# Patient Record
Sex: FEMALE | Race: WHITE | NOT HISPANIC OR LATINO | Employment: OTHER | URBAN - METROPOLITAN AREA
[De-identification: names, ages, dates, MRNs, and addresses within clinical notes are randomized per-mention and may not be internally consistent; named-entity substitution may affect disease eponyms.]

---

## 2017-02-02 ENCOUNTER — DOCUMENTATION ONLY (OUTPATIENT)
Dept: FAMILY MEDICINE | Facility: CLINIC | Age: 82
End: 2017-02-02

## 2017-02-02 ENCOUNTER — OFFICE VISIT (OUTPATIENT)
Dept: FAMILY MEDICINE | Facility: CLINIC | Age: 82
End: 2017-02-02
Payer: MEDICARE

## 2017-02-02 VITALS
HEART RATE: 68 BPM | SYSTOLIC BLOOD PRESSURE: 122 MMHG | TEMPERATURE: 98 F | RESPIRATION RATE: 16 BRPM | WEIGHT: 140.19 LBS | DIASTOLIC BLOOD PRESSURE: 68 MMHG | OXYGEN SATURATION: 93 % | BODY MASS INDEX: 27.52 KG/M2 | HEIGHT: 60 IN

## 2017-02-02 DIAGNOSIS — Z78.0 POST-MENOPAUSAL: ICD-10-CM

## 2017-02-02 DIAGNOSIS — Z23 FLU VACCINE NEED: Primary | ICD-10-CM

## 2017-02-02 DIAGNOSIS — M25.512 CHRONIC PAIN OF BOTH SHOULDERS: ICD-10-CM

## 2017-02-02 DIAGNOSIS — I10 ESSENTIAL HYPERTENSION: ICD-10-CM

## 2017-02-02 DIAGNOSIS — G89.29 CHRONIC PAIN OF BOTH SHOULDERS: ICD-10-CM

## 2017-02-02 DIAGNOSIS — M54.2 CHRONIC NECK PAIN: ICD-10-CM

## 2017-02-02 DIAGNOSIS — R60.0 BILATERAL EDEMA OF LOWER EXTREMITY: ICD-10-CM

## 2017-02-02 DIAGNOSIS — G89.29 CHRONIC NECK PAIN: ICD-10-CM

## 2017-02-02 DIAGNOSIS — M25.511 CHRONIC PAIN OF BOTH SHOULDERS: ICD-10-CM

## 2017-02-02 PROCEDURE — 1126F AMNT PAIN NOTED NONE PRSNT: CPT | Mod: S$GLB,,, | Performed by: NURSE PRACTITIONER

## 2017-02-02 PROCEDURE — 3078F DIAST BP <80 MM HG: CPT | Mod: S$GLB,,, | Performed by: NURSE PRACTITIONER

## 2017-02-02 PROCEDURE — 90662 IIV NO PRSV INCREASED AG IM: CPT | Mod: S$GLB,,, | Performed by: NURSE PRACTITIONER

## 2017-02-02 PROCEDURE — 1160F RVW MEDS BY RX/DR IN RCRD: CPT | Mod: S$GLB,,, | Performed by: NURSE PRACTITIONER

## 2017-02-02 PROCEDURE — 1159F MED LIST DOCD IN RCRD: CPT | Mod: S$GLB,,, | Performed by: NURSE PRACTITIONER

## 2017-02-02 PROCEDURE — 3074F SYST BP LT 130 MM HG: CPT | Mod: S$GLB,,, | Performed by: NURSE PRACTITIONER

## 2017-02-02 PROCEDURE — 1157F ADVNC CARE PLAN IN RCRD: CPT | Mod: S$GLB,,, | Performed by: NURSE PRACTITIONER

## 2017-02-02 PROCEDURE — 99213 OFFICE O/P EST LOW 20 MIN: CPT | Mod: 25,S$GLB,, | Performed by: NURSE PRACTITIONER

## 2017-02-02 PROCEDURE — G0008 ADMIN INFLUENZA VIRUS VAC: HCPCS | Mod: S$GLB,,, | Performed by: NURSE PRACTITIONER

## 2017-02-02 NOTE — PROGRESS NOTES
Subjective:       Patient ID: Jennifer Diaz is a 84 y.o. female.    Chief Complaint: Extremity Weakness  Has joint and back pain, shoulders and neck and now feels weak as well. Denies any fevers. Denies any falls, was ordered a bone density test, but never got it. Still has not gotten her labs done.     Has leg edema and pain in feet with curling up of her toes.   HPI  Review of Systems   Musculoskeletal: Positive for arthralgias, back pain and neck pain.   Neurological: Positive for weakness.       Objective:      Physical Exam   Constitutional: She is oriented to person, place, and time. She appears well-developed and well-nourished. No distress.   HENT:   Head: Normocephalic and atraumatic.   Eyes: Conjunctivae are normal. Right eye exhibits no discharge. Left eye exhibits no discharge. No scleral icterus.   Cardiovascular: Normal rate, regular rhythm and normal heart sounds.  Exam reveals no gallop and no friction rub.    No murmur heard.  Pulmonary/Chest: Effort normal and breath sounds normal. No respiratory distress. She has no wheezes. She has no rales.   Musculoskeletal:   Has limited ROM of extremities.    Neurological: She is alert and oriented to person, place, and time.   Skin: Skin is warm and dry. She is not diaphoretic.   Psychiatric: She has a normal mood and affect. Her behavior is normal.   Nursing note and vitals reviewed.      Assessment:     This office visit cannot be billed incident to as it does not meet the needed criteria.     1. Flu vaccine need    2. Essential hypertension    3. Post-menopausal    4. Bilateral edema of lower extremity    5. Chronic pain of both shoulders    6. Chronic neck pain        Plan:       Flu vaccine need  -     Influenza - High Dose (65+) (PF) (IM)    Essential hypertension  -     CBC auto differential; Future; Expected date: 2/2/17  -     Comprehensive metabolic panel; Future; Expected date: 2/2/17  -     Lipid panel; Future; Expected date: 2/2/17  -      TSH; Future; Expected date: 2/2/17    Post-menopausal  -     DXA Bone Density Appendicular Skeleton; Future; Expected date: 2/2/17    Bilateral edema of lower extremity  -     Brain natriuretic peptide; Future; Expected date: 2/2/17    Chronic pain of both shoulders  -     Ambulatory Referral to Physical/Occupational Therapy    Chronic neck pain  -     Ambulatory Referral to Physical/Occupational Therapy         Get labs and dexa done and follow up in 1 month

## 2017-02-02 NOTE — MR AVS SNAPSHOT
[facility]  [address] [address] [address]  Phone: [phone]  Fax: [phone]                  Jennifer Diaz   2017 3:20 PM   Office Visit    Description:  Female : 1932   Provider:  ALF Dugan   Department:  [facility]           Reason for Visit     Extremity Weakness           Diagnoses this Visit        Comments    Flu vaccine need    -  Primary     Essential hypertension         Post-menopausal         Bilateral edema of lower extremity         Chronic pain of both shoulders         Chronic neck pain                To Do List           Future Appointments        Provider Department Dept Phone    2017 9:00 AM LAB, JENNIFER SAT [facility] - Lab [phone]    2017 9:20 AM SLIC DEXA1 [facility] [phone]      Goals (5 Years of Data)     None      Follow-Up and Disposition     Return in about 1 month (around 3/2/2017).    Follow-up and Disposition History      [facility]     [facility] -  Assistance  Registered nurses in the [facility] provide clinical advisement, health education, appointment booking, and other advisory services.  Call for this free service at [phone].             Medications           Message regarding Medications     Verify the changes and/or additions to your medication regime listed below are the same as discussed with your clinician today.  If any of these changes or additions are incorrect, please notify your healthcare provider.             Verify that the below list of medications is an accurate representation of the medications you are currently taking.  If none reported, the list may be blank. If incorrect, please contact your healthcare provider. Carry this list with you in case of emergency.           Current Medications     albuterol (PROAIR HFA) 90 mcg/actuation inhaler Inhale 2 puffs into the lungs every 6  (six) hours as needed.    albuterol-ipratropium 2.5mg-0.5mg/3mL (DUO-NEB) 0.5 mg-3 mg(2.5 mg base)/3 mL nebulizer solution Take 3 mLs by nebulization every 6 (six) hours as needed for Wheezing.    budesonide-formoterol 80-4.5 mcg (SYMBICORT) 80-4.5 mcg/actuation HFAA Inhale 2 puffs into the lungs once daily.    clonazePAM (KLONOPIN) 0.5 MG tablet 0.25 mg po bid    fluticasone (FLONASE) 50 mcg/actuation nasal spray 2 sprays by Each Nare route once daily.    ibuprofen (ADVIL,MOTRIN) 100 mg/5 mL suspension Take by mouth every 6 (six) hours as needed for Temperature greater than.    L. ACIDOPHILUS/BIFID. ANIMALIS (ONE-A-DAY TRUBIOTICS ORAL) Take 1 tablet by mouth once daily.    OMEPRAZOLE ORAL Take by mouth daily as needed.    polyethylene glycol (GLYCOLAX) 17 gram/dose powder Take 17 g by mouth 2 (two) times daily as needed (Constipation).    propranolol (INDERAL) 20 MG tablet Take 1 tablet (20 mg total) by mouth 2 (two) times daily. For tremors           Clinical Reference Information           Your Vitals Were     BP Pulse Temp Resp Height Weight    122/68 (BP Location: Right arm, Patient Position: Sitting, BP Method: Automatic) 68 98 °F (36.7 °C) (Oral) 16 5' (1.524 m) 63.6 kg (140 lb 3.4 oz)    SpO2 BMI             93% 27.38 kg/m2         Blood Pressure          Most Recent Value    BP  122/68      Allergies as of 2/2/2017     Epinephrine    Pcn [Penicillins]      Immunizations Administered on Date of Encounter - 2/2/2017     Name Date Dose VIS Date Route    Influenza - High Dose 2/2/2017 0.5 mL 8/7/2015 Intramuscular      Orders Placed During Today's Visit      Normal Orders This Visit    Ambulatory Referral to Physical/Occupational Therapy     Influenza - High Dose (65+) (PF) (IM)     Future Labs/Procedures Expected by Expires    Brain natriuretic peptide  2/2/2017 5/3/2017    CBC auto differential  2/2/2017 2/2/2018    Comprehensive metabolic panel  2/2/2017 2/2/2018    DXA Bone Density Appendicular Skeleton   2/2/2017 2/2/2018    Lipid panel  2/2/2017 2/2/2018    TSH  2/2/2017 2/2/2018      Instructions    Get your labs done,        Language Assistance Services     ATTENTION: Language assistance services are available, free of charge. Please call [phone].      ATENCIÓN: Si habla sourav, tiene a barrientos disposición servicios gratuitos de asistencia lingüística. Llame al [phone].     CHÚ Ý: N?u b?n nói Ti?ng Vi?t, có các d?ch v? h? tr? ngôn ng? mi?n phí dành cho b?n. G?i s? [phone].         [facility] complies with applicable Federal civil rights laws and does not discriminate on the basis of race, color, national origin, age, disability, or sex.

## 2017-02-02 NOTE — PROGRESS NOTES
Health Maintenance Due   Topic Date Due    Lipid Panel  09/07/1932    TETANUS VACCINE  09/07/1950    DEXA SCAN  09/07/1972    Zoster Vaccine  09/07/1992    Influenza Vaccine  08/01/2016

## 2017-02-06 ENCOUNTER — TELEPHONE (OUTPATIENT)
Dept: FAMILY MEDICINE | Facility: CLINIC | Age: 82
End: 2017-02-06

## 2017-02-06 ENCOUNTER — OFFICE VISIT (OUTPATIENT)
Dept: FAMILY MEDICINE | Facility: CLINIC | Age: 82
End: 2017-02-06
Payer: MEDICARE

## 2017-02-06 ENCOUNTER — HOSPITAL ENCOUNTER (OUTPATIENT)
Facility: HOSPITAL | Age: 82
LOS: 1 days | Discharge: HOME HEALTH | End: 2017-02-08
Attending: EMERGENCY MEDICINE | Admitting: HOSPITALIST
Payer: MEDICARE

## 2017-02-06 ENCOUNTER — OFFICE VISIT (OUTPATIENT)
Dept: PODIATRY | Facility: CLINIC | Age: 82
End: 2017-02-06
Payer: MEDICARE

## 2017-02-06 ENCOUNTER — HOSPITAL ENCOUNTER (OUTPATIENT)
Dept: RADIOLOGY | Facility: CLINIC | Age: 82
Discharge: HOME | End: 2017-02-06
Attending: PODIATRIST
Payer: MEDICARE

## 2017-02-06 ENCOUNTER — NURSE TRIAGE (OUTPATIENT)
Dept: ADMINISTRATIVE | Facility: CLINIC | Age: 82
End: 2017-02-06

## 2017-02-06 VITALS
DIASTOLIC BLOOD PRESSURE: 64 MMHG | OXYGEN SATURATION: 95 % | TEMPERATURE: 98 F | HEART RATE: 66 BPM | HEIGHT: 60 IN | RESPIRATION RATE: 16 BRPM | BODY MASS INDEX: 27.38 KG/M2 | SYSTOLIC BLOOD PRESSURE: 114 MMHG

## 2017-02-06 VITALS — HEIGHT: 60 IN | BODY MASS INDEX: 27.52 KG/M2 | WEIGHT: 140.19 LBS

## 2017-02-06 DIAGNOSIS — R53.1 WEAKNESS: ICD-10-CM

## 2017-02-06 DIAGNOSIS — M79.671 FOOT PAIN, RIGHT: ICD-10-CM

## 2017-02-06 DIAGNOSIS — M20.10 HALLUX ABDUCTO VALGUS, UNSPECIFIED LATERALITY: ICD-10-CM

## 2017-02-06 DIAGNOSIS — S90.31XA CONTUSION OF FOOT, RIGHT: ICD-10-CM

## 2017-02-06 DIAGNOSIS — I63.9 CEREBROVASCULAR ACCIDENT (CVA), UNSPECIFIED MECHANISM: Primary | ICD-10-CM

## 2017-02-06 DIAGNOSIS — R60.0 BILATERAL EDEMA OF LOWER EXTREMITY: ICD-10-CM

## 2017-02-06 DIAGNOSIS — C44.709: ICD-10-CM

## 2017-02-06 DIAGNOSIS — S90.31XA CONTUSION OF FOOT, RIGHT: Primary | ICD-10-CM

## 2017-02-06 DIAGNOSIS — M20.60 TOE DEFORMITY, UNSPECIFIED LATERALITY: ICD-10-CM

## 2017-02-06 DIAGNOSIS — R29.898 RIGHT LEG WEAKNESS: Primary | ICD-10-CM

## 2017-02-06 DIAGNOSIS — I50.9 HEART FAILURE: ICD-10-CM

## 2017-02-06 LAB
ALBUMIN SERPL BCP-MCNC: 3.5 G/DL
ALP SERPL-CCNC: 65 U/L
ALT SERPL W/O P-5'-P-CCNC: 20 U/L
ANION GAP SERPL CALC-SCNC: 8 MMOL/L
AST SERPL-CCNC: 38 U/L
BASOPHILS # BLD AUTO: 0 K/UL
BASOPHILS NFR BLD: 0.4 %
BILIRUB SERPL-MCNC: 0.5 MG/DL
BNP SERPL-MCNC: 80 PG/ML
BUN SERPL-MCNC: 16 MG/DL
CALCIUM SERPL-MCNC: 9.8 MG/DL
CHLORIDE SERPL-SCNC: 95 MMOL/L
CO2 SERPL-SCNC: 32 MMOL/L
CREAT SERPL-MCNC: 0.6 MG/DL
DIFFERENTIAL METHOD: ABNORMAL
EOSINOPHIL # BLD AUTO: 0.1 K/UL
EOSINOPHIL NFR BLD: 1.9 %
ERYTHROCYTE [DISTWIDTH] IN BLOOD BY AUTOMATED COUNT: 14.7 %
EST. GFR  (AFRICAN AMERICAN): >60 ML/MIN/1.73 M^2
EST. GFR  (NON AFRICAN AMERICAN): >60 ML/MIN/1.73 M^2
GLUCOSE SERPL-MCNC: 121 MG/DL
HCT VFR BLD AUTO: 40.8 %
HGB BLD-MCNC: 12.9 G/DL
INR PPP: 1.1
LYMPHOCYTES # BLD AUTO: 0.7 K/UL
LYMPHOCYTES NFR BLD: 12.2 %
MCH RBC QN AUTO: 26.9 PG
MCHC RBC AUTO-ENTMCNC: 31.6 %
MCV RBC AUTO: 85 FL
MONOCYTES # BLD AUTO: 0.6 K/UL
MONOCYTES NFR BLD: 10.4 %
NEUTROPHILS # BLD AUTO: 4.3 K/UL
NEUTROPHILS NFR BLD: 75.1 %
PLATELET # BLD AUTO: 227 K/UL
PMV BLD AUTO: 7.8 FL
POTASSIUM SERPL-SCNC: 4.4 MMOL/L
PROT SERPL-MCNC: 7 G/DL
PROTHROMBIN TIME: 11.3 SEC
RBC # BLD AUTO: 4.78 M/UL
SODIUM SERPL-SCNC: 135 MMOL/L
TROPONIN I SERPL DL<=0.01 NG/ML-MCNC: 0.01 NG/ML
WBC # BLD AUTO: 5.8 K/UL

## 2017-02-06 PROCEDURE — 85025 COMPLETE CBC W/AUTO DIFF WBC: CPT

## 2017-02-06 PROCEDURE — 1125F AMNT PAIN NOTED PAIN PRSNT: CPT | Mod: S$GLB,,, | Performed by: PODIATRIST

## 2017-02-06 PROCEDURE — 99214 OFFICE O/P EST MOD 30 MIN: CPT | Mod: S$GLB,,, | Performed by: PODIATRIST

## 2017-02-06 PROCEDURE — G0378 HOSPITAL OBSERVATION PER HR: HCPCS

## 2017-02-06 PROCEDURE — 3074F SYST BP LT 130 MM HG: CPT | Mod: S$GLB,,, | Performed by: INTERNAL MEDICINE

## 2017-02-06 PROCEDURE — 99285 EMERGENCY DEPT VISIT HI MDM: CPT

## 2017-02-06 PROCEDURE — 80053 COMPREHEN METABOLIC PANEL: CPT

## 2017-02-06 PROCEDURE — 1159F MED LIST DOCD IN RCRD: CPT | Mod: S$GLB,,, | Performed by: INTERNAL MEDICINE

## 2017-02-06 PROCEDURE — 1157F ADVNC CARE PLAN IN RCRD: CPT | Mod: S$GLB,,, | Performed by: INTERNAL MEDICINE

## 2017-02-06 PROCEDURE — 93005 ELECTROCARDIOGRAM TRACING: CPT

## 2017-02-06 PROCEDURE — 1125F AMNT PAIN NOTED PAIN PRSNT: CPT | Mod: S$GLB,,, | Performed by: INTERNAL MEDICINE

## 2017-02-06 PROCEDURE — 1160F RVW MEDS BY RX/DR IN RCRD: CPT | Mod: S$GLB,,, | Performed by: INTERNAL MEDICINE

## 2017-02-06 PROCEDURE — 1159F MED LIST DOCD IN RCRD: CPT | Mod: S$GLB,,, | Performed by: PODIATRIST

## 2017-02-06 PROCEDURE — 12000002 HC ACUTE/MED SURGE SEMI-PRIVATE ROOM

## 2017-02-06 PROCEDURE — 84484 ASSAY OF TROPONIN QUANT: CPT

## 2017-02-06 PROCEDURE — 3078F DIAST BP <80 MM HG: CPT | Mod: S$GLB,,, | Performed by: INTERNAL MEDICINE

## 2017-02-06 PROCEDURE — 83880 ASSAY OF NATRIURETIC PEPTIDE: CPT

## 2017-02-06 PROCEDURE — 73630 X-RAY EXAM OF FOOT: CPT | Mod: 26,RT,S$GLB, | Performed by: RADIOLOGY

## 2017-02-06 PROCEDURE — 73610 X-RAY EXAM OF ANKLE: CPT | Mod: 26,RT,S$GLB, | Performed by: RADIOLOGY

## 2017-02-06 PROCEDURE — 36415 COLL VENOUS BLD VENIPUNCTURE: CPT

## 2017-02-06 PROCEDURE — 99214 OFFICE O/P EST MOD 30 MIN: CPT | Mod: S$GLB,,, | Performed by: INTERNAL MEDICINE

## 2017-02-06 PROCEDURE — 1157F ADVNC CARE PLAN IN RCRD: CPT | Mod: S$GLB,,, | Performed by: PODIATRIST

## 2017-02-06 PROCEDURE — 73610 X-RAY EXAM OF ANKLE: CPT | Mod: TC,PO,RT

## 2017-02-06 PROCEDURE — 85610 PROTHROMBIN TIME: CPT

## 2017-02-06 PROCEDURE — 99999 PR PBB SHADOW E&M-EST. PATIENT-LVL III: CPT | Mod: PBBFAC,,, | Performed by: PODIATRIST

## 2017-02-06 PROCEDURE — 1160F RVW MEDS BY RX/DR IN RCRD: CPT | Mod: S$GLB,,, | Performed by: PODIATRIST

## 2017-02-06 RX ORDER — ASPIRIN 81 MG/1
81 TABLET ORAL DAILY
Status: ON HOLD | COMMUNITY
End: 2017-02-07

## 2017-02-06 RX ORDER — POTASSIUM CHLORIDE 750 MG/1
10 TABLET, EXTENDED RELEASE ORAL DAILY
Qty: 1 TABLET | Refills: 30 | Status: ON HOLD | OUTPATIENT
Start: 2017-02-06 | End: 2017-02-07 | Stop reason: SDUPTHER

## 2017-02-06 RX ORDER — FUROSEMIDE 20 MG/1
20 TABLET ORAL 2 TIMES DAILY
Qty: 60 TABLET | Refills: 11 | Status: ON HOLD | OUTPATIENT
Start: 2017-02-06 | End: 2017-03-01

## 2017-02-06 RX ORDER — NAPROXEN SODIUM 220 MG/1
81 TABLET, FILM COATED ORAL DAILY
Status: ON HOLD | COMMUNITY
End: 2017-02-07

## 2017-02-06 NOTE — MR AVS SNAPSHOT
[facility]  [address] [address] [address]  Phone: [phone]  Fax: [phone]                  Jennifer Diaz   2017 3:20 PM   Office Visit    Description:  Female : 1932   Provider:  Catrachito Mart MD   Department:  [facility]           Reason for Visit     Fall     Extremity Weakness     Foot Swelling     Foot Pain           Diagnoses this Visit        Comments    Cerebrovascular accident (CVA), unspecified mechanism    -  Primary     Primary cancer of skin of left foot         Bilateral edema of lower extremity                To Do List           Future Appointments        Provider Department Dept Phone    2017 9:00 AM LAB, SLIDELL SAT [facility] - Lab [phone]    2017 9:20 AM SLIC DEXA1 [facility] [phone]    2/15/2017 4:00 PM Marielos Cintron, PT [facility] [phone]    3/6/2017 1:00 PM Vijay Chris, [facility] [phone]    3/6/2017 2:40 PM ALF Dugan [facility] [phone]      Goals (5 Years of Data)     None      Follow-Up and Disposition     Return in about 2 weeks (around 2017).    Follow-up and Disposition History       These Medications        Disp Refills Start End    furosemide (LASIX) 20 MG tablet 60 tablet 11 2017    Take 1 tablet (20 mg total) by mouth 2 (two) times daily. - Oral    Pharmacy: [facility] [address] [place] [address] [address] [facility] Ph #: [phone]       potassium chloride (KLOR-CON) 10 MEQ TbSR 1 tablet 30 2017     Take 1 tablet (10 mEq total) by mouth once daily. - Oral    Pharmacy: [facility] [id number] [facility] [place] - [address] AT [facility] Ph #: [phone]         [facility]     [facility] - 24/7 Assistance  Registered nurses in the [facility] provide  clinical advisement, health education, appointment booking, and other advisory services.  Call for this free service at [phone].             Medications           Message regarding Medications     Verify the changes and/or additions to your medication regime listed below are the same as discussed with your clinician today.  If any of these changes or additions are incorrect, please notify your healthcare provider.        START taking these NEW medications        Refills    furosemide (LASIX) 20 MG tablet 11    Sig: Take 1 tablet (20 mg total) by mouth 2 (two) times daily.    Class: Normal    Route: Oral    potassium chloride (KLOR-CON) 10 MEQ TbSR 30    Sig: Take 1 tablet (10 mEq total) by mouth once daily.    Class: Normal    Route: Oral      STOP taking these medications     OMEPRAZOLE ORAL Take by mouth daily as needed.    ibuprofen (ADVIL,MOTRIN) 100 mg/5 mL suspension Take by mouth every 6 (six) hours as needed for Temperature greater than.    L. ACIDOPHILUS/BIFID. ANIMALIS (ONE-A-DAY TRUBIOTICS ORAL) Take 1 tablet by mouth once daily.           Verify that the below list of medications is an accurate representation of the medications you are currently taking.  If none reported, the list may be blank. If incorrect, please contact your healthcare provider. Carry this list with you in case of emergency.           Current Medications     albuterol (PROAIR HFA) 90 mcg/actuation inhaler Inhale 2 puffs into the lungs every 6 (six) hours as needed.    albuterol-ipratropium 2.5mg-0.5mg/3mL (DUO-NEB) 0.5 mg-3 mg(2.5 mg base)/3 mL nebulizer solution Take 3 mLs by nebulization every 6 (six) hours as needed for Wheezing.    aspirin (ECOTRIN) 81 MG EC tablet Take 81 mg by mouth once daily.    aspirin 81 MG Chew Take 81 mg by mouth once daily.    budesonide-formoterol 80-4.5 mcg (SYMBICORT) 80-4.5 mcg/actuation HFAA Inhale 2 puffs into the lungs once daily.    clonazePAM (KLONOPIN) 0.5 MG tablet 0.25 mg po bid     fluticasone (FLONASE) 50 mcg/actuation nasal spray 2 sprays by Each Nare route once daily.    furosemide (LASIX) 20 MG tablet Take 1 tablet (20 mg total) by mouth 2 (two) times daily.    polyethylene glycol (GLYCOLAX) 17 gram/dose powder Take 17 g by mouth 2 (two) times daily as needed (Constipation).    potassium chloride (KLOR-CON) 10 MEQ TbSR Take 1 tablet (10 mEq total) by mouth once daily.    propranolol (INDERAL) 20 MG tablet Take 1 tablet (20 mg total) by mouth 2 (two) times daily. For tremors           Clinical Reference Information           Your Vitals Were     BP Pulse Temp Resp Height SpO2    114/64 (BP Location: Right arm, Patient Position: Sitting, BP Method: Automatic) 66 97.9 °F (36.6 °C) (Oral) 16 5' (1.524 m) 95%    BMI                27.38 kg/m2          Blood Pressure          Most Recent Value    BP  114/64      Allergies as of 2/6/2017     Epinephrine    Pcn [Penicillins]      Immunizations Administered on Date of Encounter - 2/6/2017     None      Orders Placed During Today's Visit      Normal Orders This Visit    Ambulatory Referral to Dermatology     Future Labs/Procedures Expected by Expires    Brain natriuretic peptide  2/6/2017 4/7/2018    CBC auto differential  2/6/2017 2/7/2018    Comprehensive metabolic panel  2/6/2017 2/7/2018    D dimer, quantitative  2/6/2017 4/7/2018      Instructions    Go to the [facility] now.    Low-Salt Diet  This diet removes foods that are high in salt. It also limits the amount of salt you use when cooking. It is most often used for people with high blood pressure, edema (fluid retention), and kidney, liver, or heart disease.  Table salt contains the mineral sodium. Your body needs sodium to work normally. But too much sodium can make your health problems worse. Your healthcare provider is recommending a low-salt (also called low-sodium) diet for you. Your total daily allowance of salt is 1,500 to 2,300 milligrams (mg). It is less than 1 teaspoon of table  salt. This means you can have only about 500 to 700 mg of sodium at each meal. People with certain health problems should limit salt intake to the lower end of the recommended range.    When you cook, dont add much salt. If you can cook without using salt, even better. Dont add salt to your food at the table.  When shopping, read food labels. Salt is often called sodium on the label. Choose foods that are salt-free, low salt, or very low salt. Note that foods with reduced salt may not lower your salt intake enough.    Beans, potatoes, and pasta  Ok: Dry beans, split peas, lentils, potatoes, rice, macaroni, pasta, spaghetti without added salt  Avoid: Potato chips, tortilla chips, and similar products  Breads and cereals  Ok: Low-sodium breads, rolls, cereals, and cakes; low-salt crackers, matzo crackers  Avoid: Salted crackers, pretzels, popcorn, Beninese toast, pancakes, muffins  Dairy  Ok: Milk, chocolate milk, hot chocolate mix, low-salt cheeses, and yogurt  Avoid: Processed cheese and cheese spreads; Roquefort, Camembert, and cottage cheese; buttermilk, instant breakfast drink  Desserts  Ok: Ice cream, frozen yogurt, juice bars, gelatin, cookies and pies, sugar, honey, jelly, hard candy  Avoid: Most pies, cakes and cookies prepared or processed with salt; instant pudding  Drinks  Ok: Tea, coffee, fizzy (carbonated) drinks, juices  Avoid: Flavored coffees, electrolyte replacement drinks, sports drinks  Meats  Ok: All fresh meat, fish, poultry, low-salt tuna, eggs, egg substitute  Avoid: Smoked, pickled, brine-cured, or salted meats and fish. This includes madrid, chipped beef, corned beef, hot dogs, deli meats, ham, kosher meats, salt pork, sausage, canned tuna, salted codfish, smoked salmon, herring, sardines, or anchovies.  Seasonings and spices  Ok: Most seasonings are okay. Good substitutes for salt include: fresh herb blends, hot sauce, lemon, garlic, segovia, vinegar, dry mustard, parsley, cilantro,  horseradish, tomato paste, regular margarine, mayonnaise, unsalted butter, cream cheese, vegetable oil, cream, low-salt salad dressing and gravy.  Avoid: Regular ketchup, relishes, pickles, soy sauce, teriyaki sauce, Worcestershire sauce, BBQ sauce, tartar sauce, meat tenderizer, chili sauce, regular gravy, regular salad dressing, salted butter  Soups  Ok: Low-salt soups and broths made with allowed foods  Avoid: Bouillon cubes, soups with smoked or salted meats, regular soup and broth  Vegetables  Ok: Most vegetables are okay; also low-salt tomato and vegetable juices  Avoid: Sauerkraut and other brine-soaked vegetables; pickles and other pickled vegetables; tomato juice, olives  © 2508-0937 [employer]. [address] [place] [address]. All rights reserved. This information is not intended as a substitute for professional medical care. Always follow your healthcare professional's instructions.       Language Assistance Services     ATTENTION: Language assistance services are available, free of charge. Please call [phone].      ATENCIÓN: Si habla sourav, tiene a barrientos disposición servicios gratuitos de asistencia lingüística. Llame al [phone].     CHÚ Ý: N?u b?n nói Ti?ng Vi?t, có các d?ch v? h? tr? ngôn ng? mi?n phí dành cho b?n. G?i s? [phone].         [facility] complies with applicable Federal civil rights laws and does not discriminate on the basis of race, color, national origin, age, disability, or sex.

## 2017-02-06 NOTE — MR AVS SNAPSHOT
[facility]  [address]  [place] [address]  Phone: [phone]                  Jennifer Diaz   2017 10:15 AM   Office Visit    Description:  Female : 1932   Provider:  Vijay Chris DPM   Department:  [facility]           Reason for Visit     Foot Pain           Diagnoses this Visit        Comments    Contusion of foot, right    -  Primary     Foot pain, right         Hallux abducto valgus, unspecified laterality         Toe deformity, unspecified laterality                To Do List           Future Appointments        Provider Department Dept Phone    2017 3:20 PM Catrachito Mart MD [facility] [phone]    2017 9:00 AM LAB, BENITEZMICHAEL SAT [facility] - Lab [phone]    2017 9:20 AM SLIC DEXA1 [facility] [phone]    2/15/2017 4:00 PM Marielos Cintron PT [facility] [phone]    3/6/2017 1:00 PM Vijay Chris DPM [facility] [phone]      Goals (5 Years of Data)     None      Follow-Up and Disposition     Return in about 1 month (around 3/6/2017) for contusion right foot.    Follow-up and Disposition History      [facility]     [facility] - 24/7 Assistance  Registered nurses in the [facility] provide clinical advisement, health education, appointment booking, and other advisory services.  Call for this free service at [phone].             Medications           Message regarding Medications     Verify the changes and/or additions to your medication regime listed below are the same as discussed with your clinician today.  If any of these changes or additions are incorrect, please notify your healthcare provider.             Verify that the below list of medications is an accurate representation of the medications you are currently taking.  If none reported, the list may be blank. If incorrect, please contact your healthcare  provider. Carry this list with you in case of emergency.           Current Medications     albuterol (PROAIR HFA) 90 mcg/actuation inhaler Inhale 2 puffs into the lungs every 6 (six) hours as needed.    albuterol-ipratropium 2.5mg-0.5mg/3mL (DUO-NEB) 0.5 mg-3 mg(2.5 mg base)/3 mL nebulizer solution Take 3 mLs by nebulization every 6 (six) hours as needed for Wheezing.    budesonide-formoterol 80-4.5 mcg (SYMBICORT) 80-4.5 mcg/actuation HFAA Inhale 2 puffs into the lungs once daily.    clonazePAM (KLONOPIN) 0.5 MG tablet 0.25 mg po bid    fluticasone (FLONASE) 50 mcg/actuation nasal spray 2 sprays by Each Nare route once daily.    ibuprofen (ADVIL,MOTRIN) 100 mg/5 mL suspension Take by mouth every 6 (six) hours as needed for Temperature greater than.    L. ACIDOPHILUS/BIFID. ANIMALIS (ONE-A-DAY TRUBIOTICS ORAL) Take 1 tablet by mouth once daily.    OMEPRAZOLE ORAL Take by mouth daily as needed.    polyethylene glycol (GLYCOLAX) 17 gram/dose powder Take 17 g by mouth 2 (two) times daily as needed (Constipation).    propranolol (INDERAL) 20 MG tablet Take 1 tablet (20 mg total) by mouth 2 (two) times daily. For tremors           Clinical Reference Information           Your Vitals Were     Height Weight BMI          5' (1.524 m) 63.6 kg (140 lb 3.4 oz) 27.38 kg/m2        Allergies as of 2/6/2017     Epinephrine    Pcn [Penicillins]      Immunizations Administered on Date of Encounter - 2/6/2017     None      Orders Placed During Today's Visit     Future Labs/Procedures Expected by Expires    X-Ray Ankle Complete Right  2/6/2017 2/6/2018    X-Ray Foot Complete Right  2/6/2017 2/6/2018      Language Assistance Services     ATTENTION: Language assistance services are available, free of charge. Please call [phone].      ATENCIÓN: Si habla salimaañol, tiene a barrientos disposición servicios gratuitos de asistencia lingüística. Llame al [phone].     CELESTINE Ý: N?u b?n nói Ti?ng Vi?t, có các d?ch v? h? tr? ngôn ng? mi?n phí  dành cho b?n. G?i s? [phone].         [facility] complies with applicable Federal civil rights laws and does not discriminate on the basis of race, color, national origin, age, disability, or sex.

## 2017-02-06 NOTE — PROGRESS NOTES
Subjective:      Patient ID: eJnnifer Diaz is a 84 y.o. female.    Chief Complaint: Foot Pain (right foot, fell on 02/03/2017, 2nd fall occured on 02/04/2017)    Sharp pain and bruising right forefoot.  Sudden onset Friday 2/3/17 during a fall.  No change since, aggravated by increased weight bearing, shoe gear, pressure.  No previous medical treatment.  OTC pain med not helping.      Review of Systems   Constitution: Negative for chills, diaphoresis, fever, malaise/fatigue and night sweats.   Cardiovascular: Positive for leg swelling. Negative for claudication, cyanosis and syncope.   Skin: Negative for color change, dry skin, rash, suspicious lesions and unusual hair distribution.   Musculoskeletal: Positive for joint pain. Negative for falls, joint swelling, muscle cramps, muscle weakness and stiffness.   Gastrointestinal: Negative for constipation, diarrhea, nausea and vomiting.   Neurological: Negative for brief paralysis, disturbances in coordination, focal weakness, numbness, paresthesias, sensory change and tremors.           Objective:      Physical Exam   Constitutional: She appears well-developed and well-nourished. She is cooperative. No distress.   Cardiovascular:   Pulses:       Popliteal pulses are 2+ on the right side, and 2+ on the left side.        Dorsalis pedis pulses are 1+ on the right side, and 1+ on the left side.        Posterior tibial pulses are 1+ on the right side, and 1+ on the left side.   Capillary refill 3 seconds all toes/distal feet, all toes/both feet warm to touch.      Negative lymphadenopathy bilateral popliteal fossa and tarsal tunnel.     2+ pitting lower extremity edema bilateral.     Musculoskeletal:        Right ankle: Normal. She exhibits normal range of motion, no swelling, no ecchymosis, no deformity, no laceration and normal pulse. Achilles tendon normal. Achilles tendon exhibits no pain, no defect and normal Kelley's test results.   Sharp deep focal pain  to palpation inferior right mtpj 2-5 with focal bruising to the area without loss of function.    Visible and palpable bunion without pain at dorsomedial 1st metatarsal head left and right.  Hallux abducted left and right partially reducible, tracks laterally without being track bound.  No ecchymosis, erythema, edema, or cardinal signs infection or signs of trauma same foot.    Patient has crossover toe 2 bilateral and adduction deformity of digits    3-5 bilateral               partially reducible without symptom today.    Otherwise, Normal angle, base, station of gait. All ten toes without clubbing, cyanosis, or signs of ischemia.   Range of motion, stability, muscle strength, and muscle tone normal bilateral feet and legs.     Lymphadenopathy: No inguinal adenopathy noted on the right or left side.   Negative lymphadenopathy bilateral popliteal fossa and tarsal tunnel.   Neurological: She is alert. She has normal strength. She displays no atrophy and no tremor. No sensory deficit. She exhibits normal muscle tone. She displays no seizure activity. Gait normal.   Reflex Scores:       Patellar reflexes are 2+ on the right side and 2+ on the left side.       Achilles reflexes are 2+ on the right side and 2+ on the left side.  Negative tinel sign to percussion sural, superficial peroneal, deep peroneal, saphenous, and posterior tibial nerves right and left ankles and feet.     Skin: Skin is warm, dry and intact. No abrasion, no bruising, no burn, no ecchymosis, no laceration, no lesion and no rash noted. She is not diaphoretic. No cyanosis or erythema. No pallor. Nails show no clubbing.     Skin is normal age and health appropriate color, turgor, texture, and temperature bilateral lower extremities without ulceration, hyperpigmentation, discoloration, masses nodules or cords palpated.  No ecchymosis, erythema, edema, or cardinal signs of infection bilateral lower extremities.               Assessment:       Encounter  Diagnoses   Name Primary?    Contusion of foot, right Yes    Foot pain, right     Hallux abducto valgus, unspecified laterality     Toe deformity, unspecified laterality          Plan:       Jennifer was seen today for foot pain.    Diagnoses and all orders for this visit:    Contusion of foot, right  -     X-Ray Foot Complete Right; Future  -     X-Ray Ankle Complete Right; Future    Foot pain, right  -     X-Ray Foot Complete Right; Future  -     X-Ray Ankle Complete Right; Future    Hallux abducto valgus, unspecified laterality  -     X-Ray Foot Complete Right; Future  -     X-Ray Ankle Complete Right; Future    Toe deformity, unspecified laterality  -     X-Ray Foot Complete Right; Future  -     X-Ray Ankle Complete Right; Future      I counseled the patient on her conditions, their implications and medical management.        ACE in neutral position right foot.    X-rays right foot/ankle.              Return in about 1 month (around 3/6/2017) for contusion right foot.

## 2017-02-06 NOTE — PROGRESS NOTES
Subjective:       Patient ID: Jennifer Diaz is a 84 y.o. female.    Chief Complaint: Fall (2 in 12 hours,concerned patient had stroke); Extremity Weakness (increased extremely); Foot Swelling; and Foot Pain    HPI CHIEF COMPLAINT: falling.  HPI:     ONSET/TIMING: Onset     3 days  ago. Sudden .Work related: no . Trauma: no    FREQUENCY: Twice and 12 hours    DURATION:     QUALITY/COURSE:  .  unchanged    INTENSITY/SEVERITY:  Severity   6    (on a 1-10 scale). .    MODIFIERS/TREATMENTS: Taking medications:  .            over the counter medications :  .  . .   . . Prior x-rays: no . .      SYMPTOMS/RELATED:  . None . Possible medication side effects include .     The following symptoms are positive if BOLD, negative otherwise.       CONTEXT/WHEN:  .  . Similar problems . clinic visits . Daytime . Nocturnal . Lying . Sitting . Standing .  activity  . Injuries.   Vision_problems.    LOCATION:  .. Bilateral .  Right leg weakness. Left . Radiation .     REVIEW OF SYSTEMS :   Gait and balance problems  . lower limb arthritis  .  Sharp_pain . Dull_pain . Burning_pain .  neurologic problems  .  confusion  . dizziness on standing  . environmental hazards  . heart problems  .  Unchanged . As above .    Review of Systems    Objective:      Vitals:    02/06/17 1555   BP: 114/64   Pulse: 66   Resp: 16   Temp: 97.9 °F (36.6 °C)   TempSrc: Oral   SpO2: 95%   Height: 5' (1.524 m)   PainSc: 10-Worst pain ever   PainLoc: Foot     Physical Exam   Constitutional: She appears well-developed and well-nourished.   Eyes: Pupils are equal, round, and reactive to light.   Cardiovascular: Normal rate, regular rhythm and normal heart sounds.    Pulmonary/Chest: Effort normal and breath sounds normal.   Abdominal: Soft. There is no tenderness.   Musculoskeletal: She exhibits edema (3+ calf edema bilaterally).   Neurological: She is alert. She displays abnormal reflex (right knee reflux 3+ on the right and 1+ on the left).   Marked  right leg weakness   Skin:   1 inch diameter mass on the sole of her instep left foot, raised and hard   Psychiatric: She has a normal mood and affect. Her behavior is normal. Thought content normal.   Nursing note and vitals reviewed.        Assessment:       1. Cerebrovascular accident (CVA), unspecified mechanism    2. Primary cancer of skin of left foot    3. Bilateral edema of lower extremity          Plan:   The patient is to go to the [facility].  Discussed with Dr. Roberts  Cerebrovascular accident (CVA), unspecified mechanism    Primary cancer of skin of left foot  -     Ambulatory Referral to Dermatology    Bilateral edema of lower extremity  -     Brain natriuretic peptide; Future; Expected date: 2/6/17  -     D dimer, quantitative; Future; Expected date: 2/6/17  -     CBC auto differential; Future; Expected date: 2/6/17  -     Comprehensive metabolic panel; Future; Expected date: 2/6/17  -     furosemide (LASIX) 20 MG tablet; Take 1 tablet (20 mg total) by mouth 2 (two) times daily.  Dispense: 60 tablet; Refill: 11    Other orders  -     potassium chloride (KLOR-CON) 10 MEQ TbSR; Take 1 tablet (10 mEq total) by mouth once daily.  Dispense: 1 tablet; Refill: 30      Return in about 2 weeks (around 2/20/2017).

## 2017-02-06 NOTE — PATIENT INSTRUCTIONS
Go to the [facility] now.    Low-Salt Diet  This diet removes foods that are high in salt. It also limits the amount of salt you use when cooking. It is most often used for people with high blood pressure, edema (fluid retention), and kidney, liver, or heart disease.  Table salt contains the mineral sodium. Your body needs sodium to work normally. But too much sodium can make your health problems worse. Your healthcare provider is recommending a low-salt (also called low-sodium) diet for you. Your total daily allowance of salt is 1,500 to 2,300 milligrams (mg). It is less than 1 teaspoon of table salt. This means you can have only about 500 to 700 mg of sodium at each meal. People with certain health problems should limit salt intake to the lower end of the recommended range.    When you cook, dont add much salt. If you can cook without using salt, even better. Dont add salt to your food at the table.  When shopping, read food labels. Salt is often called sodium on the label. Choose foods that are salt-free, low salt, or very low salt. Note that foods with reduced salt may not lower your salt intake enough.    Beans, potatoes, and pasta  Ok: Dry beans, split peas, lentils, potatoes, rice, macaroni, pasta, spaghetti without added salt  Avoid: Potato chips, tortilla chips, and similar products  Breads and cereals  Ok: Low-sodium breads, rolls, cereals, and cakes; low-salt crackers, matzo crackers  Avoid: Salted crackers, pretzels, popcorn, Yoruba toast, pancakes, muffins  Dairy  Ok: Milk, chocolate milk, hot chocolate mix, low-salt cheeses, and yogurt  Avoid: Processed cheese and cheese spreads; Roquefort, Camembert, and cottage cheese; buttermilk, instant breakfast drink  Desserts  Ok: Ice cream, frozen yogurt, juice bars, gelatin, cookies and pies, sugar, honey, jelly, hard candy  Avoid: Most pies, cakes and cookies prepared or processed with salt; instant pudding  Drinks  Ok: Tea, coffee, fizzy (carbonated)  drinks, juices  Avoid: Flavored coffees, electrolyte replacement drinks, sports drinks  Meats  Ok: All fresh meat, fish, poultry, low-salt tuna, eggs, egg substitute  Avoid: Smoked, pickled, brine-cured, or salted meats and fish. This includes madrid, chipped beef, corned beef, hot dogs, deli meats, ham, kosher meats, salt pork, sausage, canned tuna, salted codfish, smoked salmon, herring, sardines, or anchovies.  Seasonings and spices  Ok: Most seasonings are okay. Good substitutes for salt include: fresh herb blends, hot sauce, lemon, garlic, segovia, vinegar, dry mustard, parsley, cilantro, horseradish, tomato paste, regular margarine, mayonnaise, unsalted butter, cream cheese, vegetable oil, cream, low-salt salad dressing and gravy.  Avoid: Regular ketchup, relishes, pickles, soy sauce, teriyaki sauce, Worcestershire sauce, BBQ sauce, tartar sauce, meat tenderizer, chili sauce, regular gravy, regular salad dressing, salted butter  Soups  Ok: Low-salt soups and broths made with allowed foods  Avoid: Bouillon cubes, soups with smoked or salted meats, regular soup and broth  Vegetables  Ok: Most vegetables are okay; also low-salt tomato and vegetable juices  Avoid: Sauerkraut and other brine-soaked vegetables; pickles and other pickled vegetables; tomato juice, olives  © 4637-5324 [employer]. [address] [place] [address]. All rights reserved. This information is not intended as a substitute for professional medical care. Always follow your healthcare professional's instructions.

## 2017-02-06 NOTE — IP AVS SNAPSHOT
[address] [place] [address]  Phone: [phone]           Patient Discharge Instructions     Our goal is to set you up for success. This packet includes information on your condition, medications, and your home care. It will help you to care for yourself so you don't get sicker and need to go back to the hospital.     Please ask your nurse if you have any questions.        There are many details to remember when preparing to leave the hospital. Here is what you will need to do:    1. Take your medicine. If you are prescribed medications, review your Medication List in the following pages. You may have new medications to  at the pharmacy and others that you'll need to stop taking. Review the instructions for how and when to take your medications. Talk with your doctor or nurses if you are unsure of what to do.     2. Go to your follow-up appointments. Specific follow-up information is listed in the following pages. Your may be contacted by a transition nurse or clinical provider about future appointments. Be sure we have all of the phone numbers to reach you, if needed. Please contact your provider's office if you are unable to make an appointment.     3. Watch for warning signs. Your doctor or nurse will give you detailed warning signs to watch for and when to call for assistance. These instructions may also include educational information about your condition. If you experience any of warning signs to your health, call your doctor.               [facility]  Unless otherwise directed by your provider, please contact [facility], our nurse care line that is available for 24/7 assistance.     [phone] (toll-free)    Registered nurses in the [facility] provide clinical advisement, health education, appointment booking, and other advisory services.                    ** Verify the list of medication(s) below is accurate and up to date.  Carry this with you in case of emergency. If your medications have changed, please notify your healthcare provider.             Medication List      START taking these medications        Additional Info                      atorvastatin 10 MG tablet   Commonly known as:  LIPITOR   Quantity:  30 tablet   Refills:  0   Dose:  10 mg    Instructions:  Take 1 tablet (10 mg total) by mouth every evening.     Begin Date    AM    Noon    PM    Bedtime       potassium chloride 20 mEq   Commonly known as:  K-TAB   Quantity:  30 tablet   Refills:  0   Dose:  20 mEq    Last time this was given:  10 mEq on 2/8/2017  9:24 AM   Instructions:  Take 1 tablet (20 mEq total) by mouth once daily.     Begin Date    AM    Noon    PM    Bedtime         CHANGE how you take these medications        Additional Info                      clonazePAM 0.5 MG tablet   Commonly known as:  KLONOPIN   Quantity:  180 tablet   Refills:  0   What changed:    - how much to take  - when to take this  - additional instructions    Last time this was given:  0.5 mg on 2/7/2017  9:55 PM   Instructions:  0.25 mg po bid     Begin Date    AM    Noon    PM    Bedtime         CONTINUE taking these medications        Additional Info                      albuterol 90 mcg/actuation inhaler   Commonly known as:  PROAIR HFA   Quantity:  18 g   Refills:  5   Dose:  2 puff   Comments:  Pt requesting 90 day supply.     Instructions:  Inhale 2 puffs into the lungs every 6 (six) hours as needed.     Begin Date    AM    Noon    PM    Bedtime       albuterol-ipratropium 2.5mg-0.5mg/3mL 0.5 mg-3 mg(2.5 mg base)/3 mL nebulizer solution   Commonly known as:  DUO-NEB   Quantity:  90 vial   Refills:  5   Dose:  3 mL    Instructions:  Take 3 mLs by nebulization every 6 (six) hours as needed for Wheezing.     Begin Date    AM    Noon    PM    Bedtime       aspirin 81 MG EC tablet   Commonly known as:  ECOTRIN   Refills:  0   Dose:  81 mg    Last time this was given:  81 mg on  2/8/2017  9:24 AM   Instructions:  Take 1 tablet (81 mg total) by mouth once daily.     Begin Date    AM    Noon    PM    Bedtime       budesonide-formoterol 80-4.5 mcg 80-4.5 mcg/actuation Hfaa   Commonly known as:  SYMBICORT   Quantity:  10.2 g   Refills:  11   Dose:  2 puff   Indications:  Intrinsic Asthma    Instructions:  Inhale 2 puffs into the lungs once daily.     Begin Date    AM    Noon    PM    Bedtime       fluticasone 50 mcg/actuation nasal spray   Commonly known as:  FLONASE   Quantity:  48 g   Refills:  3   Dose:  2 spray    Instructions:  2 sprays by Each Nare route once daily.     Begin Date    AM    Noon    PM    Bedtime       furosemide 20 MG tablet   Commonly known as:  LASIX   Quantity:  60 tablet   Refills:  11   Dose:  20 mg    Last time this was given:  20 mg on 2/8/2017  9:24 AM   Instructions:  Take 1 tablet (20 mg total) by mouth 2 (two) times daily.     Begin Date    AM    Noon    PM    Bedtime       polyethylene glycol 17 gram/dose powder   Commonly known as:  GLYCOLAX   Quantity:  289 g   Refills:  0   Dose:  17 g    Instructions:  Take 17 g by mouth 2 (two) times daily as needed (Constipation).     Begin Date    AM    Noon    PM    Bedtime       propranolol 20 MG tablet   Commonly known as:  INDERAL   Quantity:  180 tablet   Refills:  1   Dose:  20 mg    Last time this was given:  20 mg on 2/8/2017  9:24 AM   Instructions:  Take 1 tablet (20 mg total) by mouth 2 (two) times daily. For tremors     Begin Date    AM    Noon    PM    Bedtime       trospium 20 mg Tab tablet   Commonly known as:  sanctura   Refills:  0   Dose:  20 mg    Instructions:  Take 20 mg by mouth 2 (two) times daily.     Begin Date    AM    Noon    PM    Bedtime            Where to Get Your Medications      These medications were sent to [id number] [place] - [address] AT [facility]  [address] [address]     Phone:  [phone]     atorvastatin 10 MG  tablet    potassium chloride 20 mEq         You can get these medications from any pharmacy     You don't need a prescription for these medications     aspirin 81 MG EC tablet                  Please bring to all follow up appointments:    1. A copy of your discharge instructions.  2. All medicines you are currently taking in their original bottles.  3. Identification and insurance card.    Please arrive 15 minutes ahead of scheduled appointment time.    Please call 24 hours in advance if you must reschedule your appointment and/or time.        Your Scheduled Appointments     Feb 13, 2017  9:00 AM CST   Fasting Lab with [facility], [facility]   [facility] ([place]    [address]  [place] [address]   [phone]            Feb 13, 2017  9:20 AM CST   Bone Density with [facility] DEXA1   [facility] [place]    [address]  [place] [address]   [phone]            Feb 15, 2017  4:00 PM CST   New Physical Therapy Patient with Marielos Cintron, PT   [facility] [facility]    [address]  [place] [address]   [phone]            Mar 06, 2017  1:00 PM CST   Established Patient Visit with [facility] - Podiatry ([place])    [address]  [place] [address]   [phone]            Mar 06, 2017  2:40 PM CST   Established Patient Visit with [facility]   [address] [address] [phone]              Referrals     Future Orders    Ambulatory referral to Home Health         Discharge Instructions     Future Orders    Activity as tolerated     Diet general     Questions:    Total calories:      Fat restriction, if any:      Protein restriction, if any:      Na restriction, if any:      Fluid restriction:      Additional restrictions:          Discharge Instructions       Thank you for choosing [facility] for your medical  "care. The primary doctor who is taking care of you at the time of your discharge is Sylvia Bolanos MD.     You were admitted to the hospital with Right leg weakness.     Please note your discharge instructions, including diet/activity restrictions, follow-up appointments, and medication changes.  If you have any questions about your medical issues, prescriptions, or any other questions, please feel free to contact the [facility] Medicine Dept at [phone] and we will help.    If you are previously with Home health, outpatient PT/OT or under a therapy program, you are cleared to return to those programs.    Please direct all long term medication refills and follow up to your primary care provider, Catrachito Mart MD. Thank you again for letting us take care of your health care needs.    Please note the following discharge instructions per your discharging physician-  Take all meds as prescribed. Follow up with podiatry and dermatology.       Primary Diagnosis     Your primary diagnosis was:  Weakness Of Right Lower Extremity      Admission Information     Date & Time Provider Department CSN    2/6/2017  8:03 PM Sylvia Bolanos MD [facility] [id number]      Care Providers     Provider Role Specialty Primary office phone    Sylvia Bolanos MD Attending Provider Family Medicine [phone]      Your Vitals Were     BP Pulse Temp Resp Height Weight    120/56 79 99.1 °F (37.3 °C) (Oral) 16 5' 1" (1.549 m) 63.5 kg (140 lb)    Last Period SpO2 BMI          (LMP Unknown) 93% 26.45 kg/m2        Recent Lab Values        2/7/2017                           5:15 AM           A1C 5.8           Comment for A1C at  5:15 AM on 2/7/2017:  According to ADA guidelines, hemoglobin A1C <7.0% represents  optimal control in non-pregnant diabetic patients.  Different  metrics may apply to specific populations.   Standards of Medical Care in Diabetes - 2016.  For the purpose of screening for the " presence of diabetes:  <5.7%     Consistent with the absence of diabetes  5.7-6.4%  Consistent with increasing risk for diabetes   (prediabetes)  >or=6.5%  Consistent with diabetes  Currently no consensus exists for use of hemoglobin A1C  for diagnosis of diabetes for children.        Allergies as of 2/8/2017        Reactions    Epinephrine     Seizure like activity    Pcn [Penicillins]       Advance Directives     An advance directive is a document which, in the event you are no longer able to make decisions for yourself, tells your healthcare team what kind of treatment you do or do not want to receive, or who you would like to make those decisions for you.  If you do not currently have an advance directive, [facility] encourages you to create one.  For more information call:  [phone]), [phone] ([phone]),  or log on to [email or web address].        Language Assistance Services     ATTENTION: Language assistance services are available, free of charge. Please call [phone].      ATENCIÓN: Si habla español, tiene a barrientos disposición servicios gratuitos de asistencia lingüística. Llame al [phone].     CHÚ Ý: N?u b?n nói Ti?ng Vi?t, có các d?ch v? h? tr? ngôn ng? mi?n phí dành cho b?n. G?i s? [phone].         [facility] complies with applicable Federal civil rights laws and does not discriminate on the basis of race, color, national origin, age, disability, or sex.

## 2017-02-06 NOTE — TELEPHONE ENCOUNTER
----- Message from João Szymanski sent at 2/6/2017  9:51 AM CST -----  Contact: son  Pt son Jeronimo  is calling in to schedule an appointment for the pt asap.     Pt had fell and his experience some swelling on the foot and is having some neck pain .   .  Pt has also bumped her head also      Please contact pt son Jeronimo  for more information needed at [phone].

## 2017-02-06 NOTE — TELEPHONE ENCOUNTER
Reason for Disposition   Patient wants to be seen    Protocols used: ST HEAD INJURY-A-[place]    Attempted to schedule appointment, unable to and transferred to central scheduling.

## 2017-02-07 ENCOUNTER — TELEPHONE (OUTPATIENT)
Dept: FAMILY MEDICINE | Facility: CLINIC | Age: 82
End: 2017-02-07

## 2017-02-07 PROBLEM — R73.9 HYPERGLYCEMIA: Status: ACTIVE | Noted: 2017-02-07

## 2017-02-07 LAB
ALBUMIN SERPL BCP-MCNC: 3.3 G/DL
ALP SERPL-CCNC: 60 U/L
ALT SERPL W/O P-5'-P-CCNC: 19 U/L
ANION GAP SERPL CALC-SCNC: 8 MMOL/L
AST SERPL-CCNC: 35 U/L
BASOPHILS # BLD AUTO: 0 K/UL
BASOPHILS NFR BLD: 0.5 %
BILIRUB SERPL-MCNC: 0.4 MG/DL
BUN SERPL-MCNC: 16 MG/DL
CALCIUM SERPL-MCNC: 9.5 MG/DL
CHLORIDE SERPL-SCNC: 94 MMOL/L
CO2 SERPL-SCNC: 33 MMOL/L
CREAT SERPL-MCNC: 0.6 MG/DL
DIASTOLIC DYSFUNCTION: NO
DIFFERENTIAL METHOD: ABNORMAL
EOSINOPHIL # BLD AUTO: 0.1 K/UL
EOSINOPHIL NFR BLD: 1.5 %
ERYTHROCYTE [DISTWIDTH] IN BLOOD BY AUTOMATED COUNT: 14.2 %
EST. GFR  (AFRICAN AMERICAN): >60 ML/MIN/1.73 M^2
EST. GFR  (NON AFRICAN AMERICAN): >60 ML/MIN/1.73 M^2
ESTIMATED PA SYSTOLIC PRESSURE: 32.16
GLOBAL PERICARDIAL EFFUSION: NORMAL
GLUCOSE SERPL-MCNC: 96 MG/DL
HCT VFR BLD AUTO: 40.8 %
HGB BLD-MCNC: 13 G/DL
LYMPHOCYTES # BLD AUTO: 0.7 K/UL
LYMPHOCYTES NFR BLD: 11.3 %
MAGNESIUM SERPL-MCNC: 1.8 MG/DL
MCH RBC QN AUTO: 27.1 PG
MCHC RBC AUTO-ENTMCNC: 31.8 %
MCV RBC AUTO: 85 FL
MONOCYTES # BLD AUTO: 0.6 K/UL
MONOCYTES NFR BLD: 9.6 %
NEUTROPHILS # BLD AUTO: 4.8 K/UL
NEUTROPHILS NFR BLD: 77.1 %
PHOSPHATE SERPL-MCNC: 2.4 MG/DL
PLATELET # BLD AUTO: 214 K/UL
PMV BLD AUTO: 8.2 FL
POTASSIUM SERPL-SCNC: 4.3 MMOL/L
PROT SERPL-MCNC: 6.6 G/DL
RBC # BLD AUTO: 4.79 M/UL
RETIRED EF AND QEF - SEE NOTES: 57 (ref 55–65)
SODIUM SERPL-SCNC: 135 MMOL/L
TRICUSPID VALVE REGURGITATION: NORMAL
WBC # BLD AUTO: 6.3 K/UL

## 2017-02-07 PROCEDURE — G0378 HOSPITAL OBSERVATION PER HR: HCPCS

## 2017-02-07 PROCEDURE — 85025 COMPLETE CBC W/AUTO DIFF WBC: CPT

## 2017-02-07 PROCEDURE — 63600175 PHARM REV CODE 636 W HCPCS: Performed by: INTERNAL MEDICINE

## 2017-02-07 PROCEDURE — 94640 AIRWAY INHALATION TREATMENT: CPT

## 2017-02-07 PROCEDURE — 97162 PT EVAL MOD COMPLEX 30 MIN: CPT

## 2017-02-07 PROCEDURE — G8978 MOBILITY CURRENT STATUS: HCPCS | Mod: CN

## 2017-02-07 PROCEDURE — 80053 COMPREHEN METABOLIC PANEL: CPT

## 2017-02-07 PROCEDURE — 83735 ASSAY OF MAGNESIUM: CPT

## 2017-02-07 PROCEDURE — 84100 ASSAY OF PHOSPHORUS: CPT

## 2017-02-07 PROCEDURE — C8929 TTE W OR WO FOL WCON,DOPPLER: HCPCS

## 2017-02-07 PROCEDURE — 63600175 PHARM REV CODE 636 W HCPCS: Performed by: PHYSICIAN ASSISTANT

## 2017-02-07 PROCEDURE — 99900035 HC TECH TIME PER 15 MIN (STAT)

## 2017-02-07 PROCEDURE — 83036 HEMOGLOBIN GLYCOSYLATED A1C: CPT

## 2017-02-07 PROCEDURE — 36415 COLL VENOUS BLD VENIPUNCTURE: CPT

## 2017-02-07 PROCEDURE — 25000003 PHARM REV CODE 250: Performed by: FAMILY MEDICINE

## 2017-02-07 PROCEDURE — 97110 THERAPEUTIC EXERCISES: CPT

## 2017-02-07 PROCEDURE — 25000003 PHARM REV CODE 250: Performed by: PHYSICIAN ASSISTANT

## 2017-02-07 PROCEDURE — G8979 MOBILITY GOAL STATUS: HCPCS | Mod: CM

## 2017-02-07 RX ORDER — ONDANSETRON 2 MG/ML
4 INJECTION INTRAMUSCULAR; INTRAVENOUS EVERY 6 HOURS PRN
Status: DISCONTINUED | OUTPATIENT
Start: 2017-02-07 | End: 2017-02-08 | Stop reason: HOSPADM

## 2017-02-07 RX ORDER — CLONAZEPAM 0.5 MG/1
0.5 TABLET ORAL 2 TIMES DAILY PRN
Status: DISCONTINUED | OUTPATIENT
Start: 2017-02-07 | End: 2017-02-08 | Stop reason: HOSPADM

## 2017-02-07 RX ORDER — FLUTICASONE FUROATE AND VILANTEROL 100; 25 UG/1; UG/1
1 POWDER RESPIRATORY (INHALATION) DAILY
Status: DISCONTINUED | OUTPATIENT
Start: 2017-02-07 | End: 2017-02-08 | Stop reason: HOSPADM

## 2017-02-07 RX ORDER — AMOXICILLIN 250 MG
1 CAPSULE ORAL 2 TIMES DAILY PRN
Status: DISCONTINUED | OUTPATIENT
Start: 2017-02-07 | End: 2017-02-08 | Stop reason: HOSPADM

## 2017-02-07 RX ORDER — IPRATROPIUM BROMIDE AND ALBUTEROL SULFATE 2.5; .5 MG/3ML; MG/3ML
3 SOLUTION RESPIRATORY (INHALATION) EVERY 6 HOURS PRN
Status: DISCONTINUED | OUTPATIENT
Start: 2017-02-07 | End: 2017-02-07

## 2017-02-07 RX ORDER — IPRATROPIUM BROMIDE AND ALBUTEROL SULFATE 2.5; .5 MG/3ML; MG/3ML
3 SOLUTION RESPIRATORY (INHALATION) EVERY 6 HOURS PRN
Status: DISCONTINUED | OUTPATIENT
Start: 2017-02-07 | End: 2017-02-08 | Stop reason: HOSPADM

## 2017-02-07 RX ORDER — POTASSIUM CHLORIDE 750 MG/1
10 TABLET, EXTENDED RELEASE ORAL DAILY
Status: DISCONTINUED | OUTPATIENT
Start: 2017-02-07 | End: 2017-02-08 | Stop reason: HOSPADM

## 2017-02-07 RX ORDER — TROSPIUM CHLORIDE 20 MG/1
20 TABLET, FILM COATED ORAL 2 TIMES DAILY
COMMUNITY
End: 2017-03-27

## 2017-02-07 RX ORDER — POTASSIUM CHLORIDE 750 MG/1
10 TABLET, EXTENDED RELEASE ORAL DAILY
Qty: 1 TABLET | Refills: 30 | Status: SHIPPED | OUTPATIENT
Start: 2017-02-07 | End: 2017-02-08 | Stop reason: HOSPADM

## 2017-02-07 RX ORDER — PROPRANOLOL HYDROCHLORIDE 20 MG/1
20 TABLET ORAL 2 TIMES DAILY
Status: DISCONTINUED | OUTPATIENT
Start: 2017-02-07 | End: 2017-02-08 | Stop reason: HOSPADM

## 2017-02-07 RX ORDER — FUROSEMIDE 20 MG/1
20 TABLET ORAL 2 TIMES DAILY
Status: DISCONTINUED | OUTPATIENT
Start: 2017-02-07 | End: 2017-02-08 | Stop reason: HOSPADM

## 2017-02-07 RX ORDER — CLONAZEPAM 0.5 MG/1
0.5 TABLET ORAL 2 TIMES DAILY
Status: DISCONTINUED | OUTPATIENT
Start: 2017-02-07 | End: 2017-02-07

## 2017-02-07 RX ORDER — PANTOPRAZOLE SODIUM 40 MG/1
40 TABLET, DELAYED RELEASE ORAL DAILY
Status: DISCONTINUED | OUTPATIENT
Start: 2017-02-07 | End: 2017-02-08 | Stop reason: HOSPADM

## 2017-02-07 RX ORDER — ENOXAPARIN SODIUM 100 MG/ML
40 INJECTION SUBCUTANEOUS EVERY 24 HOURS
Status: DISCONTINUED | OUTPATIENT
Start: 2017-02-07 | End: 2017-02-08 | Stop reason: HOSPADM

## 2017-02-07 RX ORDER — ASPIRIN 81 MG/1
81 TABLET ORAL DAILY
Status: DISCONTINUED | OUTPATIENT
Start: 2017-02-07 | End: 2017-02-08 | Stop reason: HOSPADM

## 2017-02-07 RX ORDER — ACETAMINOPHEN 500 MG
1000 TABLET ORAL EVERY 6 HOURS PRN
Status: DISCONTINUED | OUTPATIENT
Start: 2017-02-07 | End: 2017-02-08 | Stop reason: HOSPADM

## 2017-02-07 RX ORDER — FLUTICASONE FUROATE AND VILANTEROL 100; 25 UG/1; UG/1
1 POWDER RESPIRATORY (INHALATION) DAILY
Status: DISCONTINUED | OUTPATIENT
Start: 2017-02-07 | End: 2017-02-07

## 2017-02-07 RX ADMIN — CLONAZEPAM 0.5 MG: 0.5 TABLET ORAL at 09:02

## 2017-02-07 RX ADMIN — FLUTICASONE FUROATE AND VILANTEROL TRIFENATATE 1 PUFF: 100; 25 POWDER RESPIRATORY (INHALATION) at 08:02

## 2017-02-07 RX ADMIN — ASPIRIN 81 MG: 81 TABLET, COATED ORAL at 09:02

## 2017-02-07 RX ADMIN — FUROSEMIDE 20 MG: 20 TABLET ORAL at 05:02

## 2017-02-07 RX ADMIN — HUMAN ALBUMIN MICROSPHERES AND PERFLUTREN 0.11 MG: 10; .22 INJECTION, SOLUTION INTRAVENOUS at 03:02

## 2017-02-07 RX ADMIN — FUROSEMIDE 20 MG: 20 TABLET ORAL at 09:02

## 2017-02-07 RX ADMIN — PROPRANOLOL HYDROCHLORIDE 20 MG: 20 TABLET ORAL at 09:02

## 2017-02-07 RX ADMIN — ENOXAPARIN SODIUM 40 MG: 100 INJECTION SUBCUTANEOUS at 11:02

## 2017-02-07 RX ADMIN — PSYLLIUM HUSK 1 PACKET: 3.4 POWDER ORAL at 09:02

## 2017-02-07 RX ADMIN — PANTOPRAZOLE SODIUM 40 MG: 40 TABLET, DELAYED RELEASE ORAL at 09:02

## 2017-02-07 RX ADMIN — POTASSIUM CHLORIDE 10 MEQ: 10 TABLET, EXTENDED RELEASE ORAL at 09:02

## 2017-02-07 NOTE — PLAN OF CARE
Problem: Physical Therapy Goal  Goal: Physical Therapy Goal  Goals to be met by: 2017     Patient will increase functional independence with mobility by performin. Supine to sit with Modified [place]  2. Sit to supine with Modified [place]  3. Sit to stand transfer with Modified [place]  4. Bed to chair transfer with Modified [place] using Rolling Walker  5. Gait x 50 feet with Stand-by Assistance using Rolling Walker.    PT vicenteal completed and POC initiated

## 2017-02-07 NOTE — H&P
[facility] Medicine  History & Physical    Patient Name: Jennifer Diaz  MRN: [id number]  Admission Date: 2/6/2017  Attending Physician: Sylvia Bolanos MD   Primary Care Provider: Catrachito Mart MD         Patient information was obtained from patient and past medical records.     Subjective:     Principal Problem:Right leg weakness    Chief Complaint:   Chief Complaint   Patient presents with    Fall     presents for admission from Dr Mart's office    Foot Injury    Extremity Weakness     rt. leg        HPI: Pt says she is here because she fell down at home last week and her family is worried she had a stroke. She states she only lost her balance. She denies LOC but says she sprained her R ankle at the time. She denies any significant medical problems besides asthma. She lives alone and is able to do her activities of daily living. She denies any current weakness, CP or SOB. She does admit she has been having swelling in her legs recently.     Past Medical History   Diagnosis Date    Acid reflux     Family history of breast cancer     Family history of diabetes mellitus     Intrinsic asthma     Psoriasis        Past Surgical History   Procedure Laterality Date    Total abdominal hysterectomy w/ bilateral salpingoophorectomy  6/2012    Tonsillectomy, adenoidectomy  as child    Dilation and curettage of uterus       x2    Umbilical hernia repair         Review of patient's allergies indicates:   Allergen Reactions    Epinephrine      Seizure like activity      Pcn [penicillins]        No current facility-administered medications on file prior to encounter.      Current Outpatient Prescriptions on File Prior to Encounter   Medication Sig    albuterol (PROAIR HFA) 90 mcg/actuation inhaler Inhale 2 puffs into the lungs every 6 (six) hours as needed.    albuterol-ipratropium 2.5mg-0.5mg/3mL (DUO-NEB) 0.5 mg-3 mg(2.5 mg base)/3 mL nebulizer solution Take 3 mLs by  nebulization every 6 (six) hours as needed for Wheezing.    budesonide-formoterol 80-4.5 mcg (SYMBICORT) 80-4.5 mcg/actuation HFAA Inhale 2 puffs into the lungs once daily.    clonazePAM (KLONOPIN) 0.5 MG tablet 0.25 mg po bid (Patient taking differently: 0.5 mg once daily. 0.25 mg po bid)    fluticasone (FLONASE) 50 mcg/actuation nasal spray 2 sprays by Each Nare route once daily.    furosemide (LASIX) 20 MG tablet Take 1 tablet (20 mg total) by mouth 2 (two) times daily.    polyethylene glycol (GLYCOLAX) 17 gram/dose powder Take 17 g by mouth 2 (two) times daily as needed (Constipation).    potassium chloride (KLOR-CON) 10 MEQ TbSR Take 1 tablet (10 mEq total) by mouth once daily.    propranolol (INDERAL) 20 MG tablet Take 1 tablet (20 mg total) by mouth 2 (two) times daily. For tremors    [DISCONTINUED] aspirin (ECOTRIN) 81 MG EC tablet Take 81 mg by mouth once daily.    [DISCONTINUED] aspirin 81 MG Chew Take 81 mg by mouth once daily.     Family History     Problem Relation (Age of Onset)    Cancer Mother, Sister    Diabetes Brother    Heart attack Father        Social History Main Topics    Smoking status: Never Smoker    Smokeless tobacco: Never Used    Alcohol use No    Drug use: No    Sexual activity: Not on file     Review of Systems   Constitutional: Negative for activity change, appetite change, chills, diaphoresis, fatigue, fever and unexpected weight change.   HENT: Negative for congestion, postnasal drip, sinus pressure and tinnitus.    Respiratory: Negative for chest tightness and shortness of breath.    Cardiovascular: Positive for leg swelling. Negative for chest pain and palpitations.   Gastrointestinal: Positive for constipation (chronic). Negative for abdominal distention, abdominal pain, nausea and vomiting.   Genitourinary: Negative for dysuria.   Musculoskeletal: Positive for arthralgias.   Neurological: Negative for dizziness, facial asymmetry, speech difficulty, weakness,  light-headedness and headaches.   Psychiatric/Behavioral: Negative for confusion.     Objective:     Vital Signs (Most Recent):  Temp: 97.8 °F (36.6 °C) (02/07/17 1700)  Pulse: 66 (02/07/17 1700)  Resp: 18 (02/07/17 1700)  BP: 134/61 (02/07/17 1700)  SpO2: 95 % (02/07/17 1700) Vital Signs (24h Range):  Temp:  [97.8 °F (36.6 °C)-98.6 °F (37 °C)] 97.8 °F (36.6 °C)  Pulse:  [66-96] 66  Resp:  [16-20] 18  SpO2:  [92 %-100 %] 95 %  BP: (105-145)/(55-68) 134/61     Weight: 63.5 kg (140 lb)  Body mass index is 26.45 kg/(m^2).    Physical Exam   Constitutional: She is oriented to person, place, and time. She appears well-developed and well-nourished. No distress.   HENT:   Head: Normocephalic and atraumatic.   Right Ear: External ear normal.   Left Ear: External ear normal.   Nose: Nose normal.   Mouth/Throat: Oropharynx is clear and moist. No oropharyngeal exudate.   Eyes: Conjunctivae and EOM are normal. Pupils are equal, round, and reactive to light.   Neck: Neck supple. No JVD present. No thyromegaly present.   Cardiovascular: Normal rate, regular rhythm and normal heart sounds.    Pulmonary/Chest: Effort normal and breath sounds normal.   Abdominal: Soft. Normal appearance. She exhibits no distension. Bowel sounds are increased. There is no tenderness.   Central obesity   Musculoskeletal: She exhibits edema (1-2+ up to calves ).        Right ankle: She exhibits swelling (wrapped in bandages for stability). She exhibits no deformity and normal pulse. No lateral malleolus and no medial malleolus tenderness found.        Left foot: There is swelling. There is normal range of motion, no tenderness and normal capillary refill.        Feet:    Lymphadenopathy:     She has no cervical adenopathy.   Neurological: She is alert and oriented to person, place, and time. She has normal strength. She displays normal reflexes. No cranial nerve deficit or sensory deficit (gross). Coordination normal. GCS eye subscore is 4. GCS verbal  subscore is 5. GCS motor subscore is 6.   Reflex Scores:       Tricep reflexes are 2+ on the right side and 2+ on the left side.       Patellar reflexes are 2+ on the right side and 2+ on the left side.  Skin: No erythema.   Psychiatric: She has a normal mood and affect. Her behavior is normal.   Vitals reviewed.       Significant Labs:   CBC:   Recent Labs  Lab 02/06/17 2139 02/07/17 0515   WBC 5.80 6.30   HGB 12.9 13.0   HCT 40.8 40.8    214     CMP:   Recent Labs  Lab 02/06/17 2139 02/07/17 0515   * 135*   K 4.4 4.3   CL 95 94*   CO2 32* 33*   * 96   BUN 16 16   CREATININE 0.6 0.6   CALCIUM 9.8 9.5   PROT 7.0 6.6   ALBUMIN 3.5 3.3*   BILITOT 0.5 0.4   ALKPHOS 65 60   AST 38 35   ALT 20 19   ANIONGAP 8 8   EGFRNONAA >60 >60     Troponin:   Recent Labs  Lab 02/06/17 2139   TROPONINI 0.008     All pertinent labs within the past 24 hours have been reviewed.    Significant Imaging: I have reviewed all pertinent imaging results/findings within the past 24 hours.   IMPRESSION CT head    Mild involutional changes and findings consistent with microvascular ischemic change with no acute intracranial findings.    Assessment/Plan:     * Right leg weakness  PT rehabilitation. Fall precaution. Likely not 2/2 CVA.  Fu MRI/ carotid u/s and echo.       Intention tremor  Continue home propranolol.      Moderate persistent asthma without complication  Continue nebs and home inhalers. No acute exacerbation.      Hyperglycemia  Blood sugar elevated. Check A1C.       VTE Risk Mitigation         Ordered     enoxaparin injection 40 mg  Daily     Route:  Subcutaneous        02/07/17 0011     Medium Risk of VTE  Once      02/07/17 0011        Sylvia Bolanos MD  Department of Hospital Medicine   [facility]

## 2017-02-07 NOTE — TELEPHONE ENCOUNTER
Spoke with patient to let her know that we do not know when she can be released from the hospital that it was up to the Dr there.

## 2017-02-07 NOTE — TELEPHONE ENCOUNTER
----- Message from Alfredo Lassiter sent at 2/7/2017  2:56 PM CST -----  Contact: Self  Patient calling in to inquire when she will get out the hospital. Line dropped. Please call patient back

## 2017-02-07 NOTE — PLAN OF CARE
Problem: Patient Care Overview  Goal: Plan of Care Review  Outcome: Ongoing (interventions implemented as appropriate)  Pt awake in bed. No c/o pain or distress. AAOx3. Dx: R leg weakness. Plan of care reviewed. Questions answered. Verbalized understanding. Fall precautions. Up w/assist. BSC. 3+ edema to BLEs. R foot wrapped w/soft gauze. +pulses. Reg diet. Daily wt. Neuro checks q4hrs. No further complaints. Call light in reach. Bed alarm in place. Siderails up. Will cont to monitor.

## 2017-02-07 NOTE — ED NOTES
Presents to the ER per Dr. Mart's request for CVA work up. Patient reports falling 3 days ago, hitting head. Denies LOC. Patient reports increased right  lower extremity weakness for the past month. 3+ edema to bilateral lower extremities with right foot pain. Patient reports that she ambulates at home with a walker. AAOx4. Bilateral hand  are strong and equal, + sensation to upper and lower extremities. - pronator drift. Mucous membranes are pink and moist. Skin is warm, dry and intact. Lungs are clear bilaterally, respirations are regular and unlabored. Denies cough, congestion, rhinorrhea or SOB. BS active x4, no tenderness with palpation, abd is soft and not distended. Denies any appetite or activity change. S1S2, capillary refill is < 2 seconds. Denies dysuria, difficulty urinating, frequency, numbness, tingling or weakness. JOSE MIGUEL BERTRAND

## 2017-02-07 NOTE — ED NOTES
Pt's son provided us with a current copy of a Power of  dated and notarized today per Dr. Aguilar's request.  Copy will be forwarded to floor with pt.

## 2017-02-07 NOTE — PLAN OF CARE
"PCP is Dr Mart.  Verified insurance as [employer].  Patient lives alone; independent with cooking, eating, bathing, dressing.  Patient states she needs helps with cleaning; pt's son lives close by however patient stated "that's not his thing".  Informed patient that insurance doesn't pay for housekeeping; verbalized understanding.  Patient transports by "[employer] informed patient that [employer] also offers transportation with advance notice.  Patient does her own grocery shopping; states "I call my order in to [employer] and the transportation kayleigh goes in with my card when we get there to pay".  Patient has 3 children: Key in [place], Kevin in [place] and Jeronimo in [place]; no POA.  Patient states that the reason she kept falling was because her bed was so high requiring 2 step stools however her son has lowered her bed so she no longer needs a step stool to get in.       02/07/17 1542   Discharge Assessment   Assessment Type Discharge Planning Assessment   Confirmed/corrected address and phone number on facesheet? Yes   Assessment information obtained from? Patient   Type of Healthcare Directive Received (No POA)   Prior to hospitilization cognitive status: Alert/Oriented   Prior to hospitalization functional status: Independent;Assistive Equipment   Current cognitive status: Alert/Oriented   Current Functional Status: Independent;Assistive Equipment   Arrived From home or self-care   Lives With alone   Able to Return to Prior Arrangements yes   Is patient able to care for self after discharge? Yes   How many people do you have in your home that can help with your care after discharge? 0   Patient's perception of discharge disposition home or selfcare   Readmission Within The Last 30 Days no previous admission in last 30 days   Patient currently being followed by outpatient case management? No   Patient currently receives home health services? No   Does the patient currently use HME? Yes   Patient currently " receives private duty nursing? No   Patient currently receives any other outside agency services? No   Equipment Currently Used at Home shower chair;rollator;other (see comments)  (nebulizer)   Do you have any problems affording any of your prescribed medications? No   Is the patient taking medications as prescribed? yes   Do you have any financial concerns preventing you from receiving the healthcare you need? No   Does the patient have transportation to healthcare appointments? Yes   Transportation Available agency transportation  ([facility])   On Dialysis? No   Does the patient receive services at the Coumadin Clinic? No   Discharge Plan A Home   Discharge Plan B Home Health   Patient/Family In Agreement With Plan yes

## 2017-02-07 NOTE — ED PROVIDER NOTES
Encounter Date: 2/6/2017    SCRIBE #1 NOTE: I, Malika Leger, am scribing for, and in the presence of, Dr. Aguilar.   SCRIBE #2 NOTE: I, Bree Hart, am scribing for, and in the presence of, Dr Aguilar. the EKG reading.     History     Chief Complaint   Patient presents with    Fall     presents for admission from Dr Mart's office    Foot Injury    Extremity Weakness     rt. leg     Review of patient's allergies indicates:   Allergen Reactions    Epinephrine      Seizure like activity      Pcn [penicillins]      HPI Comments:   02/06/2017 8:08 PM     Chief Complaint: Multiple complaints      The patient is a 84 y.o. female who presents to the ED with multiple complaints: an acute onset of head injury status post fall 3 days ago, RLE weakness that began a month ago, worsening bilateral chronic leg swelling, and shooting left foot pain. She was referred here for admission by Dr. Mart for CVA. The patient reports that she has not had a BM in a couple of days. She ambulates with a walker. The patient denies visual changes, abdominal pain, back pain, speech difficulty, weakness in RUE, or any other symptoms at this time. No pertinent SHx noted. Penicillin and Epinephrine drug allergies noted.    The history is provided by the patient and a relative (son).     Past Medical History   Diagnosis Date    Acid reflux     Family history of breast cancer     Family history of diabetes mellitus     Intrinsic asthma     Psoriasis      No past medical history pertinent negatives.  Past Surgical History   Procedure Laterality Date    Total abdominal hysterectomy w/ bilateral salpingoophorectomy  6/2012    Tonsillectomy, adenoidectomy  as child    Dilation and curettage of uterus       x2    Umbilical hernia repair       Family History   Problem Relation Age of Onset    Cancer Mother      pancreas    Heart attack Father     Cancer Sister      breast    Diabetes Brother      Social History   Substance Use Topics     Smoking status: Never Smoker    Smokeless tobacco: Never Used    Alcohol use No     Review of Systems   Constitutional: Negative for chills and fever.   HENT: Negative for congestion, rhinorrhea, sneezing and sore throat.    Eyes: Negative for visual disturbance.   Respiratory: Negative for cough and shortness of breath.    Cardiovascular: Positive for leg swelling. Negative for chest pain and palpitations.   Gastrointestinal: Negative for abdominal pain, diarrhea, nausea and vomiting.   Genitourinary: Negative for dysuria and hematuria.   Musculoskeletal: Positive for arthralgias (left foot). Negative for back pain and neck pain.   Skin: Negative for rash.   Neurological: Positive for weakness. Negative for seizures, syncope, speech difficulty and headaches.     Physical Exam   Initial Vitals   BP Pulse Resp Temp SpO2   02/06/17 1724 02/06/17 1724 02/06/17 1724 02/06/17 1724 02/06/17 1724   137/64 68 20 98.1 °F (36.7 °C) 96 %     Physical Exam    Nursing note and vitals reviewed.  Constitutional: She appears well-developed and well-nourished. She is not diaphoretic. No distress.   HENT:   Head: Normocephalic and atraumatic.   Eyes: EOM are normal. Pupils are equal, round, and reactive to light.   Neck: Normal range of motion. Neck supple.   Cardiovascular: Normal rate and regular rhythm. Exam reveals no gallop and no friction rub.    No murmur heard.  1-2 pulses, bilaterally.    Pulmonary/Chest: Breath sounds normal. No respiratory distress. She has no wheezes. She has no rhonchi. She has no rales.   Abdominal: Soft. She exhibits no distension. There is no tenderness. There is no rebound and no guarding.   Musculoskeletal: Normal range of motion. She exhibits edema (3+ bilateral LE edema). She exhibits no tenderness (C-T-L tenderness with dependent rubor).        Left ankle: She exhibits swelling and ecchymosis.   Left plantar mass with unclear etiology.    Neurological: She is alert and oriented to person,  place, and time. She has normal strength. No cranial nerve deficit or sensory deficit.   4/5 RLE strength. Otherwise, 5/5 strength.    Skin: Skin is warm and dry. No rash noted. No erythema.   Psychiatric: She has a normal mood and affect. Her behavior is normal. Judgment and thought content normal.       ED Course   Procedures  Labs Reviewed   CBC W/ AUTO DIFFERENTIAL - Abnormal; Notable for the following:        Result Value    MCH 26.9 (*)     MCHC 31.6 (*)     RDW 14.7 (*)     MPV 7.8 (*)     Lymph # 0.7 (*)     Gran% 75.1 (*)     Lymph% 12.2 (*)     All other components within normal limits   COMPREHENSIVE METABOLIC PANEL - Abnormal; Notable for the following:     Sodium 135 (*)     CO2 32 (*)     Glucose 121 (*)     All other components within normal limits   PROTIME-INR   TROPONIN I   B-TYPE NATRIURETIC PEPTIDE     Imaging Results         CT Head Without Contrast (In process)         X-Ray Chest 1 View (In process)         EKG Readings: (Independently Interpreted)   Rhythm: Normal Sinus Rhythm. Heart Rate: 78. Conduction: Normal.   No ST elevation or depression.         Medical Decision Making:   History:   I obtained history from: someone other than patient.       <> Summary of History: Son gave most of history  Old Medical Records: I decided to obtain old medical records.  Initial Assessment:   Patient with history of several falls and right leg weakness with unclear etiology.   Differential Diagnosis:   My differential diagnosis includes, but not limited to, stroke, cauda equina syndrome, drop foot, and weakness.   Independently Interpreted Test(s):   I have ordered and independently interpreted X-rays - see summary below.       <> Summary of X-Ray Reading(s): CXR: nad  Head CT: nad  I have ordered and independently interpreted EKG Reading(s) - see prior notes  Clinical Tests:   Lab Tests: Reviewed and Ordered  Radiological Study: Ordered  Medical Tests: Ordered  ED Management:  Will perform large work-up  including head CT and likely observe in hospital.     Patient with right lower extremity weakness.  Unclear etiology.   workup is unremarkable but patient primary doctor and family feels she needs to be admitted to the hospital.  We'll admit  Other:   I discussed test(s) with the performing physician.       <> Summary of the Findings: Head CT: nad  I have discussed this case with another health care provider.       <> Summary of the Discussion: IM            Link Attestation:   Scribe #1: I performed the above scribed service and the documentation accurately describes the services I performed. I attest to the accuracy of the note.  Scribe #2: I performed the above scribed service and the documentation accurately describes the services I performed. I attest to the accuracy of the note.    Attending Attestation:           Physician Attestation for Scribe:  Physician Attestation Statement for Scribe #1: I, Dr. Aguilar, reviewed documentation, as scribed by Malika Leger in my presence, and it is both accurate and complete.   Physician Attestation Statement for Scribe #2: I, Dr Aguilar, reviewed documentation, as scribed by Bree Hart in my presence, and it is both accurate and complete. I also acknowledge and confirm the content of the note done by Clayibe #1.              ED Course     Clinical Impression:     1. Right leg weakness    2. Weakness                Adryan Aguilar III, MD  02/07/17 0254

## 2017-02-07 NOTE — ED NOTES
Upon transfer to room 306, patient is AAOx4 no cardiac or respiratory complications. No needs or questions at this time. Power of  has given to ER tech to give to patient's nurse.

## 2017-02-07 NOTE — PT/OT/SLP EVAL
Physical Therapy  Evaluation    Jennifer Diaz   MRN: [id number]   Admitting Diagnosis: Right leg weakness    PT Received On: 17  PT Start Time: 745     PT Stop Time: 808    PT Total Time (min): 23 min       Billable Minutes:  Evaluation 15 and Therapeutic Exercise 8    Diagnosis: Right leg weakness      Past Medical History   Diagnosis Date    Acid reflux     Family history of breast cancer     Family history of diabetes mellitus     Intrinsic asthma     Psoriasis       Past Surgical History   Procedure Laterality Date    Total abdominal hysterectomy w/ bilateral salpingoophorectomy  2012    Tonsillectomy, adenoidectomy  as child    Dilation and curettage of uterus       x2    Umbilical hernia repair         Referring physician: Asif  Date referred to PT: 2017    General Precautions: Standard, fall  Orthopedic Precautions: N/A   Braces:              Patient History:  Lives With: alone  Living Arrangements: house  Home Accessibility:  (1 small step to enter garage)  Equipment Currently Used at Home: rollator, shower chair  DME owned (not currently used): none    Previous Level of Function:  Ambulation Skills: needs device  Transfer Skills: needs device  ADL Skills: needs device and assist    Subjective:  Communicated with Brandie moreno prior to session.    Chief Complaint: wants to go home  Patient goals: return to her home    Pain Ratin/10               Pain Rating Post-Intervention: 0/10    Objective:         Cognitive Exam:  Oriented to: Person, Place and Situation    Follows Commands/attention: Follows one-step commands  Communication: clear/fluent  Safety awareness/insight to disability: impaired    Physical Exam:  Postural examination/scapula alignment: Rounded shoulder    Skin integrity: Wound R foot wrapped  Edema: Moderate BLEs      Lower Extremity Range of Motion:  Right Lower Extremity: WFL  Left Lower Extremity: WFL    Lower Extremity Strength:  Right Lower Extremity:  Deficits: ankle df 3-/5, knee ext and hip flex 4/5  Left Lower Extremity: WNL       Gross motor coordination: WFL    Functional Mobility:  Bed Mobility:  Supine to Sit: Stand by Assistance, With side rail    Transfers:  Sit <> Stand Assistance: Stand By Assistance (3 trials)  Sit <> Stand Assistive Device: Rolling Walker  Bed <> Chair Technique: Stand Pivot  Bed <> Chair Assistance: Stand By Assistance  Bed <> Chair Assistive Device: Rolling Walker    Gait:   Gait Distance: 20'  Assistance 1: Minimum assistance  Gait Assistive Device: Rolling walker  Gait Deviation(s): decreased deya, decreased velocity of limb motion, decreased step length, decreased toe-to-floor clearance, decreased weight-shifting ability      Balance:   Static Sit: FAIR+: Able to take MINIMAL challenges from all directions  Dynamic Sit: FAIR+: Maintains balance through MINIMAL excursions of active trunk motion  Static Stand: FAIR: Maintains without assist but unable to take challenges  Dynamic stand: FAIR: Needs CONTACT GUARD during gait    Therapeutic Activities and Exercises:  Ambulated to doorway and back to bed. Sat EOB and participated with AP, LAQ, hip flex/abd/add x15 reps. Stand pivot to chair for breakfast.        Patient left up in chair with all lines intact and call button in reach.    Assessment:   Jennifer Diaz is a 84 y.o. female with a medical diagnosis of Right leg weakness and presents with recent falls and impaired insight into disabilities as pt is determined to return home alone. Recommend that pt use RW instead of rollator (son states that pt flexes forward and rests forearms on  of rollator at home but pt did well using RW for PT eval).    Rehab identified problem list/impairments: Rehab identified problem list/impairments: weakness, impaired endurance, impaired self care skills, impaired functional mobilty, gait instability, impaired balance, impaired skin, decreased safety awareness, edema    Rehab  potential is fair.    Activity tolerance: Fair    Discharge recommendations: Discharge Facility/Level Of Care Needs: nursing facility, skilled, rehabilitation facility     Barriers to discharge: Barriers to Discharge: Decreased caregiver support    Equipment recommendations: Equipment Needed After Discharge: walker, rolling     GOALS:   Physical Therapy Goals        Problem: Physical Therapy Goal    Goal Priority Disciplines Outcome Goal Variances Interventions   Physical Therapy Goal     PT/OT, PT      Description:  Goals to be met by: 2017     Patient will increase functional independence with mobility by performin. Supine to sit with Modified [place]  2. Sit to supine with Modified [place]  3. Sit to stand transfer with Modified [place]  4. Bed to chair transfer with Modified [place] using Rolling Walker  5. Gait  x 50 feet with Stand-by Assistance using Rolling Walker.                 PLAN:    Patient to be seen 6 x/week to address the above listed problems via gait training, therapeutic activities, therapeutic exercises  Plan of Care expires: 17  Plan of Care reviewed with: katie Araya, PT  2017

## 2017-02-07 NOTE — SUBJECTIVE & OBJECTIVE
Past Medical History   Diagnosis Date    Acid reflux     Family history of breast cancer     Family history of diabetes mellitus     Intrinsic asthma     Psoriasis        Past Surgical History   Procedure Laterality Date    Total abdominal hysterectomy w/ bilateral salpingoophorectomy  6/2012    Tonsillectomy, adenoidectomy  as child    Dilation and curettage of uterus       x2    Umbilical hernia repair         Review of patient's allergies indicates:   Allergen Reactions    Epinephrine      Seizure like activity      Pcn [penicillins]        No current facility-administered medications on file prior to encounter.      Current Outpatient Prescriptions on File Prior to Encounter   Medication Sig    albuterol (PROAIR HFA) 90 mcg/actuation inhaler Inhale 2 puffs into the lungs every 6 (six) hours as needed.    albuterol-ipratropium 2.5mg-0.5mg/3mL (DUO-NEB) 0.5 mg-3 mg(2.5 mg base)/3 mL nebulizer solution Take 3 mLs by nebulization every 6 (six) hours as needed for Wheezing.    budesonide-formoterol 80-4.5 mcg (SYMBICORT) 80-4.5 mcg/actuation HFAA Inhale 2 puffs into the lungs once daily.    clonazePAM (KLONOPIN) 0.5 MG tablet 0.25 mg po bid (Patient taking differently: 0.5 mg once daily. 0.25 mg po bid)    fluticasone (FLONASE) 50 mcg/actuation nasal spray 2 sprays by Each Nare route once daily.    furosemide (LASIX) 20 MG tablet Take 1 tablet (20 mg total) by mouth 2 (two) times daily.    polyethylene glycol (GLYCOLAX) 17 gram/dose powder Take 17 g by mouth 2 (two) times daily as needed (Constipation).    potassium chloride (KLOR-CON) 10 MEQ TbSR Take 1 tablet (10 mEq total) by mouth once daily.    propranolol (INDERAL) 20 MG tablet Take 1 tablet (20 mg total) by mouth 2 (two) times daily. For tremors    [DISCONTINUED] aspirin (ECOTRIN) 81 MG EC tablet Take 81 mg by mouth once daily.    [DISCONTINUED] aspirin 81 MG Chew Take 81 mg by mouth once daily.     Family History     Problem Relation  (Age of Onset)    Cancer Mother, Sister    Diabetes Brother    Heart attack Father        Social History Main Topics    Smoking status: Never Smoker    Smokeless tobacco: Never Used    Alcohol use No    Drug use: No    Sexual activity: Not on file     Review of Systems   Constitutional: Negative for activity change, appetite change, chills, diaphoresis, fatigue, fever and unexpected weight change.   HENT: Negative for congestion, postnasal drip, sinus pressure and tinnitus.    Respiratory: Negative for chest tightness and shortness of breath.    Cardiovascular: Positive for leg swelling. Negative for chest pain and palpitations.   Gastrointestinal: Positive for constipation (chronic). Negative for abdominal distention, abdominal pain, nausea and vomiting.   Genitourinary: Negative for dysuria.   Musculoskeletal: Positive for arthralgias.   Neurological: Negative for dizziness, facial asymmetry, speech difficulty, weakness, light-headedness and headaches.   Psychiatric/Behavioral: Negative for confusion.     Objective:     Vital Signs (Most Recent):  Temp: 97.8 °F (36.6 °C) (02/07/17 1700)  Pulse: 66 (02/07/17 1700)  Resp: 18 (02/07/17 1700)  BP: 134/61 (02/07/17 1700)  SpO2: 95 % (02/07/17 1700) Vital Signs (24h Range):  Temp:  [97.8 °F (36.6 °C)-98.6 °F (37 °C)] 97.8 °F (36.6 °C)  Pulse:  [66-96] 66  Resp:  [16-20] 18  SpO2:  [92 %-100 %] 95 %  BP: (105-145)/(55-68) 134/61     Weight: 63.5 kg (140 lb)  Body mass index is 26.45 kg/(m^2).    Physical Exam   Constitutional: She is oriented to person, place, and time. She appears well-developed and well-nourished. No distress.   HENT:   Head: Normocephalic and atraumatic.   Right Ear: External ear normal.   Left Ear: External ear normal.   Nose: Nose normal.   Mouth/Throat: Oropharynx is clear and moist. No oropharyngeal exudate.   Eyes: Conjunctivae and EOM are normal. Pupils are equal, round, and reactive to light.   Neck: Neck supple. No JVD present. No  thyromegaly present.   Cardiovascular: Normal rate, regular rhythm and normal heart sounds.    Pulmonary/Chest: Effort normal and breath sounds normal.   Abdominal: Soft. Normal appearance. She exhibits no distension. Bowel sounds are increased. There is no tenderness.   Central obesity   Musculoskeletal: She exhibits edema (1-2+ up to calves ).        Right ankle: She exhibits swelling (wrapped in bandages for stability). She exhibits no deformity and normal pulse. No lateral malleolus and no medial malleolus tenderness found.        Left foot: There is swelling. There is normal range of motion, no tenderness and normal capillary refill.        Feet:    Lymphadenopathy:     She has no cervical adenopathy.   Neurological: She is alert and oriented to person, place, and time. She has normal strength. She displays normal reflexes. No cranial nerve deficit or sensory deficit (gross). Coordination normal. GCS eye subscore is 4. GCS verbal subscore is 5. GCS motor subscore is 6.   Reflex Scores:       Tricep reflexes are 2+ on the right side and 2+ on the left side.       Patellar reflexes are 2+ on the right side and 2+ on the left side.  Skin: No erythema.   Psychiatric: She has a normal mood and affect. Her behavior is normal.   Vitals reviewed.       Significant Labs:   CBC:   Recent Labs  Lab 02/06/17 2139 02/07/17  0515   WBC 5.80 6.30   HGB 12.9 13.0   HCT 40.8 40.8    214     CMP:   Recent Labs  Lab 02/06/17 2139 02/07/17  0515   * 135*   K 4.4 4.3   CL 95 94*   CO2 32* 33*   * 96   BUN 16 16   CREATININE 0.6 0.6   CALCIUM 9.8 9.5   PROT 7.0 6.6   ALBUMIN 3.5 3.3*   BILITOT 0.5 0.4   ALKPHOS 65 60   AST 38 35   ALT 20 19   ANIONGAP 8 8   EGFRNONAA >60 >60     Troponin:   Recent Labs  Lab 02/06/17 2139   TROPONINI 0.008     All pertinent labs within the past 24 hours have been reviewed.    Significant Imaging: I have reviewed all pertinent imaging results/findings within the past 24 hours.    IMPRESSION CT head    Mild involutional changes and findings consistent with microvascular ischemic change with no acute intracranial findings.

## 2017-02-08 VITALS
TEMPERATURE: 99 F | OXYGEN SATURATION: 93 % | HEIGHT: 61 IN | RESPIRATION RATE: 16 BRPM | WEIGHT: 140 LBS | BODY MASS INDEX: 26.43 KG/M2 | DIASTOLIC BLOOD PRESSURE: 56 MMHG | HEART RATE: 79 BPM | SYSTOLIC BLOOD PRESSURE: 120 MMHG

## 2017-02-08 LAB
ESTIMATED AVG GLUCOSE: 120 MG/DL
HBA1C MFR BLD HPLC: 5.8 %

## 2017-02-08 PROCEDURE — G0378 HOSPITAL OBSERVATION PER HR: HCPCS

## 2017-02-08 PROCEDURE — G8980 MOBILITY D/C STATUS: HCPCS | Mod: CN

## 2017-02-08 PROCEDURE — 97110 THERAPEUTIC EXERCISES: CPT

## 2017-02-08 PROCEDURE — 94640 AIRWAY INHALATION TREATMENT: CPT

## 2017-02-08 PROCEDURE — G8979 MOBILITY GOAL STATUS: HCPCS | Mod: CM

## 2017-02-08 PROCEDURE — 25000003 PHARM REV CODE 250: Performed by: FAMILY MEDICINE

## 2017-02-08 PROCEDURE — 99900035 HC TECH TIME PER 15 MIN (STAT)

## 2017-02-08 PROCEDURE — 97116 GAIT TRAINING THERAPY: CPT

## 2017-02-08 PROCEDURE — 94761 N-INVAS EAR/PLS OXIMETRY MLT: CPT

## 2017-02-08 PROCEDURE — 25000003 PHARM REV CODE 250: Performed by: PHYSICIAN ASSISTANT

## 2017-02-08 PROCEDURE — 63600175 PHARM REV CODE 636 W HCPCS: Performed by: PHYSICIAN ASSISTANT

## 2017-02-08 RX ORDER — ATORVASTATIN CALCIUM 10 MG/1
10 TABLET, FILM COATED ORAL NIGHTLY
Qty: 30 TABLET | Refills: 0 | Status: SHIPPED | OUTPATIENT
Start: 2017-02-08 | End: 2017-03-29 | Stop reason: SDUPTHER

## 2017-02-08 RX ORDER — ASPIRIN 81 MG/1
81 TABLET ORAL DAILY
Refills: 0 | COMMUNITY
Start: 2017-02-08

## 2017-02-08 RX ORDER — POTASSIUM CHLORIDE 1500 MG/1
20 TABLET, EXTENDED RELEASE ORAL DAILY
Qty: 30 TABLET | Refills: 0 | Status: SHIPPED | OUTPATIENT
Start: 2017-02-08 | End: 2017-02-16

## 2017-02-08 RX ADMIN — POTASSIUM CHLORIDE 10 MEQ: 10 TABLET, EXTENDED RELEASE ORAL at 09:02

## 2017-02-08 RX ADMIN — PANTOPRAZOLE SODIUM 40 MG: 40 TABLET, DELAYED RELEASE ORAL at 09:02

## 2017-02-08 RX ADMIN — PSYLLIUM HUSK 1 PACKET: 3.4 POWDER ORAL at 09:02

## 2017-02-08 RX ADMIN — FLUTICASONE FUROATE AND VILANTEROL TRIFENATATE 1 PUFF: 100; 25 POWDER RESPIRATORY (INHALATION) at 08:02

## 2017-02-08 RX ADMIN — ASPIRIN 81 MG: 81 TABLET, COATED ORAL at 09:02

## 2017-02-08 RX ADMIN — FUROSEMIDE 20 MG: 20 TABLET ORAL at 09:02

## 2017-02-08 RX ADMIN — PROPRANOLOL HYDROCHLORIDE 20 MG: 20 TABLET ORAL at 09:02

## 2017-02-08 NOTE — PROGRESS NOTES
Met with patient @ bedside and she does want HH. [facility] as [facility]. Patient choice disclosure form signed. Patient does not have agency preference. Dr. Bolanos aware and will write orders for HH with SN,PT and OT evals.

## 2017-02-08 NOTE — TELEPHONE ENCOUNTER
----- Message from Ellen Villatoro sent at 2/8/2017  2:52 PM CST -----  Contact: ranjan   Foot injury, wants to re bandage  Call back

## 2017-02-08 NOTE — DISCHARGE SUMMARY
[facility] Hospital Medicine  Discharge Summary      Patient Name: Jennifer Diaz  MRN: [id number]  Admission Date: 2/6/2017  Hospital Length of Stay: 1 days  Discharge Date and Time:  02/08/2017 4:04 PM  Attending Physician: No att. providers found   Discharging Provider: Sylvia Bolanos MD  Primary Care Provider: Catrachito Mart MD      HPI:   Pt says she is here because she fell down at home last week and her family is worried she had a stroke. She states she only lost her balance. She denies LOC but says she sprained her R ankle at the time. She denies any significant medical problems besides asthma. She lives alone and is able to do her activities of daily living. She denies any current weakness, CP or SOB. She does admit she has been having swelling in her legs recently.         Indwelling Lines/Drains at time of discharge:   Lines/Drains/Airways          No matching active lines, drains, or airways        Hospital Course:   Pt sent for MRI, echo and carotid u/s to assess an etiology for her recent falls/ weakness. Results were insignificant. Labs were significant for pre diabetes, A1C 5.8. Patient examined at bedside on day of discharge. Exam findings within normal limits. She was deemed safe for discharge.         Consults:   Consults         Status Ordering Provider     Inpatient consult to Social Work/Case Management  Once     Provider:  (Not yet assigned)    AKASH Harris          Significant Diagnostic Studies: Labs:   CMP   Recent Labs  Lab 02/06/17 2139 02/07/17  0515   * 135*   K 4.4 4.3   CL 95 94*   CO2 32* 33*   * 96   BUN 16 16   CREATININE 0.6 0.6   CALCIUM 9.8 9.5   PROT 7.0 6.6   ALBUMIN 3.5 3.3*   BILITOT 0.5 0.4   ALKPHOS 65 60   AST 38 35   ALT 20 19   ANIONGAP 8 8   ESTGFRAFRICA >60 >60   EGFRNONAA >60 >60   , CBC   Recent Labs  Lab 02/06/17 2139 02/07/17  0515   WBC 5.80 6.30   HGB 12.9 13.0   HCT 40.8 40.8    214   , Troponin    Recent Labs  Lab 02/06/17 2139   TROPONINI 0.008    and A1C: 5.8      Radiology: MRI:   Impression       Mild involutional changes and findings consistent with microvascular ischemic change         CT scan: head    IMPRESSION    Mild involutional changes and findings consistent with microvascular ischemic change with no acute intracranial findings.    Ultrasound: carotid u/s  Impression     1.  50-69% stenosis of the left ICA.  2.  Less than 50% narrowing of the right ICA.         Cardiac Graphics: Echocardiogram:   2D echo with color flow doppler:   Results for orders placed or performed during the hospital encounter of 02/06/17   2D echo with color flow doppler   Result Value Ref Range    EF 57 55 - 65    Diastolic Dysfunction No     Est. PA Systolic Pressure 32.16     Pericardial Effusion NONE     Tricuspid Valve Regurgitation TRIVIAL        Pending Diagnostic Studies:     None        Final Active Diagnoses:    Diagnosis Date Noted POA    PRINCIPAL PROBLEM:  Right leg weakness [R29.898] 02/06/2017 Yes    Hyperglycemia [R73.9] 02/07/2017 Yes    Moderate persistent asthma without complication [J45.40] 03/15/2016 Yes    Intention tremor [G25.2] 03/15/2016 Yes      Problems Resolved During this Admission:    Diagnosis Date Noted Date Resolved POA      * Right leg weakness  Home PT rehabilitation.      Intention tremor  Continue home propranolol.    Moderate persistent asthma without complication  Continue nebs and home inhalers.       Hyperglycemia  Prediabetes. A1C 5.8. Pt counseled on proper dietary changes.         Discharged Condition: good    Disposition: Home or Self Care    Follow Up:    Patient Instructions:     Ambulatory referral to Home Health   Referral Priority: Routine Referral Type: Home Health   Referral Reason: Specialty Services Required    Requested Specialty: Home Health Services    Number of Visits Requested: 1      Diet general     Activity as tolerated       Medications:  Reconciled Home  Medications:   Discharge Medication List as of 2/8/2017 12:26 PM      START taking these medications    Details   atorvastatin (LIPITOR) 10 MG tablet Take 1 tablet (10 mg total) by mouth every evening., Starting 2/8/2017, Until Discontinued, Normal         CONTINUE these medications which have CHANGED    Details   aspirin (ECOTRIN) 81 MG EC tablet Take 1 tablet (81 mg total) by mouth once daily., Starting 2/8/2017, Until Discontinued, OTC      potassium chloride (K-TAB) 20 mEq Take 1 tablet (20 mEq total) by mouth once daily., Starting 2/8/2017, Until Discontinued, Normal         CONTINUE these medications which have NOT CHANGED    Details   albuterol (PROAIR HFA) 90 mcg/actuation inhaler Inhale 2 puffs into the lungs every 6 (six) hours as needed., Starting 7/27/2016, Until Discontinued, Normal      albuterol-ipratropium 2.5mg-0.5mg/3mL (DUO-NEB) 0.5 mg-3 mg(2.5 mg base)/3 mL nebulizer solution Take 3 mLs by nebulization every 6 (six) hours as needed for Wheezing., Starting 5/4/2016, Until Discontinued, Normal      budesonide-formoterol 80-4.5 mcg (SYMBICORT) 80-4.5 mcg/actuation HFAA Inhale 2 puffs into the lungs once daily., Starting 7/27/2016, Until Discontinued, Normal      clonazePAM (KLONOPIN) 0.5 MG tablet 0.25 mg po bid, Normal      fluticasone (FLONASE) 50 mcg/actuation nasal spray 2 sprays by Each Nare route once daily., Starting 11/3/2016, Until Discontinued, Normal      furosemide (LASIX) 20 MG tablet Take 1 tablet (20 mg total) by mouth 2 (two) times daily., Starting 2/6/2017, Until Tue 2/6/18, Normal      polyethylene glycol (GLYCOLAX) 17 gram/dose powder Take 17 g by mouth 2 (two) times daily as needed (Constipation)., Starting 12/14/2016, Until Discontinued, Print      propranolol (INDERAL) 20 MG tablet Take 1 tablet (20 mg total) by mouth 2 (two) times daily. For tremors, Starting 11/3/2016, Until Fri 11/3/17, Normal      trospium (SANCTURA) 20 mg Tab tablet Take 20 mg by mouth 2 (two) times  daily., Until Discontinued, Historical Med           Time spent on the discharge of patient: 44 minutes    Sylvia Bolanos MD  Department of Hospital Medicine  [facility]

## 2017-02-08 NOTE — PLAN OF CARE
Problem: Patient Care Overview  Goal: Plan of Care Review  Outcome: Ongoing (interventions implemented as appropriate)  Pt vital signs stable at this time. Lt AC IV SL. Pt denies pain at this time. Pt anxious and demanding at times. Pt ambulates unsteady,  uses walker at bedside. Call light within pt reach, pt instructed to call staff for any needed assistance, safety maintained, bed alarm on. Pt denies needs/concerns at this time, will continue to monitor.

## 2017-02-08 NOTE — PLAN OF CARE
Problem: Patient Care Overview  Goal: Plan of Care Review  Outcome: Ongoing (interventions implemented as appropriate)  Pt aao x4. Pt able to verbalize needs/pain/wants. Pt poc reviewed with pt and pt stated understanding. Pt denies pain at this time. Pt has been ambulating from chair to BSC. Pt safety maintained thus far this shift. Pt is in chair with call light within reach and will continue to monitor during my shift.

## 2017-02-08 NOTE — PT/OT/SLP DISCHARGE
Physical Therapy Discharge Summary    Jennifer Diaz  MRN: [id number]   Right leg weakness   Patient Discharged from acute Physical Therapy on 2017.  Please refer to prior PT noted date on 2017 for functional status.     Assessment:   Patient appropriate for care in another setting.  GOALS:   Physical Therapy Goals     Not on file      Multidisciplinary Problems (Resolved)        Problem: Physical Therapy Goal    Goal Priority Disciplines Outcome Goal Variances Interventions   Physical Therapy Goal   (Resolved)     PT/OT, PT Outcome(s) achieved     Description:  Goals to be met by: 2017     Patient will increase functional independence with mobility by performin. Supine to sit with Modified [place]  2. Sit to supine with Modified [place]  3. Sit to stand transfer with Modified [place]  4. Bed to chair transfer with Modified [place] using Rolling Walker  5. Gait  x 50 feet with Stand-by Assistance using Rolling Walker.               Reasons for Discontinuation of Therapy Services  Transfer to alternate level of care.      Plan:  Patient Discharged to: Home with Home Health Service.

## 2017-02-08 NOTE — NURSING
Pt discharge instructions given to pt and pt daughter. Both stated understanding. F/u appts encouraged. piv removed, vss, and all pt meds returned to pt. Pt has no c/o pain or sob at this time. Pt being discharged to home with home health. Pt discharge instructions sent home with pt. Pt escorted off floor by [facility] transport attendant.

## 2017-02-08 NOTE — PT/OT/SLP PROGRESS
Physical Therapy  Treatment    Jennifer iDaz   MRN: [id number]   Admitting Diagnosis: Right leg weakness    PT Received On: 17  PT Start Time: 900     PT Stop Time: 924    PT Total Time (min): 24 min       Billable Minutes:  Gait [id number] and Therapeutic Exercise 14    Treatment Type: Treatment  PT/PTA: PT             General Precautions: Standard, fall  Orthopedic Precautions: N/A   Braces:           Subjective:  Communicated with nurseBrandie prior to session.      Pain Ratin/10              Pain Rating Post-Intervention: 0/10    Objective:        Functional Mobility:  Bed Mobility:        Transfers:  Sit <> Stand Assistance: Contact Guard Assistance (with vc for hand placement)  Sit <> Stand Assistive Device: Rolling Walker    Gait:   Gait Distance: 12'  Assistance 1: Contact Guard Assistance  Gait Assistive Device: Rolling walker  Gait Deviation(s): decreased deya, decreased velocity of limb motion, decreased step length, decreased weight-shifting ability, decreased toe-to-floor clearance        Therapeutic Activities and Exercises:  AP, LAQ, hip flex/abd/add x15 reps       Patient left up in chair with all lines intact and call button in reach.    Assessment:  Jennifer Diaz is a 84 y.o. female with a medical diagnosis of Right leg weakness . Decreased gait distance compared to previous session. Pt reports being fearful of falling.    Rehab identified problem list/impairments: Rehab identified problem list/impairments: weakness, impaired endurance, impaired self care skills, impaired functional mobilty, gait instability, impaired balance, impaired skin, decreased safety awareness, edema    Rehab potential is fair.    Activity tolerance: Fair    Discharge recommendations: Discharge Facility/Level Of Care Needs: nursing facility, skilled, assisted living facility     Barriers to discharge: Barriers to Discharge: Decreased caregiver support    Equipment recommendations: Equipment  Needed After Discharge: walker, rolling     GOALS:   Physical Therapy Goals        Problem: Physical Therapy Goal    Goal Priority Disciplines Outcome Goal Variances Interventions   Physical Therapy Goal     PT/OT, PT      Description:  Goals to be met by: 2017     Patient will increase functional independence with mobility by performin. Supine to sit with Modified [place]  2. Sit to supine with Modified [place]  3. Sit to stand transfer with Modified [place]  4. Bed to chair transfer with Modified [place] using Rolling Walker  5. Gait  x 50 feet with Stand-by Assistance using Rolling Walker.                 PLAN:    Patient to be seen 6 x/week  to address the above listed problems via gait training, therapeutic activities, therapeutic exercises  Plan of Care expires: 17  Plan of Care reviewed with: patient         Oralia Araya, PT  2017

## 2017-02-08 NOTE — TELEPHONE ENCOUNTER
Pt asked if she can take off the bandage wrapping that dr moreno put on the other day.  I told her yes and to change it out and get same materials from the pharmacy to rewrap it.  She verbalized full understanding. Offered pt appt for hospital f/u and she states it will have to be 2 weeks from now since she [place] [facility].  i encouraged her to call us back asap if she needs anything. Pt verbalized full understanding.

## 2017-02-08 NOTE — DISCHARGE INSTRUCTIONS
Thank you for choosing [facility] for your medical care. The primary doctor who is taking care of you at the time of your discharge is Sylvia Bolanos MD.     You were admitted to the hospital with Right leg weakness.     Please note your discharge instructions, including diet/activity restrictions, follow-up appointments, and medication changes.  If you have any questions about your medical issues, prescriptions, or any other questions, please feel free to contact the [facility] Medicine Dept at [phone] and we will help.    If you are previously with Home health, outpatient PT/OT or under a therapy program, you are cleared to return to those programs.    Please direct all long term medication refills and follow up to your primary care provider, Catrachito Mart MD. Thank you again for letting us take care of your health care needs.    Please note the following discharge instructions per your discharging physician-  Take all meds as prescribed. Follow up with podiatry and dermatology.

## 2017-02-08 NOTE — PLAN OF CARE
Problem: Physical Therapy Goal  Goal: Physical Therapy Goal  Goals to be met by: 2017     Patient will increase functional independence with mobility by performin. Supine to sit with Modified [place]  2. Sit to supine with Modified [place]  3. Sit to stand transfer with Modified [place]  4. Bed to chair transfer with Modified [place] using Rolling Walker  5. Gait x 50 feet with Stand-by Assistance using Rolling Walker.    Ambulated 12' for PT

## 2017-02-08 NOTE — PLAN OF CARE
02/08/17 0828   Patient Assessment/Suction   Level of Consciousness (AVPU) alert   All Lung Fields Breath Sounds clear   PRE-TX-O2-ETCO2   O2 Device (Oxygen Therapy) room air   SpO2 (!) 93 %   Pulse Oximetry Type Intermittent   $ Pulse Oximetry - Multiple Charge Pulse Oximetry - Multiple   Pulse 79   Resp 16   Aerosol Therapy   $ Aerosol Therapy Charges PRN treatment not required   Inhaler   $ Inhaler Charges MDI (Metered Dose Inahler) Treatment;Mouth rinsed post treatment   Respiratory Treatment Status given   SVN/Inhaler Treatment Route mouthpiece   Patient Tolerance good   Post-Treatment   Post-treatment Heart Rate (beats/min) 81   Post-treatment Resp Rate (breaths/min) 16   All Fields Breath Sounds unchanged   Pt assessed for PRN neb tx, none needed at this time. Breo 1 puff Qday, mouth rinsed after DPI.

## 2017-02-09 ENCOUNTER — TELEPHONE (OUTPATIENT)
Dept: FAMILY MEDICINE | Facility: CLINIC | Age: 82
End: 2017-02-09

## 2017-02-09 NOTE — TELEPHONE ENCOUNTER
----- Message from Thaddeus MORRIS Kyler sent at 2/9/2017  2:31 PM CST -----  Contact: [facility]/Ebony Beck called in and requested a message be sent regarding patient being discharged with a diagnoses of weakness in right lower extremity.  Ebony stated that she could send [facility] NP to do an assessment since patient has limited mobility to get into office.    Ebony just wanted to see if that would be OK with Dr. Mart.  Ebony's call back number is [phone] [facility] 4307

## 2017-02-09 NOTE — PLAN OF CARE
02/09/17 1353   Final Note   Assessment Type Final Discharge Note   Discharge Disposition Home-Health   Discharge planning education complete? Yes

## 2017-02-10 ENCOUNTER — TELEPHONE (OUTPATIENT)
Dept: FAMILY MEDICINE | Facility: CLINIC | Age: 82
End: 2017-02-10

## 2017-02-10 NOTE — TELEPHONE ENCOUNTER
----- Message from Lola Leger sent at 2/10/2017  4:18 PM CST -----  Contact: Self  Patient wants to speak with a nurse regarding medication changes.  Please call [phone]  Thank you

## 2017-02-13 ENCOUNTER — TELEPHONE (OUTPATIENT)
Dept: FAMILY MEDICINE | Facility: CLINIC | Age: 82
End: 2017-02-13

## 2017-02-13 NOTE — TELEPHONE ENCOUNTER
----- Message from Diogo Johnston sent at 2/9/2017  8:36 AM CST -----  Contact: Patient  Patient called requesting a same day appointment. I offred the earliest appointment. patient stated that she she need to get in about 9:00am today due to transportation.Please call back at [phone] thanks,

## 2017-02-15 ENCOUNTER — TELEPHONE (OUTPATIENT)
Dept: FAMILY MEDICINE | Facility: CLINIC | Age: 82
End: 2017-02-15

## 2017-02-15 NOTE — TELEPHONE ENCOUNTER
----- Message from Alfredo Lassiter sent at 2/15/2017  8:12 AM CST -----  Contact: [facility]  Calling again to discuss care for her home health. States she left message since Thursday. Says she will call back around 3 pm today. Please call back [phone]. Thank you!!!

## 2017-02-15 NOTE — TELEPHONE ENCOUNTER
Spoke to amador with home health.  She states pt lives alone.  Her daughters live far away out of state.  She has one son that lives sort of close and offered one day a week to do groceries and a granddaughter that offered one day a week to do shower.  One of the daughters offered $200 a month for a sitter and that's it.  Family refused aide. Nurse has encouraged assisted living. Pt is having problems with getting to f/u hospital appts due to transportation bus needing 2 weeks notice for any doctors appts.  Pt stated to nurse that she is prediabetic.  Also, pt was dc'd with a ace bandage on her right foot for a sprain.      Nurse wants to know what nikhil would like to do.  Nurse has offered all the resources that she has.      Can blood work be ordered?    Right foot sprain care be ordered?  If patient is diabetic, can nurse be informed so that they can do diabetic teaching?      Please advise.

## 2017-02-16 ENCOUNTER — TELEPHONE (OUTPATIENT)
Dept: FAMILY MEDICINE | Facility: CLINIC | Age: 82
End: 2017-02-16

## 2017-02-16 RX ORDER — POTASSIUM CHLORIDE 1.5 G/1.58G
20 POWDER, FOR SOLUTION ORAL DAILY
Qty: 30 PACKET | Refills: 11 | Status: SHIPPED | OUTPATIENT
Start: 2017-02-16 | End: 2018-05-29

## 2017-02-16 RX ORDER — POLYETHYLENE GLYCOL 3350 17 G/17G
17 POWDER, FOR SOLUTION ORAL 2 TIMES DAILY PRN
Qty: 289 G | Refills: 0 | Status: SHIPPED | OUTPATIENT
Start: 2017-02-16 | End: 2017-05-26 | Stop reason: SDUPTHER

## 2017-02-16 NOTE — TELEPHONE ENCOUNTER
----- Message from Lauryn Edwards sent at 2/16/2017 12:08 PM CST -----  Requesting information on refill Glycol / pharmacy sent a request ... Pt states she has been out for a few weeks .. Asking to have potassium pills (too large to swallow) asking to have  changed to the powder / please call pt at   [phone]    [facility] [address] [place] [address] NAA R[address]RIGUEZ  [address] AT [facility]  [address]  [place] [address]  Phone: [phone] Fax: [phone]

## 2017-02-17 ENCOUNTER — TELEPHONE (OUTPATIENT)
Dept: FAMILY MEDICINE | Facility: CLINIC | Age: 82
End: 2017-02-17

## 2017-02-17 NOTE — TELEPHONE ENCOUNTER
----- Message from RT Joy sent at 2/16/2017  4:24 PM CST -----  Contact: [phone]  Called pod, [phone], requesting to check the status of her medication refills, please call soon, thanks.

## 2017-02-17 NOTE — TELEPHONE ENCOUNTER
They can do their standard ankle sprain treatment which involved applying an Ace bandage and having the patient draw the alphabet with her foot hole in her legs still.    They can do diabetic education

## 2017-02-20 ENCOUNTER — TELEPHONE (OUTPATIENT)
Dept: HEPATOLOGY | Facility: HOSPITAL | Age: 82
End: 2017-02-20

## 2017-02-21 ENCOUNTER — HOSPITAL ENCOUNTER (EMERGENCY)
Facility: HOSPITAL | Age: 82
Discharge: HOME | End: 2017-02-21
Attending: EMERGENCY MEDICINE
Payer: MEDICARE

## 2017-02-21 VITALS
HEIGHT: 62 IN | SYSTOLIC BLOOD PRESSURE: 106 MMHG | HEART RATE: 90 BPM | BODY MASS INDEX: 30.18 KG/M2 | OXYGEN SATURATION: 97 % | WEIGHT: 164 LBS | DIASTOLIC BLOOD PRESSURE: 68 MMHG | RESPIRATION RATE: 18 BRPM | TEMPERATURE: 99 F

## 2017-02-21 DIAGNOSIS — R53.83 FATIGUE, UNSPECIFIED TYPE: Primary | ICD-10-CM

## 2017-02-21 LAB
ALBUMIN SERPL BCP-MCNC: 3.4 G/DL
ALP SERPL-CCNC: 64 U/L
ALT SERPL W/O P-5'-P-CCNC: 19 U/L
ANION GAP SERPL CALC-SCNC: 12 MMOL/L
AST SERPL-CCNC: 37 U/L
BASOPHILS # BLD AUTO: 0 K/UL
BASOPHILS NFR BLD: 0 %
BILIRUB SERPL-MCNC: 0.7 MG/DL
BILIRUB UR QL STRIP: ABNORMAL
BUN SERPL-MCNC: 19 MG/DL
CALCIUM SERPL-MCNC: 9.9 MG/DL
CHLORIDE SERPL-SCNC: 93 MMOL/L
CLARITY UR: CLEAR
CO2 SERPL-SCNC: 32 MMOL/L
COLOR UR: YELLOW
CREAT SERPL-MCNC: 0.6 MG/DL
DIFFERENTIAL METHOD: ABNORMAL
EOSINOPHIL # BLD AUTO: 0.1 K/UL
EOSINOPHIL NFR BLD: 1.5 %
ERYTHROCYTE [DISTWIDTH] IN BLOOD BY AUTOMATED COUNT: 13.9 %
EST. GFR  (AFRICAN AMERICAN): >60 ML/MIN/1.73 M^2
EST. GFR  (NON AFRICAN AMERICAN): >60 ML/MIN/1.73 M^2
GLUCOSE SERPL-MCNC: 84 MG/DL
GLUCOSE UR QL STRIP: NEGATIVE
HCT VFR BLD AUTO: 39.8 %
HGB BLD-MCNC: 13.1 G/DL
HGB UR QL STRIP: NEGATIVE
KETONES UR QL STRIP: ABNORMAL
LEUKOCYTE ESTERASE UR QL STRIP: NEGATIVE
LYMPHOCYTES # BLD AUTO: 0.7 K/UL
LYMPHOCYTES NFR BLD: 12.5 %
MCH RBC QN AUTO: 27.7 PG
MCHC RBC AUTO-ENTMCNC: 32.9 %
MCV RBC AUTO: 84 FL
MONOCYTES # BLD AUTO: 0.4 K/UL
MONOCYTES NFR BLD: 7.5 %
NEUTROPHILS # BLD AUTO: 4.6 K/UL
NEUTROPHILS NFR BLD: 78.5 %
NITRITE UR QL STRIP: NEGATIVE
PH UR STRIP: 6 [PH] (ref 5–8)
PLATELET # BLD AUTO: 234 K/UL
PMV BLD AUTO: 8.2 FL
POTASSIUM SERPL-SCNC: 3.8 MMOL/L
PROT SERPL-MCNC: 7.2 G/DL
PROT UR QL STRIP: NEGATIVE
RBC # BLD AUTO: 4.73 M/UL
SODIUM SERPL-SCNC: 137 MMOL/L
SP GR UR STRIP: 1.02 (ref 1–1.03)
TROPONIN I SERPL DL<=0.01 NG/ML-MCNC: <0.006 NG/ML
TSH SERPL DL<=0.005 MIU/L-ACNC: 3.3 UIU/ML
URN SPEC COLLECT METH UR: ABNORMAL
UROBILINOGEN UR STRIP-ACNC: NEGATIVE EU/DL
WBC # BLD AUTO: 5.8 K/UL

## 2017-02-21 PROCEDURE — 81003 URINALYSIS AUTO W/O SCOPE: CPT

## 2017-02-21 PROCEDURE — 99284 EMERGENCY DEPT VISIT MOD MDM: CPT

## 2017-02-21 PROCEDURE — 80053 COMPREHEN METABOLIC PANEL: CPT

## 2017-02-21 PROCEDURE — 85025 COMPLETE CBC W/AUTO DIFF WBC: CPT

## 2017-02-21 PROCEDURE — 25000003 PHARM REV CODE 250: Performed by: EMERGENCY MEDICINE

## 2017-02-21 PROCEDURE — 84484 ASSAY OF TROPONIN QUANT: CPT

## 2017-02-21 PROCEDURE — 93010 ELECTROCARDIOGRAM REPORT: CPT | Mod: ,,, | Performed by: INTERNAL MEDICINE

## 2017-02-21 PROCEDURE — 84443 ASSAY THYROID STIM HORMONE: CPT

## 2017-02-21 PROCEDURE — 93005 ELECTROCARDIOGRAM TRACING: CPT

## 2017-02-21 PROCEDURE — 36415 COLL VENOUS BLD VENIPUNCTURE: CPT

## 2017-02-21 RX ORDER — POLYETHYLENE GLYCOL 3350 17 G/17G
17 POWDER, FOR SOLUTION ORAL ONCE
Status: COMPLETED | OUTPATIENT
Start: 2017-02-21 | End: 2017-02-21

## 2017-02-21 RX ADMIN — POLYETHYLENE GLYCOL (3350) 17 G: 17 POWDER, FOR SOLUTION ORAL at 02:02

## 2017-02-21 NOTE — DISCHARGE INSTRUCTIONS
Weakness (Uncertain Cause)  Based on your exam today, the exact cause of your weakness is not certain. However, your weakness does not seem to be a sign of a serious illness at this time. Keep an eye on your symptoms and get medical advice as instructed below.  Home care  · Rest at home today. Do not over-exert yourself.  · Take any medicine as prescribed.  · For the next few days, drink extra fluids (unless your healthcare provider wants you to restrict fluids for other reasons). Do not skip meals.  Follow-up care  Follow up with your healthcare provider or as advised.  When to seek medical advice  Call your healthcare provider for any of the following  · Worsening of your symptoms  · Symptoms don't start getting better within 2 days  · Fever of 100.4º F (38º C) or higher, or as directed by your healthcare provider·    Call 911  Get emergency medical care for any of these:  · Chest, arm, neck, jaw or upper back pain  · Trouble breathing  · Numbness or weakness of the face, one arm or one leg  · Slurred speech, confusion, trouble speaking, walking or seeing  · Blood in vomit or stool (black or red color)  · Loss of consciousness  Date Last Reviewed: 6/10/2015  © 3734-3494 [employer]. [address] [place] [address], [place] [address]. All rights reserved. This information is not intended as a substitute for professional medical care. Always follow your healthcare professional's instructions.

## 2017-02-21 NOTE — ED AVS SNAPSHOT
[facility] [address] [place] [address]               Jennifer Diaz   2017 12:52 AM   ED    Description:  Female : 1932   Department:  [facility]           Your Care was Coordinated By:     Provider Role From To    Davis Glaser MD Attending Provider 17 0052 --      Reason for Visit     Fatigue           Diagnoses this Visit        Comments    Fatigue, unspecified type    -  Primary       ED Disposition     None           To Do List           Follow-up Information     Schedule an appointment as soon as possible for a visit with Catrachito Mart MD.    Specialties:  Family Medicine, Internal Medicine    Contact information:    [address]  [facility] [address]  [phone]          Follow up with [facility].    Specialty:  Emergency Medicine    Why:  If symptoms worsen    Contact information:    [address] [place] [id number] [phone]      [facility]     [facility] Care [facility] -  Assistance  Registered nurses in the [facility] provide clinical advisement, health education, appointment booking, and other advisory services.  Call for this free service at [phone].             Medications           Message regarding Medications     Verify the changes and/or additions to your medication regime listed below are the same as discussed with your clinician today.  If any of these changes or additions are incorrect, please notify your healthcare provider.        These medications were administered today        Dose Freq    polyethylene glycol packet 17 g 17 g Once    Sig: Take 17 g by mouth once.    Class: Normal    Route: Oral           Verify that the below list of medications is an accurate representation of the medications you are currently taking.  If none reported, the list may be blank. If incorrect, please contact your healthcare  "provider. Carry this list with you in case of emergency.           Current Medications     albuterol (PROAIR HFA) 90 mcg/actuation inhaler Inhale 2 puffs into the lungs every 6 (six) hours as needed.    albuterol-ipratropium 2.5mg-0.5mg/3mL (DUO-NEB) 0.5 mg-3 mg(2.5 mg base)/3 mL nebulizer solution Take 3 mLs by nebulization every 6 (six) hours as needed for Wheezing.    aspirin (ECOTRIN) 81 MG EC tablet Take 1 tablet (81 mg total) by mouth once daily.    atorvastatin (LIPITOR) 10 MG tablet Take 1 tablet (10 mg total) by mouth every evening.    budesonide-formoterol 80-4.5 mcg (SYMBICORT) 80-4.5 mcg/actuation HFAA Inhale 2 puffs into the lungs once daily.    clonazePAM (KLONOPIN) 0.5 MG tablet 0.25 mg po bid    fluticasone (FLONASE) 50 mcg/actuation nasal spray 2 sprays by Each Nare route once daily.    furosemide (LASIX) 20 MG tablet Take 1 tablet (20 mg total) by mouth 2 (two) times daily.    polyethylene glycol (GLYCOLAX) 17 gram/dose powder Take 17 g by mouth 2 (two) times daily as needed (Constipation).    polyethylene glycol packet 17 g Take 17 g by mouth once.    potassium chloride (KLOR-CON) 20 mEq Pack Take 20 mEq by mouth once daily.    propranolol (INDERAL) 20 MG tablet Take 1 tablet (20 mg total) by mouth 2 (two) times daily. For tremors    trospium (SANCTURA) 20 mg Tab tablet Take 20 mg by mouth 2 (two) times daily.           Clinical Reference Information           Your Vitals Were     BP Pulse Resp Height Weight Last Period    110/62 84 18 5' 2" (1.575 m) 74.4 kg (164 lb) (LMP Unknown)    SpO2 BMI             97% 30 kg/m2         Allergies as of 2/21/2017        Reactions    Epinephrine     Seizure like activity    Pcn [Penicillins]       Immunizations Administered on Date of Encounter - 2/21/2017     None      ED Micro, Lab, POCT     Start Ordered       Status Ordering Provider    02/21/17 0054 02/21/17 0054  CBC auto differential  STAT      Final result     02/21/17 0054 02/21/17 0054  Comprehensive " metabolic panel  STAT      Final result     02/21/17 0054 02/21/17 0054  Urinalysis - Cath.  Once      Final result     02/21/17 0054 02/21/17 0054  TSH  Once      Final result     02/21/17 0054 02/21/17 0054  Troponin I  STAT      Final result       ED Imaging Orders     Start Ordered       Status Ordering Provider    02/21/17 0054 02/21/17 0054  X-Ray Chest AP Portable  1 time imaging      In process         Discharge Instructions         Weakness (Uncertain Cause)  Based on your exam today, the exact cause of your weakness is not certain. However, your weakness does not seem to be a sign of a serious illness at this time. Keep an eye on your symptoms and get medical advice as instructed below.  Home care  · Rest at home today. Do not over-exert yourself.  · Take any medicine as prescribed.  · For the next few days, drink extra fluids (unless your healthcare provider wants you to restrict fluids for other reasons). Do not skip meals.  Follow-up care  Follow up with your healthcare provider or as advised.  When to seek medical advice  Call your healthcare provider for any of the following  · Worsening of your symptoms  · Symptoms don't start getting better within 2 days  · Fever of 100.4º F (38º C) or higher, or as directed by your healthcare provider·    Call 911  Get emergency medical care for any of these:  · Chest, arm, neck, jaw or upper back pain  · Trouble breathing  · Numbness or weakness of the face, one arm or one leg  · Slurred speech, confusion, trouble speaking, walking or seeing  · Blood in vomit or stool (black or red color)  · Loss of consciousness  Date Last Reviewed: 6/10/2015  © 6512-0549 [employer]. [address] [place] [address]. All rights reserved. This information is not intended as a substitute for professional medical care. Always follow your healthcare professional's instructions.          Your Scheduled Appointments     Feb 22, 2017 10:15 AM CST   Established  Patient Visit with TAYLOR Cramer - Podiatry ([place])    [address]  [place] [id number] [phone]            Feb 27, 2017  2:00 PM CST   Bone Density with SLIC DEXA1   [facility]    [address]  [place] [address]   [phone]            Mar 01, 2017  1:20 PM CST   Established Patient Visit with Catrachito Mart MD   [address] [address]   [phone]            Mar 06, 2017  2:40 PM CST   Established Patient Visit with ALF Dugan   [address] [address]   [phone]               [facility] complies with applicable Federal civil rights laws and does not discriminate on the basis of race, color, national origin, age, disability, or sex.        Language Assistance Services     ATTENTION: Language assistance services are available, free of charge. Please call [phone].      ATENCIÓN: Si habla español, tiene a barrientos disposición servicios gratuitos de asistencia lingüística. Llame al [phone].     CHÚ Ý: N?u b?n nói Ti?ng Vi?t, có các d?ch v? h? tr? ngôn ng? mi?n phí dành cho b?n. G?i s? [phone].

## 2017-02-21 NOTE — TELEPHONE ENCOUNTER
Pt contacted on call physician as she was experiencing generalized fatigue. She was concerned that she was having an allergic reaction to meds that she started recently. Pt denied CP/SOB. She was informed that she should discontinue her recently added meds. Since geriatric, informed pt that she will need to contact 911 (since she also lives alone). Pt acknowledged understanding of this an informed me that she would call 911 after our conversation ended.       Portions of this note were created using Dragon voice recognition software. There may be voice recognition errors found in the text, and attempts were made to correct these errors prior to signature    Gilberto Purcell MD    Family Medicine  2/20/2017

## 2017-02-21 NOTE — ED PROVIDER NOTES
Encounter Date: 2/21/2017    SCRIBE #1 NOTE: Agnieszka ZAPATA, yamileth scribing for, and in the presence of, Dr. Glaser.       History     Chief Complaint   Patient presents with    Fatigue     started yesterday. Decreased sleep and appetite     Review of patient's allergies indicates:   Allergen Reactions    Epinephrine      Seizure like activity      Pcn [penicillins]      HPI Comments: 02/21/2017  12:50 AM     Chief Complaint: Fatigue      The patient is a 84 y.o. female with a PMHx of acid reflux; family history of breast cancer; family history of DM; intrinsic asthma; and psoriasis who is presenting via EMS with an acute onset of fatigue for the past 1 day. Associated symptoms of no appetite, dark urine, and weakness. Pt also c/o a sleep disturbance for the past 3 days. She reported that she did not have the energy today to make her microwaveable meal.  Per EMS, pt had sinus rhythm and O2 sats at 90%. EMS also reported that the pt c/o palpitations, which resolved after the pt gave herself an albuterol treatment at home. Pt also reported that Dr. Mart has been treating her chronic incontinence for about 1 yr. No CP. No abd pain. Pt walks with a walker. Pt has a past surgical history that includes Total abdominal hysterectomy w/ bilateral salpingoophorectomy; Tonsillectomy, Adenoidectomy; Dilation and curettage of uterus; and Umbilical hernia repair.      The history is provided by the patient and the EMS personnel.     Past Medical History   Diagnosis Date    Acid reflux     Family history of breast cancer     Family history of diabetes mellitus     Intrinsic asthma     Psoriasis      No past medical history pertinent negatives.  Past Surgical History   Procedure Laterality Date    Total abdominal hysterectomy w/ bilateral salpingoophorectomy  6/2012    Tonsillectomy, adenoidectomy  as child    Dilation and curettage of uterus       x2    Umbilical hernia repair       Family History   Problem Relation  Age of Onset    Cancer Mother      pancreas    Heart attack Father     Cancer Sister      breast    Diabetes Brother      Social History   Substance Use Topics    Smoking status: Never Smoker    Smokeless tobacco: Never Used    Alcohol use No     Review of Systems   Constitutional: Positive for appetite change (No appetite.) and fatigue. Negative for fever.   HENT: Negative for sore throat.    Respiratory: Negative for shortness of breath.    Cardiovascular: Positive for palpitations (resolved.). Negative for chest pain.   Gastrointestinal: Positive for constipation. Negative for abdominal pain.   Genitourinary:        +Dark urine.   Musculoskeletal: Negative for back pain.   Skin: Negative for rash.   Neurological: Positive for weakness.        +Chronic incontinence.   Hematological: Does not bruise/bleed easily.   Psychiatric/Behavioral: Positive for sleep disturbance. Negative for confusion.   All other systems reviewed and are negative.      Physical Exam   Initial Vitals   BP Pulse Resp Temp SpO2   02/21/17 0031 02/21/17 0031 02/21/17 0031 -- 02/21/17 0031   110/62 84 18  97 %     Physical Exam    Nursing note and vitals reviewed.  Constitutional: She appears well-developed and well-nourished. She is not diaphoretic.  Non-toxic appearance. She does not have a sickly appearance. She does not appear ill. No distress.   HENT:   Head: Normocephalic and atraumatic.   Mouth/Throat: Oropharynx is clear and moist.   Neck: Neck supple.   Cardiovascular: Normal rate, regular rhythm, normal heart sounds and intact distal pulses. Exam reveals no gallop and no friction rub.    No murmur heard.  Pulmonary/Chest: Breath sounds normal. She has no wheezes. She has no rhonchi. She has no rales.   Abdominal: Soft. She exhibits no distension. There is no tenderness.   Musculoskeletal:   Lower extremity edema which is mild   Neurological: She is alert and oriented to person, place, and time.   Skin:   Bandage on right lower  extremity.   Psychiatric: She has a normal mood and affect.         ED Course   Procedures  Labs Reviewed - No data to display  EKG Readings: (Independently Interpreted)   Previous EKG: Compared with most recent EKG Previous EKG Date: No change from February 6 2017. Rhythm: Normal Sinus Rhythm. Heart Rate: 89. Ectopy: No Ectopy. Conduction: Normal. ST Segments: Normal ST Segments. T Waves: Normal. Clinical Impression: Normal Sinus Rhythm          Medical Decision Making:   Clinical Tests:   Lab Tests: Ordered and Reviewed  Radiological Study: Ordered and Reviewed  Medical Tests: Ordered and Reviewed            Scribe Attestation:   Scribe #1: I performed the above scribed service and the documentation accurately describes the services I performed. I attest to the accuracy of the note.    Attending Attestation:           Physician Attestation for Scribe:  Physician Attestation Statement for Scribe #1: I, Dr. Glaser, reviewed documentation, as scribed by Agnieszka Marks in my presence, and it is both accurate and complete.                 ED Course     Clinical Impression:    \\   The encounter diagnosis was Fatigue, unspecified type.      84-year-old female with a history of asthma presents for fatigue.  Well-appearing in the emergency department.  No abdominal pain or tenderness.  No chest pain or shortness of breath.  No reports of subjective fevers or chills.  No sign of any serious illness at this time.  Emergency department workup is unremarkable for any acute findings.  Patient will be discharged home.  Voices her readiness for discharge home.  No focal extremity weakness to suggest a stroke.  No indication at all for admission to the hospital currently.     Davis Glaser MD  02/22/17 3273

## 2017-02-22 ENCOUNTER — TELEPHONE (OUTPATIENT)
Dept: PODIATRY | Facility: CLINIC | Age: 82
End: 2017-02-22

## 2017-02-22 NOTE — TELEPHONE ENCOUNTER
----- Message from Antonette Hart sent at 2/22/2017 10:28 AM CST -----  Contact: self   Patient had to reschedule appointment for tomorrow transporation didn't show up

## 2017-02-23 ENCOUNTER — HOSPITAL ENCOUNTER (INPATIENT)
Facility: HOSPITAL | Age: 82
LOS: 4 days | Discharge: NURSING, REHAB OR LONG-TERM CARE | DRG: 602 | End: 2017-03-01
Attending: EMERGENCY MEDICINE | Admitting: HOSPITALIST
Payer: MEDICARE

## 2017-02-23 DIAGNOSIS — J45.20 MILD INTERMITTENT INTRINSIC ASTHMA WITHOUT COMPLICATION: ICD-10-CM

## 2017-02-23 DIAGNOSIS — R60.0 BILATERAL EDEMA OF LOWER EXTREMITY: ICD-10-CM

## 2017-02-23 DIAGNOSIS — L03.115 BILATERAL LOWER LEG CELLULITIS: ICD-10-CM

## 2017-02-23 DIAGNOSIS — M25.511 RIGHT SHOULDER PAIN: ICD-10-CM

## 2017-02-23 DIAGNOSIS — L97.529 FOOT ULCER, LEFT: ICD-10-CM

## 2017-02-23 DIAGNOSIS — M79.601 RIGHT ARM PAIN: ICD-10-CM

## 2017-02-23 DIAGNOSIS — L03.116 BILATERAL LOWER LEG CELLULITIS: ICD-10-CM

## 2017-02-23 DIAGNOSIS — L73.2 RIGHT AXILLARY HIDRADENITIS: ICD-10-CM

## 2017-02-23 LAB
ALBUMIN SERPL BCP-MCNC: 3.3 G/DL
ALP SERPL-CCNC: 68 U/L
ALT SERPL W/O P-5'-P-CCNC: 22 U/L
AMORPH CRY URNS QL MICRO: ABNORMAL
ANION GAP SERPL CALC-SCNC: 12 MMOL/L
AST SERPL-CCNC: 41 U/L
BACTERIA #/AREA URNS HPF: ABNORMAL /HPF
BASOPHILS # BLD AUTO: 0 K/UL
BASOPHILS NFR BLD: 0.4 %
BILIRUB SERPL-MCNC: 0.5 MG/DL
BILIRUB UR QL STRIP: ABNORMAL
BUN SERPL-MCNC: 21 MG/DL
CALCIUM SERPL-MCNC: 9.9 MG/DL
CHLORIDE SERPL-SCNC: 94 MMOL/L
CLARITY UR: ABNORMAL
CO2 SERPL-SCNC: 32 MMOL/L
COLOR UR: YELLOW
CREAT SERPL-MCNC: 0.7 MG/DL
CRP SERPL-MCNC: 31.1 MG/L
DIFFERENTIAL METHOD: ABNORMAL
EOSINOPHIL # BLD AUTO: 0 K/UL
EOSINOPHIL NFR BLD: 0.7 %
ERYTHROCYTE [DISTWIDTH] IN BLOOD BY AUTOMATED COUNT: 14.8 %
ERYTHROCYTE [SEDIMENTATION RATE] IN BLOOD BY WESTERGREN METHOD: 37 MM/HR
EST. GFR  (AFRICAN AMERICAN): >60 ML/MIN/1.73 M^2
EST. GFR  (NON AFRICAN AMERICAN): >60 ML/MIN/1.73 M^2
GLUCOSE SERPL-MCNC: 147 MG/DL
GLUCOSE UR QL STRIP: NEGATIVE
HCT VFR BLD AUTO: 39.1 %
HGB BLD-MCNC: 12.4 G/DL
HGB UR QL STRIP: NEGATIVE
KETONES UR QL STRIP: ABNORMAL
LEUKOCYTE ESTERASE UR QL STRIP: NEGATIVE
LYMPHOCYTES # BLD AUTO: 0.6 K/UL
LYMPHOCYTES NFR BLD: 10 %
MAGNESIUM SERPL-MCNC: 1.6 MG/DL
MCH RBC QN AUTO: 27 PG
MCHC RBC AUTO-ENTMCNC: 31.7 %
MCV RBC AUTO: 85 FL
MICROSCOPIC COMMENT: ABNORMAL
MONOCYTES # BLD AUTO: 0.6 K/UL
MONOCYTES NFR BLD: 9.3 %
NEUTROPHILS # BLD AUTO: 5 K/UL
NEUTROPHILS NFR BLD: 79.6 %
NITRITE UR QL STRIP: NEGATIVE
PH UR STRIP: 6 [PH] (ref 5–8)
PHOSPHATE SERPL-MCNC: 2.3 MG/DL
PLATELET # BLD AUTO: 218 K/UL
PMV BLD AUTO: 8.5 FL
POTASSIUM SERPL-SCNC: 3.5 MMOL/L
PROT SERPL-MCNC: 7 G/DL
PROT UR QL STRIP: ABNORMAL
RBC # BLD AUTO: 4.6 M/UL
RBC #/AREA URNS HPF: 1 /HPF (ref 0–4)
SODIUM SERPL-SCNC: 138 MMOL/L
SP GR UR STRIP: 1.02 (ref 1–1.03)
SQUAMOUS #/AREA URNS HPF: 11 /HPF
TSH SERPL DL<=0.005 MIU/L-ACNC: 2.13 UIU/ML
URN SPEC COLLECT METH UR: ABNORMAL
UROBILINOGEN UR STRIP-ACNC: 1 EU/DL
WBC # BLD AUTO: 6.3 K/UL
WBC #/AREA URNS HPF: 5 /HPF (ref 0–5)

## 2017-02-23 PROCEDURE — 85651 RBC SED RATE NONAUTOMATED: CPT

## 2017-02-23 PROCEDURE — 80053 COMPREHEN METABOLIC PANEL: CPT

## 2017-02-23 PROCEDURE — 86140 C-REACTIVE PROTEIN: CPT

## 2017-02-23 PROCEDURE — 93005 ELECTROCARDIOGRAM TRACING: CPT

## 2017-02-23 PROCEDURE — 83735 ASSAY OF MAGNESIUM: CPT

## 2017-02-23 PROCEDURE — 84100 ASSAY OF PHOSPHORUS: CPT

## 2017-02-23 PROCEDURE — 85025 COMPLETE CBC W/AUTO DIFF WBC: CPT

## 2017-02-23 PROCEDURE — 99285 EMERGENCY DEPT VISIT HI MDM: CPT

## 2017-02-23 PROCEDURE — 84443 ASSAY THYROID STIM HORMONE: CPT

## 2017-02-23 PROCEDURE — 36415 COLL VENOUS BLD VENIPUNCTURE: CPT

## 2017-02-23 PROCEDURE — 81000 URINALYSIS NONAUTO W/SCOPE: CPT

## 2017-02-23 PROCEDURE — 25000003 PHARM REV CODE 250: Performed by: EMERGENCY MEDICINE

## 2017-02-23 RX ORDER — ACETAMINOPHEN 500 MG
500 TABLET ORAL
Status: COMPLETED | OUTPATIENT
Start: 2017-02-23 | End: 2017-02-23

## 2017-02-23 RX ADMIN — ACETAMINOPHEN 500 MG: 500 TABLET ORAL at 11:02

## 2017-02-23 NOTE — IP AVS SNAPSHOT
[address] [place] [address]  Phone: [phone]           Patient Discharge Instructions     Our goal is to set you up for success. This packet includes information on your condition, medications, and your home care. It will help you to care for yourself so you don't get sicker and need to go back to the hospital.     Please ask your nurse if you have any questions.        There are many details to remember when preparing to leave the hospital. Here is what you will need to do:    1. Take your medicine. If you are prescribed medications, review your Medication List in the following pages. You may have new medications to  at the pharmacy and others that you'll need to stop taking. Review the instructions for how and when to take your medications. Talk with your doctor or nurses if you are unsure of what to do.     2. Go to your follow-up appointments. Specific follow-up information is listed in the following pages. Your may be contacted by a transition nurse or clinical provider about future appointments. Be sure we have all of the phone numbers to reach you, if needed. Please contact your provider's office if you are unable to make an appointment.     3. Watch for warning signs. Your doctor or nurse will give you detailed warning signs to watch for and when to call for assistance. These instructions may also include educational information about your condition. If you experience any of warning signs to your health, call your doctor.               ** Verify the list of medication(s) below is accurate and up to date. Carry this with you in case of emergency. If your medications have changed, please notify your healthcare provider.             Medication List      START taking these medications        Additional Info                      cefUROXime 250 MG tablet   Commonly known as:  CEFTIN   Quantity:  14 tablet   Refills:  0   Dose:  250 mg   Indications:   Skin & soft tissue    Last time this was given:  250 mg on 3/1/2017  9:07 AM   Instructions:  Take 1 tablet (250 mg total) by mouth every 12 (twelve) hours.     Begin Date    AM    Noon    PM    Bedtime       docusate sodium 100 MG capsule   Commonly known as:  COLACE   Refills:  0   Dose:  100 mg    Last time this was given:  100 mg on 3/1/2017  9:06 AM   Instructions:  Take 1 capsule (100 mg total) by mouth 2 (two) times daily.     Begin Date    AM    Noon    PM    Bedtime       triamcinolone acetonide 0.1% 0.1 % ointment   Commonly known as:  KENALOG   Quantity:  30 g   Refills:  0    Last time this was given:  3/1/2017  9:08 AM   Instructions:  Apply topically 2 (two) times daily. To left elbow after cleansing with wound cleanser. Then apply non-stick telfa and kerlix guase until healed     Begin Date    AM    Noon    PM    Bedtime         CHANGE how you take these medications        Additional Info                      clonazePAM 0.5 MG tablet   Commonly known as:  KLONOPIN   Quantity:  180 tablet   Refills:  0   What changed:    - how much to take  - when to take this  - additional instructions    Last time this was given:  0.5 mg on 2/28/2017  9:17 PM   Instructions:  0.25 mg po bid     Begin Date    AM    Noon    PM    Bedtime       furosemide 20 MG tablet   Commonly known as:  LASIX   Quantity:  30 tablet   Refills:  11   Dose:  20 mg   What changed:  when to take this    Instructions:  Take 1 tablet (20 mg total) by mouth once daily.     Begin Date    AM    Noon    PM    Bedtime         CONTINUE taking these medications        Additional Info                      albuterol 90 mcg/actuation inhaler   Commonly known as:  PROAIR HFA   Quantity:  18 g   Refills:  5   Dose:  2 puff   Comments:  Pt requesting 90 day supply.     Instructions:  Inhale 2 puffs into the lungs every 6 (six) hours as needed.     Begin Date    AM    Noon    PM    Bedtime       albuterol-ipratropium 2.5mg-0.5mg/3mL 0.5 mg-3 mg(2.5 mg  base)/3 mL nebulizer solution   Commonly known as:  DUO-NEB   Quantity:  90 vial   Refills:  5   Dose:  3 mL    Last time this was given:  3 mLs on 3/1/2017  1:25 PM   Instructions:  Take 3 mLs by nebulization every 6 (six) hours as needed for Wheezing.     Begin Date    AM    Noon    PM    Bedtime       aspirin 81 MG EC tablet   Commonly known as:  ECOTRIN   Refills:  0   Dose:  81 mg    Last time this was given:  81 mg on 3/1/2017  9:00 AM   Instructions:  Take 1 tablet (81 mg total) by mouth once daily.     Begin Date    AM    Noon    PM    Bedtime       atorvastatin 10 MG tablet   Commonly known as:  LIPITOR   Quantity:  30 tablet   Refills:  0   Dose:  10 mg    Last time this was given:  10 mg on 2/28/2017  9:17 PM   Instructions:  Take 1 tablet (10 mg total) by mouth every evening.     Begin Date    AM    Noon    PM    Bedtime       budesonide-formoterol 80-4.5 mcg 80-4.5 mcg/actuation Hfaa   Commonly known as:  SYMBICORT   Quantity:  10.2 g   Refills:  11   Dose:  2 puff   Indications:  Intrinsic Asthma    Instructions:  Inhale 2 puffs into the lungs once daily.     Begin Date    AM    Noon    PM    Bedtime       fluticasone 50 mcg/actuation nasal spray   Commonly known as:  FLONASE   Quantity:  48 g   Refills:  3   Dose:  2 spray    Last time this was given:  2 sprays on 3/1/2017  9:07 AM   Instructions:  2 sprays by Each Nare route once daily.     Begin Date    AM    Noon    PM    Bedtime       polyethylene glycol 17 gram/dose powder   Commonly known as:  GLYCOLAX   Quantity:  289 g   Refills:  0   Dose:  17 g    Instructions:  Take 17 g by mouth 2 (two) times daily as needed (Constipation).     Begin Date    AM    Noon    PM    Bedtime       potassium chloride 20 mEq Pack   Commonly known as:  KLOR-CON   Quantity:  30 packet   Refills:  11   Dose:  20 mEq   Comments:  Please discontinue potassium tablets as patient is unable to swallow them.    Instructions:  Take 20 mEq by mouth once daily.     Begin Date     AM    Noon    PM    Bedtime       propranolol 20 MG tablet   Commonly known as:  INDERAL   Quantity:  180 tablet   Refills:  1   Dose:  20 mg    Last time this was given:  20 mg on 2/26/2017  9:56 AM   Instructions:  Take 1 tablet (20 mg total) by mouth 2 (two) times daily. For tremors     Begin Date    AM    Noon    PM    Bedtime       trospium 20 mg Tab tablet   Commonly known as:  sanctura   Refills:  0   Dose:  20 mg    Instructions:  Take 20 mg by mouth 2 (two) times daily.     Begin Date    AM    Noon    PM    Bedtime            Where to Get Your Medications      These medications were sent to [id number] - [place] - [address] AT [facility]  [address] [facility] [place] [address]     Phone:  [phone]     cefUROXime 250 MG tablet    furosemide 20 MG tablet         You can get these medications from any pharmacy     Bring a paper prescription for each of these medications     clonazePAM 0.5 MG tablet    triamcinolone acetonide 0.1% 0.1 % ointment       You don't need a prescription for these medications     docusate sodium 100 MG capsule                  Please bring to all follow up appointments:    1. A copy of your discharge instructions.  2. All medicines you are currently taking in their original bottles.  3. Identification and insurance card.    Please arrive 15 minutes ahead of scheduled appointment time.    Please call 24 hours in advance if you must reschedule your appointment and/or time.        Your Scheduled Appointments     Mar 06, 2017  2:40 PM CST   Established Patient Visit with [facility]   [address] [address] [phone]              Follow-up Information     Follow up with [facility].    Specialties:  Nursing Home Agency, SNF Agency    Contact information:    [address] [place] [address] [phone]          Follow up with Catrachito Mart,  MD In 1 week.    Specialties:  Family Medicine, Internal Medicine    Contact information:    [address] [place] [address]  [phone]          Follow up with Vijay Chris DPM In 1 week.    Specialties:  Podiatry, Wound Care    Why:  For wound re-check    Contact information:    [address] [place] [address]  [phone]            Discharge Instructions       [facility] Facility Transfer Orders        Admit to: [facility] snf    Diagnoses:   Active Hospital Problems    Diagnosis  POA    *Bilateral lower leg cellulitis [L03.116, L03.115]  Yes    Acute hypoxemic respiratory failure [J96.01]  No    Constipation [K59.00]  Yes    Foot ulcer, left [L97.529]  Yes    Multiple falls [R29.6]  Not Applicable    Debility [R53.81]  Yes    Moderate persistent asthma without complication [J45.40]  Yes    Intention tremor [G25.2]  Yes      Resolved Hospital Problems    Diagnosis Date Resolved POA   No resolved problems to display.     Allergies:   Review of patient's allergies indicates:   Allergen Reactions    Epinephrine      Seizure like activity      Pcn [penicillins]        Code Status: full    Vitals: Every shift       Diet: diabetic diet: 1800 calorie    Activity: Activity as tolerated    Nursing Precautions: Aspiration , Fall and Pressure ulcer prevention    Bed/Surface: Low Air Loss    Consults: PT to evaluate and treat- 5 times a week and OT to evaluate and treat- 5 times a week    Oxygen: room air    Dialysis: Patient is not on dialysis.     Labs:   accucheck AC and Hs  ISS  Pending Diagnostic Studies:     None            Miscellaneous Care:   Wound care left elbow-cleanse daily and apply triamcinolone ointment 0.1% bid-then apply nonstick telfa and guase   Wound care left foot -cleanse daily with wound cleanser daily and apply mepilex border guase         Medications: Discontinue all previous medication orders, if any. See new list below.  Current Discharge Medication List       START taking these medications    Details   cefUROXime (CEFTIN) 250 MG tablet Take 1 tablet (250 mg total) by mouth every 12 (twelve) hours.  Qty: 14 tablet, Refills: 0      docusate sodium (COLACE) 100 MG capsule Take 1 capsule (100 mg total) by mouth 2 (two) times daily.  Refills: 0      triamcinolone acetonide 0.1% (KENALOG) 0.1 % ointment Apply topically 2 (two) times daily. To left elbow after cleansing with wound cleanser. Then apply non-stick telfa and kerlix guase until healed  Qty: 30 g, Refills: 0         CONTINUE these medications which have CHANGED    Details   clonazePAM (KLONOPIN) 0.5 MG tablet 0.25 mg po bid  Qty: 180 tablet, Refills: 0    Associated Diagnoses: 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      furosemide (LASIX) 20 MG tablet Take 1 tablet (20 mg total) by mouth once daily.  Qty: 30 tablet, Refills: 11    Associated Diagnoses: Bilateral edema of lower extremity         CONTINUE these medications which have NOT CHANGED    Details   albuterol (PROAIR HFA) 90 mcg/actuation inhaler Inhale 2 puffs into the lungs every 6 (six) hours as needed.  Qty: 18 g, Refills: 5    Comments: Pt requesting 90 day supply.   Associated Diagnoses: Intrinsic asthma, mild intermittent, uncomplicated      aspirin (ECOTRIN) 81 MG EC tablet Take 1 tablet (81 mg total) by mouth once daily.  Refills: 0      budesonide-formoterol 80-4.5 mcg (SYMBICORT) 80-4.5 mcg/actuation HFAA Inhale 2 puffs into the lungs once daily.  Qty: 10.2 g, Refills: 11    Associated Diagnoses: Intrinsic asthma, mild intermittent, uncomplicated      fluticasone (FLONASE) 50 mcg/actuation nasal spray 2 sprays by Each Nare route once daily.  Qty: 48 g, Refills: 3    Associated Diagnoses: Rhinitis, chronic      polyethylene glycol (GLYCOLAX) 17 gram/dose powder Take 17 g by mouth 2 (two) times daily as needed (Constipation).  Qty: 289 g, Refills: 0      propranolol (INDERAL) 20 MG tablet Take 1 tablet (20 mg total) by mouth 2  (two) times daily. For tremors  Qty: 180 tablet, Refills: 1    Associated Diagnoses: Intention tremor      albuterol-ipratropium 2.5mg-0.5mg/3mL (DUO-NEB) 0.5 mg-3 mg(2.5 mg base)/3 mL nebulizer solution Take 3 mLs by nebulization every 6 (six) hours as needed for Wheezing.  Qty: 90 vial, Refills: 5    Associated Diagnoses: Intrinsic asthma, unspecified asthma severity, uncomplicated      atorvastatin (LIPITOR) 10 MG tablet Take 1 tablet (10 mg total) by mouth every evening.  Qty: 30 tablet, Refills: 0      potassium chloride (KLOR-CON) 20 mEq Pack Take 20 mEq by mouth once daily.  Qty: 30 packet, Refills: 11    Comments: Please discontinue potassium tablets as patient is unable to swallow them.      trospium (SANCTURA) 20 mg Tab tablet Take 20 mg by mouth 2 (two) times daily.           Follow up:   Follow-up Information     Follow up with [facility].    Specialties:  Nursing Home Agency, SNF Agency    Contact information:    [address] [place] [address]  [phone]          Follow up with Catrachito Mart MD In 1 week.    Specialties:  Family Medicine, Internal Medicine    Contact information:    [address] [place] [address]  [phone]              Primary Diagnosis     Your primary diagnosis was:  Bilateral Lower Leg Cellulitis      Admission Information     Date & Time Provider Department CSN    2/23/2017  8:57 PM Jocelyn Lindquist MD [facility] [id number]      Care Providers     Provider Role Specialty Primary office phone    Jocelyn Lindquist MD Attending Provider Hospitalist [phone]    Vijay Chris DPM Consulting Physician  Podiatry [phone]    Paul Galvan MD Consulting Physician  Orthopedic Surgery [phone]      Your Vitals Were     BP                   126/59           Recent Lab Values        2/7/2017                           5:15 AM           A1C 5.8           Comment for A1C at  5:15 AM on 2/7/2017:  According to ADA guidelines,  hemoglobin A1C <7.0% represents  optimal control in non-pregnant diabetic patients.  Different  metrics may apply to specific populations.   Standards of Medical Care in Diabetes - 2016.  For the purpose of screening for the presence of diabetes:  <5.7%     Consistent with the absence of diabetes  5.7-6.4%  Consistent with increasing risk for diabetes   (prediabetes)  >or=6.5%  Consistent with diabetes  Currently no consensus exists for use of hemoglobin A1C  for diagnosis of diabetes for children.        Allergies as of 3/1/2017        Reactions    Epinephrine     Seizure like activity    Pcn [Penicillins]       [facility]     [facility] Nurse Care Line - 24/7 Assistance  Unless otherwise directed by your provider, please contact [facility], our nurse care line that is available for 24/7 assistance.     Registered nurses in the [facility] provide clinical advisement, health education, appointment booking, and other advisory services.  Call for this free service at [phone].        Advance Directives     An advance directive is a document which, in the event you are no longer able to make decisions for yourself, tells your healthcare team what kind of treatment you do or do not want to receive, or who you would like to make those decisions for you.  If you do not currently have an advance directive, [facility] encourages you to create one.  For more information call:  [phone]), [phone] ([phone]),  or log on to [email or web address].        Language Assistance Services     ATTENTION: Language assistance services are available, free of charge. Please call [phone].      ATENCIÓN: Si habla español, tiene a barrientos disposición servicios gratuitos de asistencia lingüística. Llame al [phone].     CHÚ Ý: N?u b?n nói Ti?ng Vi?t, có các d?ch v? h? tr? ngôn ng? mi?n phí dành cho b?n. G?i s? [phone].         [facility] complies  with applicable Federal civil rights laws and does not discriminate on the basis of race, color, national origin, age, disability, or sex.

## 2017-02-24 ENCOUNTER — TELEPHONE (OUTPATIENT)
Dept: PODIATRY | Facility: CLINIC | Age: 82
End: 2017-02-24

## 2017-02-24 PROBLEM — R29.6 MULTIPLE FALLS: Status: ACTIVE | Noted: 2017-02-24

## 2017-02-24 PROBLEM — R53.81 DEBILITY: Status: ACTIVE | Noted: 2017-02-24

## 2017-02-24 PROBLEM — L03.116 BILATERAL LOWER LEG CELLULITIS: Status: ACTIVE | Noted: 2017-02-24

## 2017-02-24 PROBLEM — L03.115 BILATERAL LOWER LEG CELLULITIS: Status: ACTIVE | Noted: 2017-02-24

## 2017-02-24 PROBLEM — L97.529 FOOT ULCER, LEFT: Status: ACTIVE | Noted: 2017-02-24

## 2017-02-24 PROCEDURE — 99900035 HC TECH TIME PER 15 MIN (STAT)

## 2017-02-24 PROCEDURE — 94640 AIRWAY INHALATION TREATMENT: CPT

## 2017-02-24 PROCEDURE — G8987 SELF CARE CURRENT STATUS: HCPCS | Mod: CK

## 2017-02-24 PROCEDURE — 97802 MEDICAL NUTRITION INDIV IN: CPT

## 2017-02-24 PROCEDURE — 25000003 PHARM REV CODE 250: Performed by: NURSE PRACTITIONER

## 2017-02-24 PROCEDURE — 97161 PT EVAL LOW COMPLEX 20 MIN: CPT

## 2017-02-24 PROCEDURE — 25000003 PHARM REV CODE 250: Performed by: HOSPITALIST

## 2017-02-24 PROCEDURE — 86580 TB INTRADERMAL TEST: CPT | Performed by: NURSE PRACTITIONER

## 2017-02-24 PROCEDURE — 97535 SELF CARE MNGMENT TRAINING: CPT

## 2017-02-24 PROCEDURE — G0378 HOSPITAL OBSERVATION PER HR: HCPCS

## 2017-02-24 PROCEDURE — G8988 SELF CARE GOAL STATUS: HCPCS | Mod: CJ

## 2017-02-24 PROCEDURE — 94761 N-INVAS EAR/PLS OXIMETRY MLT: CPT

## 2017-02-24 PROCEDURE — 25000242 PHARM REV CODE 250 ALT 637 W/ HCPCS: Performed by: HOSPITALIST

## 2017-02-24 PROCEDURE — 27000221 HC OXYGEN, UP TO 24 HOURS

## 2017-02-24 PROCEDURE — 97530 THERAPEUTIC ACTIVITIES: CPT

## 2017-02-24 PROCEDURE — 63600175 PHARM REV CODE 636 W HCPCS: Performed by: NURSE PRACTITIONER

## 2017-02-24 PROCEDURE — 97166 OT EVAL MOD COMPLEX 45 MIN: CPT

## 2017-02-24 RX ORDER — ASPIRIN 81 MG/1
81 TABLET ORAL DAILY
Status: DISCONTINUED | OUTPATIENT
Start: 2017-02-24 | End: 2017-03-01 | Stop reason: HOSPADM

## 2017-02-24 RX ORDER — PROPRANOLOL HYDROCHLORIDE 20 MG/1
20 TABLET ORAL 2 TIMES DAILY
Status: DISCONTINUED | OUTPATIENT
Start: 2017-02-24 | End: 2017-03-01 | Stop reason: HOSPADM

## 2017-02-24 RX ORDER — ENOXAPARIN SODIUM 100 MG/ML
40 INJECTION SUBCUTANEOUS EVERY 24 HOURS
Status: DISCONTINUED | OUTPATIENT
Start: 2017-02-24 | End: 2017-03-01

## 2017-02-24 RX ORDER — FLUTICASONE PROPIONATE 50 MCG
2 SPRAY, SUSPENSION (ML) NASAL DAILY
Status: DISCONTINUED | OUTPATIENT
Start: 2017-02-25 | End: 2017-03-01 | Stop reason: HOSPADM

## 2017-02-24 RX ORDER — CLONAZEPAM 0.5 MG/1
0.5 TABLET ORAL DAILY
Status: DISCONTINUED | OUTPATIENT
Start: 2017-02-24 | End: 2017-02-24

## 2017-02-24 RX ORDER — CLONAZEPAM 0.5 MG/1
0.5 TABLET ORAL NIGHTLY
Status: DISCONTINUED | OUTPATIENT
Start: 2017-02-25 | End: 2017-03-01 | Stop reason: HOSPADM

## 2017-02-24 RX ORDER — HYDROCODONE BITARTRATE AND ACETAMINOPHEN 7.5; 325 MG/15ML; MG/15ML
10 SOLUTION ORAL EVERY 6 HOURS PRN
Status: DISCONTINUED | OUTPATIENT
Start: 2017-02-24 | End: 2017-03-01 | Stop reason: HOSPADM

## 2017-02-24 RX ORDER — FLUTICASONE FUROATE AND VILANTEROL 100; 25 UG/1; UG/1
1 POWDER RESPIRATORY (INHALATION) DAILY
Status: DISCONTINUED | OUTPATIENT
Start: 2017-02-24 | End: 2017-03-01 | Stop reason: HOSPADM

## 2017-02-24 RX ORDER — IPRATROPIUM BROMIDE AND ALBUTEROL SULFATE 2.5; .5 MG/3ML; MG/3ML
3 SOLUTION RESPIRATORY (INHALATION) EVERY 6 HOURS PRN
Status: DISCONTINUED | OUTPATIENT
Start: 2017-02-24 | End: 2017-03-01 | Stop reason: HOSPADM

## 2017-02-24 RX ORDER — INSULIN ASPART 100 [IU]/ML
0-5 INJECTION, SOLUTION INTRAVENOUS; SUBCUTANEOUS
Status: DISCONTINUED | OUTPATIENT
Start: 2017-02-24 | End: 2017-03-01 | Stop reason: HOSPADM

## 2017-02-24 RX ORDER — ONDANSETRON 2 MG/ML
8 INJECTION INTRAMUSCULAR; INTRAVENOUS EVERY 8 HOURS PRN
Status: DISCONTINUED | OUTPATIENT
Start: 2017-02-24 | End: 2017-03-01

## 2017-02-24 RX ORDER — ATORVASTATIN CALCIUM 10 MG/1
10 TABLET, FILM COATED ORAL NIGHTLY
Status: DISCONTINUED | OUTPATIENT
Start: 2017-02-24 | End: 2017-03-01 | Stop reason: HOSPADM

## 2017-02-24 RX ORDER — IPRATROPIUM BROMIDE AND ALBUTEROL SULFATE 2.5; .5 MG/3ML; MG/3ML
3 SOLUTION RESPIRATORY (INHALATION) EVERY 6 HOURS PRN
Status: DISCONTINUED | OUTPATIENT
Start: 2017-02-24 | End: 2017-02-24

## 2017-02-24 RX ORDER — OXYBUTYNIN CHLORIDE 5 MG/1
5 TABLET ORAL 2 TIMES DAILY
Status: DISCONTINUED | OUTPATIENT
Start: 2017-02-24 | End: 2017-03-01 | Stop reason: HOSPADM

## 2017-02-24 RX ADMIN — CEFTAROLINE FOSAMIL 600 MG: 600 POWDER, FOR SOLUTION INTRAVENOUS at 03:02

## 2017-02-24 RX ADMIN — HYDROCODONE BITARTRATE AND ACETAMINOPHEN 10 ML: 2.5; 108 SOLUTION ORAL at 11:02

## 2017-02-24 RX ADMIN — IPRATROPIUM BROMIDE AND ALBUTEROL SULFATE 3 ML: .5; 3 SOLUTION RESPIRATORY (INHALATION) at 06:02

## 2017-02-24 RX ADMIN — CLONAZEPAM 0.5 MG: 0.5 TABLET ORAL at 09:02

## 2017-02-24 RX ADMIN — TUBERCULIN PURIFIED PROTEIN DERIVATIVE 5 UNITS: 5 INJECTION, SOLUTION INTRADERMAL at 08:02

## 2017-02-24 RX ADMIN — ENOXAPARIN SODIUM 40 MG: 100 INJECTION SUBCUTANEOUS at 11:02

## 2017-02-24 RX ADMIN — ASPIRIN 81 MG: 81 TABLET, COATED ORAL at 09:02

## 2017-02-24 RX ADMIN — CEFTAROLINE FOSAMIL 600 MG: 600 POWDER, FOR SOLUTION INTRAVENOUS at 04:02

## 2017-02-24 RX ADMIN — FLUTICASONE FUROATE AND VILANTEROL TRIFENATATE 1 PUFF: 100; 25 POWDER RESPIRATORY (INHALATION) at 07:02

## 2017-02-24 RX ADMIN — PROPRANOLOL HYDROCHLORIDE 20 MG: 20 TABLET ORAL at 09:02

## 2017-02-24 RX ADMIN — ATORVASTATIN CALCIUM 10 MG: 10 TABLET, FILM COATED ORAL at 08:02

## 2017-02-24 RX ADMIN — PROPRANOLOL HYDROCHLORIDE 20 MG: 20 TABLET ORAL at 08:02

## 2017-02-24 NOTE — PROGRESS NOTES
Received call from mayuri, nurse at Dr. Chris's office. She stated that Dr. Chris is actually on vacation until Wednesday and that there is no other MD covering for new consults for him. NP Mackenzie contacted, will consult  and notify Dr. Lindquist. Primary nurse Brandie made aware

## 2017-02-24 NOTE — ASSESSMENT & PLAN NOTE
Progressively getting worse  Associated with weakness at multiple falls  Consult PT and OT  Pt would greatly benefit from SNF for therapy  Will place PPD in anticipation  Consult dietician

## 2017-02-24 NOTE — PT/OT/SLP EVAL
Physical Therapy  Evaluation    Jennifer Diaz   MRN: [id number]   Admitting Diagnosis: Bilateral lower leg cellulitis    PT Received On: 17  PT Start Time: 1430     PT Stop Time: 1500    PT Total Time (min): 30 min       Billable Minutes:  Evaluation 15 and Therapeutic Activity 15    Diagnosis: Bilateral lower leg cellulitis     History of recent falls  Past Medical History:   Diagnosis Date    Acid reflux     Family history of breast cancer     Family history of diabetes mellitus     Intrinsic asthma     Psoriasis       Past Surgical History:   Procedure Laterality Date    DILATION AND CURETTAGE OF UTERUS      x2    TONSILLECTOMY, ADENOIDECTOMY  as child    TOTAL ABDOMINAL HYSTERECTOMY W/ BILATERAL SALPINGOOPHORECTOMY  2012    UMBILICAL HERNIA REPAIR         Referring physician: Ameena  Date referred to PT: 2017    General Precautions: Standard, fall       Patient History:  Lives With: alone  Number of Stairs to Enter Home: 1  Equipment Currently Used at Home: rollator     Previous Level of Function:  Ambulation Skills: needs device  Transfer Skills: needs device  ADL Skills: needs device    Subjective:  Communicated with MD, OT prior to session.     Pain Ratin/10               Pain Rating Post-Intervention: 0/10    Objective:   Patient found with: peripheral IV     Upper Extremity Range of Motion: see OT eval    Upper Extremity Strength: see OT eval    Functional Mobility:  Bed Mobility:  Rolling/Turning Right: Moderate assistance  Scooting/Bridging: Moderate Assistance  Supine to Sit: Moderate Assistance  Sit to Supine: Minimum Assistance    Transfers:  Sit <> Stand Assistance: Minimum Assistance  Sit <> Stand Assistive Device: Grab bars, Rolling Walker    Gait:   Gait Distance: 8  Assistance 1: Minimum assistance  Gait Assistive Device: Rolling walker    Therapeutic Activities and Exercises:  Spent full 10 minutes positioning pt in bed to her insistence including scooting,  bridging, and rolling with mod assist and cues.    AM-PAC 6 CLICK MOBILITY  How much help from another person does this patient currently need?   1 = Unable, Total/Dependent Assistance  2 = A lot, Maximum/Moderate Assistance  3 = A little, Minimum/Contact Guard/Supervision  4 = None, Modified [place]/Independent    Turning over in bed (including adjusting bedclothes, sheets and blankets)?: 2  Sitting down on and standing up from a chair with arms (e.g., wheelchair, bedside commode, etc.): 3  Moving from lying on back to sitting on the side of the bed?: 2  Moving to and from a bed to a chair (including a wheelchair)?: 2  Need to walk in hospital room?: 3  Climbing 3-5 steps with a railing?: 1  Total Score: 13     AM-PAC Raw Score CMS G-Code Modifier Level of Impairment Assistance   6 % Total / Unable   7 - 9 CM 80 - 100% Maximal Assist   10 - 14 CL 60 - 80% Moderate Assist   15 - 19 CK 40 - 60% Moderate Assist   20 - 22 CJ 20 - 40% Minimal Assist   23 CI 1-20% SBA / CGA   24 CH 0% Independent/ Mod I     Patient left right sidelying with all lines intact, call button in reach and CNA present.    Assessment:   Jennifer Diaz is a 84 y.o. female with a medical diagnosis of Bilateral lower leg cellulitis.    Rehab identified problem list/impairments: Rehab identified problem list/impairments: weakness, impaired functional mobilty, gait instability, impaired endurance, impaired balance, impaired skin    Rehab potential is fair.    Activity tolerance: Fair fearful of falling.    GOALS:   Physical Therapy Goals        Problem: Physical Therapy Goal    Goal Priority Disciplines Outcome Goal Variances Interventions   Physical Therapy Goal     PT/OT, PT      Description:  Goals to be met by: 3/04/2017     Patient will increase functional independence with mobility by performin). Supine to sit with Modified [place]  2). Sit to supine with Modified [place]  3). Sit to stand transfer with  Modified [place]  4). Gait  x  > 50 feet with Modified [place] using Rolling Walker.                 PLAN:    Patient to be seen 6 x/week to address the above listed problems via gait training, therapeutic activities, therapeutic exercises  Plan of Care expires: 03/04/17  Plan of Care reviewed with: patient          Leandro ALVAREZ Joel, PT  02/24/2017

## 2017-02-24 NOTE — PT/OT/SLP EVAL
Occupational Therapy  Evaluation    Jennifer Diaz   MRN: [id number]   Admitting Diagnosis: Bilateral lower leg cellulitis    OT Date of Treatment: 02/24/17   OT Start Time: 1402  OT Stop Time: 1429  OT Total Time (min): 27 min    Billable Minutes:  Evaluation 8  Self Care/Home Management 19    Diagnosis: Bilateral lower leg cellulitis   Multiple recent falls, debility    Past Medical History:   Diagnosis Date    Acid reflux     Family history of breast cancer     Family history of diabetes mellitus     Intrinsic asthma     Psoriasis       Past Surgical History:   Procedure Laterality Date    DILATION AND CURETTAGE OF UTERUS      x2    TONSILLECTOMY, ADENOIDECTOMY  as child    TOTAL ABDOMINAL HYSTERECTOMY W/ BILATERAL SALPINGOOPHORECTOMY  6/2012    UMBILICAL HERNIA REPAIR         Referring physician: ALEXX Morrison  Date referred to OT: 2/24/17    General Precautions: Standard, fall (HOB  to 30*)  Orthopedic Precautions: N/A  Braces:            Patient History:  Living Environment  Lives With: alone  Living Arrangements: house  Home Accessibility: stairs to enter home  Number of Stairs to Enter Home: 1  Transportation Available: family or friend will provide, agency transportation  Living Environment Comment: Pt lives alone in a [facility] with 1 LUIS F and son and daughter in law live across the street though they may be moving to [place] soon. Pt plans to discharge home with son and then find a [facility] community to live at in [place].  Equipment Currently Used at Home: shower chair, rollator    Prior level of function:   Bed Mobility/Transfers: needs device  Grooming: independent  Bathing: needs device  Upper Body Dressing: independent  Lower Body Dressing: independent  Toileting: independent  Home Management Skills: needs device  Homemaking Responsibilities: Yes  Meal Prep Responsibility: Primary  Laundry Responsibility: Primary  Mode of Transportation: Family  IADL Comments: PTA, pt was Mod I with all ADLs &  "some IADLs using a rollator but reports multiple falls with using rollator. Family & [facility] provide transportation.     Dominant hand: right    Subjective:  Communicated with nurse Brandie prior to session.  Stated patient was cleared for OT today.  Chief Complaint: " My R foot needs to be taped."  Patient/Family stated goals: to stop falling    Pain Ratin/10              Pain Rating Post-Intervention: 0/10    Objective:  Patient found with: peripheral IV, telemetry    Cognitive Exam:  Oriented to: Person, Place, Time and Situation  Follows Commands/attention: Follows two-step commands  Communication: clear/fluent  Memory:  No Deficits noted  Safety awareness/insight to disability: intact  Coping skills/emotional control: Appropriate to situation    Visual/perceptual:  Impaired  acuity    Physical Exam:  Postural examination/scapula alignment: Rounded shoulder, Head forward and LE's externally rotated  Skin integrity: redness of both lower legs  Edema: Moderate R & L LE with R>L    Sensation:   Intact B UE's    Upper Extremity Range of Motion:  Right Upper Extremity: WFL  Left Upper Extremity: WFL    Upper Extremity Strength:  Right Upper Extremity: WFL  Left Upper Extremity: WFL   Strength: WFL    Fine motor coordination:   Intact    Gross motor coordination: WFL    Functional Mobility:  Bed Mobility:  Rolling/Turning Right: Stand by assistance, With side rail  Scooting/Bridging: Stand by Assistance, With side rail  Supine to Sit: Stand by Assistance, WIth side rail  Sit to Supine: Minimum Assistance, With side rail    Transfers:  Sit <> Stand Assistance: Minimum Assistance (cues for hand placement)  Sit <> Stand Assistive Device: Rolling Walker  Toilet Transfer Technique: Stand Pivot  Toilet Transfer Assistance: Minimum Assistance  Toilet Transfer Assistive Device: bedside commode, Rolling Walker    Functional Ambulation: Pt took 5-6 steps x 2 bed <>BSC with Min A using walker with cues needed for " "sequencing and safety. No LOB.    Activities of Daily Living:  Feeding Level of Assistance: Independent    UE Dressing Level of Assistance: Set-up Assistance    LE Dressing Level of Assistance: Contact guard (SBA socks seated EOB, CGA standing)    Balance:   Static Sit: FAIR+: Able to take MINIMAL challenges from all directions  Dynamic Sit: FAIR+: Maintains balance through MINIMAL excursions of active trunk motion  Static Stand: POOR+: Needs MINIMAL assist to maintain  Dynamic stand: POOR: N/A    Therapeutic Activities and Exercises:  OT ed pt on OT role & POC as well as discharge recommendations.  OT ed patient on safety with walker use for transfers with cues for hand placement & sequencing.   OT educated patient on bedside commode transfer techniques using rolling walker.      AM-PAC 6 CLICK ADL  How much help from another person does this patient currently need?  1 = Unable, Total/Dependent Assistance  2 = A lot, Maximum/Moderate Assistance  3 = A little, Minimum/Contact Guard/Supervision  4 = None, Modified Morgan/Independent    Putting on and taking off regular lower body clothing? : 3  Bathing (including washing, rinsing, drying)?: 3  Toileting, which includes using toilet, bedpan, or urinal? : 3  Putting on and taking off regular upper body clothing?: 3  Taking care of personal grooming such as brushing teeth?: 3  Eating meals?: 4  Total Score: 19    AM-PAC Raw Score CMS "G-Code Modifier Level of Impairment Assistance   6 % Total / Unable   7 - 9 CM 80 - 100% Maximal Assist   10 - 14 CL 60 - 80% Moderate Assist   15 - 19 CK 40 - 60% Moderate Assist   20 - 22 CJ 20 - 40% Minimal Assist   23 CI 1-20% SBA / CGA   24 CH 0% Independent/ Mod I       Patient left supine with all lines intact, call button in reach, Brandie, nurse notified and Dr. Lindquist present    Assessment:  Jennifer Diaz is a 84 y.o. female with a medical diagnosis of Bilateral lower leg cellulitis and presents with " functional decline impacting ADL status and placing pt at high fall risk. Recommend OT treatment to maximize endurance, safety & independence with ADLs.    Rehab identified problem list/impairments: Rehab identified problem list/impairments: weakness, impaired endurance, impaired skin, edema, decreased lower extremity function, impaired self care skills, impaired functional mobilty, gait instability, impaired balance    Rehab potential is good.    Activity tolerance: Fair    Discharge recommendations: Discharge Facility/Level Of Care Needs: home health PT, home health OT (and family assistance)     Barriers to discharge: Barriers to Discharge: None    Equipment recommendations: walker, rolling, 3-in-1 commode     GOALS:   Occupational Therapy Goals        Problem: Occupational Therapy Goal    Goal Priority Disciplines Outcome Interventions   Occupational Therapy Goal     OT, PT/OT Ongoing (interventions implemented as appropriate)    Description:  Goals to be met by: 3/4/17     Patient will increase functional independence with ADLs by performing:    LE Dressing with Set-up Assistance, Stand-by Assistance and Assistive Devices as needed.  Grooming while standing at sink with Stand-by Assistance and Assistive Devices as needed.  Toileting from toilet with Stand-by Assistance and Assistive Devices as needed for hygiene and clothing management.   Toilet transfer to toilet with Stand-by Assistance.  Upper extremity exercise program x 15 reps per handout, with assistance as needed.                PLAN:  Patient to be seen 3 x/week, 5 x/week to address the above listed problems via self-care/home management, therapeutic activities, therapeutic exercises  Plan of Care expires: 03/04/17  Plan of Care reviewed with: patient    OT G-codes  Functional Assessment Tool Used: [facility]  Score: 19  Functional Limitation: Self care  Self Care Current Status (): CK  Self Care Goal Status (): MCKAY Lassiter,  LOTR  02/24/2017

## 2017-02-24 NOTE — PLAN OF CARE
Problem: Nutrition, Imbalanced: Inadequate Oral Intake (Adult)  Goal: Identify Related Risk Factors and Signs and Symptoms  Related risk factors and signs and symptoms are identified upon initiation of Human Response Clinical Practice Guideline (CPG)  Recommendation/Intervention: 1) Continue cardiac diet 2) Add Boost Plus with meals 3) Consider adding calcium with vitamin D re pt concern for bone health 4) Intervention: Educated pt on high protein foods and supplements   Goals: 1) Meal intake >= 75% 2) Utilize supplement  Nutrition Goal Status: new  Communication of RD Recs: (sticky note)

## 2017-02-24 NOTE — NURSING
Called respiratory per pt request for resp tx. Pt expressed problems with air condition being too loud. Air condition was turned off. Pt requesting new room with working air condition. Updated plan of care. Working on getting new room assignment. Will continue to monitor.

## 2017-02-24 NOTE — PROGRESS NOTES
Cm completed the assessment with pt and daughter was on the phone when I entered the room.  Cm called pt's daughter Tami (POA) to get extra information.  Pt was hospitalized 2 weeks ago. She was ordered HH.  Daughter feels her mother need to go to IP-Rehab or SNF to get more PT/OT and regain her strength.  Tami informed me pt has fallen 3x since last admit (2/6/17).  She is concerned that HH-PT/OT is not enough.  Cm informed daughter that I would speak with Dr. Lindquist and informed her of your concerns and we will order PT/OT evaluation.  PCP is Dr. Mart.  Disposition:  Undetermined at this time.  Cm will follow-up.       02/24/17 0930   Discharge Assessment   Assessment Type Discharge Planning Assessment   Confirmed/corrected address and phone number on facesheet? Yes   Assessment information obtained from? Patient   Type of Healthcare Directive Received Durable power of  for health care  (Daughter Tami has POA - [phone])   Prior to hospitilization cognitive status: Alert/Oriented   Prior to hospitalization functional status: Independent;Assistive Equipment   Current Functional Status: Independent;Assistive Equipment   Arrived From home or self-care;home health  (Observation)   Lives With alone   Able to Return to Prior Arrangements unable to determine at this time (comments)   Is patient able to care for self after discharge? Unable to determine at this time (comments)   How many people do you have in your home that can help with your care after discharge? 0   Who are your caregiver(s) and their phone number(s)? daughter - Tami [phone]   Patient's perception of discharge disposition home or selfcare;home health   Patient currently being followed by outpatient case management? Yes   If yes, name of outpatient case management following: insurance company assigned oupatient case management   Patient currently receives home health services? Yes   Patient previously received home health  services and would like to resume services if necessary? Yes   If yes, name of home health provider: [facility]   Does the patient currently use HME? Yes   Patient currently receives private duty nursing? N/A   Patient currently receives any other outside agency services? No   Equipment Currently Used at Home rollator;shower chair  (NEBULIZER)   Do you have any problems affording any of your prescribed medications? No   Is the patient taking medications as prescribed? yes  ([facility])   Do you have any financial concerns preventing you from receiving the healthcare you need? No  (Insurance verified as [facility])   Does the patient have transportation to healthcare appointments? Yes   Transportation Available agency transportation   Does the patient receive services at the [facility]? No   Are there any open cases? No   Discharge Plan A Skilled Nursing Facility   Discharge Plan B Home;Home Health   Patient/Family In Agreement With Plan yes

## 2017-02-24 NOTE — CONSULTS
Consult Note  Orthopedics    Consult Requested By: internal medicine  Reason for Consult: RIGHT foot wound    SUBJECTIVE:     History of Present Illness:  Patient is a 84 y.o. female presents with RIGHT foot wound that has been present for several weeks.  Reports having a blister at the site which occurs often due to the pattern of pressure on her foot while ambulating.  No fever. Was admitted for frequent falls and has been placed on Abx which she is responding to nicely.      Scheduled Meds:   aspirin  81 mg Oral Daily    atorvastatin  10 mg Oral QHS    ceftaroline fosamil  600 mg Intravenous Q12H    clonazePAM  0.5 mg Oral Daily    enoxaparin  40 mg Subcutaneous Daily    fluticasone-vilanterol  1 puff Inhalation Daily    propranolol  20 mg Oral BID     Continuous Infusions:   PRN Meds:albuterol-ipratropium 2.5mg-0.5mg/3mL, hydrocodone-apap 2.5-108 MG/5 ML, insulin aspart, ondansetron    Review of patient's allergies indicates:   Allergen Reactions    Epinephrine      Seizure like activity      Pcn [penicillins]        Past Medical History:   Diagnosis Date    Acid reflux     Family history of breast cancer     Family history of diabetes mellitus     Intrinsic asthma     Psoriasis      Past Surgical History:   Procedure Laterality Date    DILATION AND CURETTAGE OF UTERUS      x2    TONSILLECTOMY, ADENOIDECTOMY  as child    TOTAL ABDOMINAL HYSTERECTOMY W/ BILATERAL SALPINGOOPHORECTOMY  6/2012    UMBILICAL HERNIA REPAIR       Family History   Problem Relation Age of Onset    Cancer Mother      pancreas    Heart attack Father     Cancer Sister      breast    Diabetes Brother      Social History   Substance Use Topics    Smoking status: Never Smoker    Smokeless tobacco: Never Used    Alcohol use No        Review of Systems:  Review of Systems   Constitutional: Positive for fatigue. Negative for activity change, appetite change, diaphoresis and fever.   HENT: Negative for congestion and facial  swelling.   Eyes: Negative for redness and itching.   Respiratory: Positive for shortness of breath. Negative for cough, chest tightness and wheezing.   Cardiovascular: Negative for chest pain, palpitations and leg swelling.   Gastrointestinal: Negative for abdominal pain, constipation, diarrhea, nausea and vomiting.   Endocrine: Negative for cold intolerance and heat intolerance.   Genitourinary: Negative for difficulty urinating, dysuria, flank pain, frequency, hematuria and urgency.   Musculoskeletal: Positive for arthralgias. Negative for back pain, joint swelling, myalgias and neck pain.   Neurological: Positive for tremors and weakness. Negative for dizziness, seizures, syncope, facial asymmetry, speech difficulty, light-headedness and headaches.   Psychiatric/Behavioral: Negative for agitation and confusion. The patient is not nervous/anxious.     OBJECTIVE:     Vital Signs (Most Recent)  Temp: 97.4 °F (36.3 °C) (02/24/17 1500)  Pulse: 84 (02/24/17 1500)  Resp: 18 (02/24/17 1500)  BP: (!) 101/44 (02/24/17 1500)  SpO2: (!) 93 % (02/24/17 1500)    Vital Signs Range (Last 24H):  Temp:  [97.4 °F (36.3 °C)-98.2 °F (36.8 °C)]   Pulse:  [78-92]   Resp:  [16-20]   BP: (101-119)/(44-81)   SpO2:  [92 %-100 %]     Physical Exam:  Awake/alert/oriented x3, No acute distress, Afebrile, Vital signs stable  Normocephalic, Atraumatic  Heart is beating at normal rate  Good inspiratory effort with unlaboured breathing  Abdomen soft/nondistended/nontender    right lower extremity  Motor intact L2-S1  Sensation intact L2-S1  2+ PT/DP pulses  Skin intact  The arch of the foot has a 4x3 cm blister with scabbing.  Able to express minimal old purulence.  Minimal odor.  Mild surrounding erythema.  No jeniffer underlying fluctuence.         Laboratory:  Recent Results (from the past 72 hour(s))   CBC auto differential    Collection Time: 02/23/17  9:54 PM   Result Value Ref Range    WBC 6.30 3.90 - 12.70 K/uL    RBC 4.60 4.00 - 5.40 M/uL     Hemoglobin 12.4 12.0 - 16.0 g/dL    Hematocrit 39.1 37.0 - 48.5 %    MCV 85 82 - 98 fL    MCH 27.0 27.0 - 31.0 pg    MCHC 31.7 (L) 32.0 - 36.0 %    RDW 14.8 (H) 11.5 - 14.5 %    Platelets 218 150 - 350 K/uL    MPV 8.5 (L) 9.2 - 12.9 fL    Gran # 5.0 1.8 - 7.7 K/uL    Lymph # 0.6 (L) 1.0 - 4.8 K/uL    Mono # 0.6 0.3 - 1.0 K/uL    Eos # 0.0 0.0 - 0.5 K/uL    Baso # 0.00 0.00 - 0.20 K/uL    Gran% 79.6 (H) 38.0 - 73.0 %    Lymph% 10.0 (L) 18.0 - 48.0 %    Mono% 9.3 4.0 - 15.0 %    Eosinophil% 0.7 0.0 - 8.0 %    Basophil% 0.4 0.0 - 1.9 %    Differential Method Automated    Comprehensive metabolic panel    Collection Time: 02/23/17  9:54 PM   Result Value Ref Range    Sodium 138 136 - 145 mmol/L    Potassium 3.5 3.5 - 5.1 mmol/L    Chloride 94 (L) 95 - 110 mmol/L    CO2 32 (H) 23 - 29 mmol/L    Glucose 147 (H) 70 - 110 mg/dL    BUN, Bld 21 8 - 23 mg/dL    Creatinine 0.7 0.5 - 1.4 mg/dL    Calcium 9.9 8.7 - 10.5 mg/dL    Total Protein 7.0 6.0 - 8.4 g/dL    Albumin 3.3 (L) 3.5 - 5.2 g/dL    Total Bilirubin 0.5 0.1 - 1.0 mg/dL    Alkaline Phosphatase 68 55 - 135 U/L    AST 41 (H) 10 - 40 U/L    ALT 22 10 - 44 U/L    Anion Gap 12 8 - 16 mmol/L    eGFR if African American >60 >60 mL/min/1.73 m^2    eGFR if non African American >60 >60 mL/min/1.73 m^2   TSH    Collection Time: 02/23/17  9:54 PM   Result Value Ref Range    TSH 2.133 0.400 - 4.000 uIU/mL   Sedimentation rate, manual    Collection Time: 02/23/17  9:54 PM   Result Value Ref Range    Sed Rate 37 (H) 0 - 20 mm/Hr   C-reactive protein    Collection Time: 02/23/17  9:54 PM   Result Value Ref Range    CRP 31.1 (H) 0.0 - 8.2 mg/L   Magnesium    Collection Time: 02/23/17  9:54 PM   Result Value Ref Range    Magnesium 1.6 1.6 - 2.6 mg/dL   Phosphorus    Collection Time: 02/23/17  9:54 PM   Result Value Ref Range    Phosphorus 2.3 (L) 2.7 - 4.5 mg/dL   Urinalysis - Clean Catch    Collection Time: 02/23/17 11:10 PM   Result Value Ref Range    Specimen UA Urine, Clean  Catch     Color, UA Yellow Yellow, Straw, Gely    Appearance, UA Cloudy (A) Clear    pH, UA 6.0 5.0 - 8.0    Specific Gravity, UA 1.025 1.005 - 1.030    Protein, UA Trace (A) Negative    Glucose, UA Negative Negative    Ketones, UA 1+ (A) Negative    Bilirubin (UA) 1+ (A) Negative    Occult Blood UA Negative Negative    Nitrite, UA Negative Negative    Urobilinogen, UA 1.0 <2.0 EU/dL    Leukocytes, UA Negative Negative   Urinalysis Microscopic    Collection Time: 02/23/17 11:10 PM   Result Value Ref Range    RBC, UA 1 0 - 4 /hpf    WBC, UA 5 0 - 5 /hpf    Bacteria, UA Rare None-Occ /hpf    Squam Epithel, UA 11 /hpf    Amorphous, UA Many (A) None-Moderate    Microscopic Comment SEE COMMENT        Diagnostic Results:  X-Ray: Reviewed  Impression    Soft tissue swelling raising consideration for cellulitis.  No radiographic evidence for osteomyelitis.        ASSESSMENT/PLAN:     83yo F with superficial RIGHT foot wound    -performed bedside I&D, applied sterile dressing  -wound care consult, bandage changes BID  -continue empiric abx coverage  -no surgical indication at this time, re-consult if refractory to current plan  -upon discharge, needs referral to orthotist for custom shoes/inserts

## 2017-02-24 NOTE — SUBJECTIVE & OBJECTIVE
Past Medical History:   Diagnosis Date    Acid reflux     Family history of breast cancer     Family history of diabetes mellitus     Intrinsic asthma     Psoriasis        Past Surgical History:   Procedure Laterality Date    DILATION AND CURETTAGE OF UTERUS      x2    TONSILLECTOMY, ADENOIDECTOMY  as child    TOTAL ABDOMINAL HYSTERECTOMY W/ BILATERAL SALPINGOOPHORECTOMY  6/2012    UMBILICAL HERNIA REPAIR         Review of patient's allergies indicates:   Allergen Reactions    Epinephrine      Seizure like activity      Pcn [penicillins]        No current facility-administered medications on file prior to encounter.      Current Outpatient Prescriptions on File Prior to Encounter   Medication Sig    albuterol (PROAIR HFA) 90 mcg/actuation inhaler Inhale 2 puffs into the lungs every 6 (six) hours as needed.    aspirin (ECOTRIN) 81 MG EC tablet Take 1 tablet (81 mg total) by mouth once daily.    budesonide-formoterol 80-4.5 mcg (SYMBICORT) 80-4.5 mcg/actuation HFAA Inhale 2 puffs into the lungs once daily.    clonazePAM (KLONOPIN) 0.5 MG tablet 0.25 mg po bid (Patient taking differently: 0.5 mg once daily. 0.25 mg po bid)    fluticasone (FLONASE) 50 mcg/actuation nasal spray 2 sprays by Each Nare route once daily.    polyethylene glycol (GLYCOLAX) 17 gram/dose powder Take 17 g by mouth 2 (two) times daily as needed (Constipation).    propranolol (INDERAL) 20 MG tablet Take 1 tablet (20 mg total) by mouth 2 (two) times daily. For tremors    albuterol-ipratropium 2.5mg-0.5mg/3mL (DUO-NEB) 0.5 mg-3 mg(2.5 mg base)/3 mL nebulizer solution Take 3 mLs by nebulization every 6 (six) hours as needed for Wheezing.    atorvastatin (LIPITOR) 10 MG tablet Take 1 tablet (10 mg total) by mouth every evening.    furosemide (LASIX) 20 MG tablet Take 1 tablet (20 mg total) by mouth 2 (two) times daily.    potassium chloride (KLOR-CON) 20 mEq Pack Take 20 mEq by mouth once daily.    trospium (SANCTURA) 20 mg Tab  tablet Take 20 mg by mouth 2 (two) times daily.     Family History     Problem Relation (Age of Onset)    Cancer Mother, Sister    Diabetes Brother    Heart attack Father        Social History Main Topics    Smoking status: Never Smoker    Smokeless tobacco: Never Used    Alcohol use No    Drug use: No    Sexual activity: No     Review of Systems   Constitutional: Positive for fatigue. Negative for activity change, appetite change, diaphoresis and fever.   HENT: Negative for congestion and facial swelling.    Eyes: Negative for redness and itching.   Respiratory: Positive for shortness of breath. Negative for cough, chest tightness and wheezing.    Cardiovascular: Negative for chest pain, palpitations and leg swelling.   Gastrointestinal: Negative for abdominal pain, constipation, diarrhea, nausea and vomiting.   Endocrine: Negative for cold intolerance and heat intolerance.   Genitourinary: Negative for difficulty urinating, dysuria, flank pain, frequency, hematuria and urgency.   Musculoskeletal: Positive for arthralgias. Negative for back pain, joint swelling, myalgias and neck pain.   Neurological: Positive for tremors and weakness. Negative for dizziness, seizures, syncope, facial asymmetry, speech difficulty, light-headedness and headaches.   Psychiatric/Behavioral: Negative for agitation and confusion. The patient is not nervous/anxious.      Objective:     Vital Signs (Most Recent):  Temp: 98.2 °F (36.8 °C) (02/24/17 0750)  Pulse: 78 (02/24/17 0750)  Resp: 16 (02/24/17 0750)  BP: (!) 101/47 (02/24/17 0750)  SpO2: 97 % (02/24/17 0750) Vital Signs (24h Range):  Temp:  [97.7 °F (36.5 °C)-98.2 °F (36.8 °C)] 98.2 °F (36.8 °C)  Pulse:  [78-92] 78  Resp:  [16-20] 16  SpO2:  [92 %-100 %] 97 %  BP: (101-113)/(47-58) 101/47     Weight: 74.4 kg (164 lb)  Body mass index is 30 kg/(m^2).    Physical Exam   Constitutional: She is oriented to person, place, and time. She appears well-developed. No distress.   HENT:    Head: Normocephalic and atraumatic.   Mouth/Throat: Oropharynx is clear and moist.   Eyes: Conjunctivae are normal. Pupils are equal, round, and reactive to light. Right eye exhibits no discharge. Left eye exhibits no discharge. No scleral icterus.   Neck: Normal range of motion. Neck supple. No JVD present. No thyromegaly present.   Cardiovascular: Normal rate and regular rhythm.  Exam reveals no gallop and no friction rub.    No murmur heard.  Pulmonary/Chest: Effort normal and breath sounds normal. No respiratory distress. She has no wheezes. She has no rales. She exhibits no tenderness.   Abdominal: Soft. Bowel sounds are normal. She exhibits no distension. There is no tenderness. There is no rebound and no guarding.   Musculoskeletal: She exhibits edema and tenderness.   Lymphadenopathy:     She has no cervical adenopathy.   Neurological: She is alert and oriented to person, place, and time. She displays normal reflexes. No cranial nerve deficit.   Skin: Skin is warm and dry. She is not diaphoretic. There is erythema.   Right foot-open wound with odorous smell and pus noted.    Psychiatric: She has a normal mood and affect. Her behavior is normal. Judgment and thought content normal.   Vitals reviewed.       Significant Labs:   CBC:   Recent Labs  Lab 02/23/17  2154   WBC 6.30   HGB 12.4   HCT 39.1        CMP:   Recent Labs  Lab 02/23/17  2154      K 3.5   CL 94*   CO2 32*   *   BUN 21   CREATININE 0.7   CALCIUM 9.9   PROT 7.0   ALBUMIN 3.3*   BILITOT 0.5   ALKPHOS 68   AST 41*   ALT 22   ANIONGAP 12   EGFRNONAA >60       Significant Imaging: multiple XRAYS reviewed for fractures, none identified

## 2017-02-24 NOTE — PROGRESS NOTES
Called consult in to Dr. Chris's office. Spoke to Pat, Dr. Chris's nurse, who stated that she would let Dr. Chris know of consult.

## 2017-02-24 NOTE — PROCEDURES
Site: RIGHT plantar foot  Anesthesia: none  Complications: none  Condition: stable  Detail: under sterile prep, I incised the roof of the blister at both the proximal and distal ends.  Using saline flushes, i was all material out from under the roof of the blister, leaving the roof intact.  Final manual expression of purulent drainage was followed by a second irrigation.  The openings were left open.  Sterile dressings were applied.    Debridement:skin only

## 2017-02-24 NOTE — PLAN OF CARE
Problem: Occupational Therapy Goal  Goal: Occupational Therapy Goal  Goals to be met by: 3/4/17     Patient will increase functional independence with ADLs by performing:    LE Dressing with Set-up Assistance, Stand-by Assistance and Assistive Devices as needed.  Grooming while standing at sink with Stand-by Assistance and Assistive Devices as needed.  Toileting from toilet with Stand-by Assistance and Assistive Devices as needed for hygiene and clothing management.   Toilet transfer to toilet with Stand-by Assistance.  Upper extremity exercise program x 15 reps per handout, with assistance as needed.  Outcome: Ongoing (interventions implemented as appropriate)  OT evaluation completed today. Goals & care plan established.     NOLVIA Barrios  2/24/2017

## 2017-02-24 NOTE — TELEPHONE ENCOUNTER
----- Message from Sherri Man sent at 2/23/2017  4:45 PM CST -----  Contact: self [phone]  Patient arrived after Dr. Chris left and missed her appointment. Please call patient to reschedule as soon as possible at [phone].

## 2017-02-24 NOTE — NURSING
Pt arrived to floor via stretcher, ambulated to bed x2 person assist, O2 a 2 L, IV to R AC intact, flushes w/o diffic, no redness/edema, ulcer to bottom L foot dry/intact, no dressing/drainage, NAD, VSS. Pt states she ambulates c walker at home w.o diffic. States she needs to find a place to live that provides help. States daughter is supposed to pick her up this weekend to bring her to live c her. Pt in bed, lowest position, call light within reach, bed locked. Will continue to monitor.

## 2017-02-24 NOTE — H&P
[facility] Medicine  History & Physical    Patient Name: Jennifer Diaz  MRN: [id number]  Admission Date: 2/23/2017  Attending Physician: Jocelyn Lindquist MD   Primary Care Provider: Catrachito Mart MD         Patient information was obtained from patient, past medical records and ER records.     Subjective:     Principal Problem:Bilateral lower leg cellulitis    Chief Complaint:   Chief Complaint   Patient presents with    Hip Pain     fell x 2 today and c/o pain to right arm/shoulder and right hip/leg; states unable to raise right arm        HPI: Ms. Diaz is an 85 yo female admitted to the services of [facility] medicine via the ER for right foot wound, bilateral lower leg cellulitis and multiple falls. Pt states that she started falling on February 1st. Pt is confident that she has not passed out causing any 3 of the falls so far this month. Pt states that her legs have been swollen since starting a medicine for her bladder. States that legs have been red for only about two weeks. Pt is not sure how long she has had a sore on the right foot. Pt lives alone. Son lives nearby but is only able to visit weekly due to work. History of asthma, uses an inhaler at home. Other past medical history as below.      Past Medical History:   Diagnosis Date    Acid reflux     Family history of breast cancer     Family history of diabetes mellitus     Intrinsic asthma     Psoriasis        Past Surgical History:   Procedure Laterality Date    DILATION AND CURETTAGE OF UTERUS      x2    TONSILLECTOMY, ADENOIDECTOMY  as child    TOTAL ABDOMINAL HYSTERECTOMY W/ BILATERAL SALPINGOOPHORECTOMY  6/2012    UMBILICAL HERNIA REPAIR         Review of patient's allergies indicates:   Allergen Reactions    Epinephrine      Seizure like activity      Pcn [penicillins]        No current facility-administered medications on file prior to encounter.      Current Outpatient Prescriptions on File Prior to  Encounter   Medication Sig    albuterol (PROAIR HFA) 90 mcg/actuation inhaler Inhale 2 puffs into the lungs every 6 (six) hours as needed.    aspirin (ECOTRIN) 81 MG EC tablet Take 1 tablet (81 mg total) by mouth once daily.    budesonide-formoterol 80-4.5 mcg (SYMBICORT) 80-4.5 mcg/actuation HFAA Inhale 2 puffs into the lungs once daily.    clonazePAM (KLONOPIN) 0.5 MG tablet 0.25 mg po bid (Patient taking differently: 0.5 mg once daily. 0.25 mg po bid)    fluticasone (FLONASE) 50 mcg/actuation nasal spray 2 sprays by Each Nare route once daily.    polyethylene glycol (GLYCOLAX) 17 gram/dose powder Take 17 g by mouth 2 (two) times daily as needed (Constipation).    propranolol (INDERAL) 20 MG tablet Take 1 tablet (20 mg total) by mouth 2 (two) times daily. For tremors    albuterol-ipratropium 2.5mg-0.5mg/3mL (DUO-NEB) 0.5 mg-3 mg(2.5 mg base)/3 mL nebulizer solution Take 3 mLs by nebulization every 6 (six) hours as needed for Wheezing.    atorvastatin (LIPITOR) 10 MG tablet Take 1 tablet (10 mg total) by mouth every evening.    furosemide (LASIX) 20 MG tablet Take 1 tablet (20 mg total) by mouth 2 (two) times daily.    potassium chloride (KLOR-CON) 20 mEq Pack Take 20 mEq by mouth once daily.    trospium (SANCTURA) 20 mg Tab tablet Take 20 mg by mouth 2 (two) times daily.     Family History     Problem Relation (Age of Onset)    Cancer Mother, Sister    Diabetes Brother    Heart attack Father        Social History Main Topics    Smoking status: Never Smoker    Smokeless tobacco: Never Used    Alcohol use No    Drug use: No    Sexual activity: No     Review of Systems   Constitutional: Positive for fatigue. Negative for activity change, appetite change, diaphoresis and fever.   HENT: Negative for congestion and facial swelling.    Eyes: Negative for redness and itching.   Respiratory: Positive for shortness of breath. Negative for cough, chest tightness and wheezing.    Cardiovascular: Negative for  chest pain, palpitations and leg swelling.   Gastrointestinal: Negative for abdominal pain, constipation, diarrhea, nausea and vomiting.   Endocrine: Negative for cold intolerance and heat intolerance.   Genitourinary: Negative for difficulty urinating, dysuria, flank pain, frequency, hematuria and urgency.   Musculoskeletal: Positive for arthralgias. Negative for back pain, joint swelling, myalgias and neck pain.   Neurological: Positive for tremors and weakness. Negative for dizziness, seizures, syncope, facial asymmetry, speech difficulty, light-headedness and headaches.   Psychiatric/Behavioral: Negative for agitation and confusion. The patient is not nervous/anxious.      Objective:     Vital Signs (Most Recent):  Temp: 98.2 °F (36.8 °C) (02/24/17 0750)  Pulse: 78 (02/24/17 0750)  Resp: 16 (02/24/17 0750)  BP: (!) 101/47 (02/24/17 0750)  SpO2: 97 % (02/24/17 0750) Vital Signs (24h Range):  Temp:  [97.7 °F (36.5 °C)-98.2 °F (36.8 °C)] 98.2 °F (36.8 °C)  Pulse:  [78-92] 78  Resp:  [16-20] 16  SpO2:  [92 %-100 %] 97 %  BP: (101-113)/(47-58) 101/47     Weight: 74.4 kg (164 lb)  Body mass index is 30 kg/(m^2).    Physical Exam   Constitutional: She is oriented to person, place, and time. She appears well-developed. No distress.   HENT:   Head: Normocephalic and atraumatic.   Mouth/Throat: Oropharynx is clear and moist.   Eyes: Conjunctivae are normal. Pupils are equal, round, and reactive to light. Right eye exhibits no discharge. Left eye exhibits no discharge. No scleral icterus.   Neck: Normal range of motion. Neck supple. No JVD present. No thyromegaly present.   Cardiovascular: Normal rate and regular rhythm.  Exam reveals no gallop and no friction rub.    No murmur heard.  Pulmonary/Chest: Effort normal and breath sounds normal. No respiratory distress. She has no wheezes. She has no rales. She exhibits no tenderness.   Abdominal: Soft. Bowel sounds are normal. She exhibits no distension. There is no  tenderness. There is no rebound and no guarding.   Musculoskeletal: She exhibits edema and tenderness.   Lymphadenopathy:     She has no cervical adenopathy.   Neurological: She is alert and oriented to person, place, and time. She displays normal reflexes. No cranial nerve deficit.   Skin: Skin is warm and dry. She is not diaphoretic. There is erythema.   Right foot-open wound with odorous smell and pus noted.    Psychiatric: She has a normal mood and affect. Her behavior is normal. Judgment and thought content normal.   Vitals reviewed.       Significant Labs:   CBC:   Recent Labs  Lab 02/23/17 2154   WBC 6.30   HGB 12.4   HCT 39.1        CMP:   Recent Labs  Lab 02/23/17 2154      K 3.5   CL 94*   CO2 32*   *   BUN 21   CREATININE 0.7   CALCIUM 9.9   PROT 7.0   ALBUMIN 3.3*   BILITOT 0.5   ALKPHOS 68   AST 41*   ALT 22   ANIONGAP 12   EGFRNONAA >60       Significant Imaging: multiple XRAYS reviewed for fractures, none identified     Assessment/Plan:     * Bilateral lower leg cellulitis  Acute  US to evaluate for DVT  Empiric coverage with Teflaro  Check CRP        Intention tremor  Chronic  Order BB per home dose       Moderate persistent asthma without complication  Despite pt feeling short of breath, there is no wheezing  Most likely there is an anxiety component involved  duonebs       Foot ulcer, left  As above, start Teflaro  Consult podiatry  Consult wound care      Multiple falls  See above       Debility  Progressively getting worse  Associated with weakness at multiple falls  Consult PT and OT  Pt would greatly benefit from SNF for therapy  Will place PPD in anticipation  Consult dietician       VTE Risk Mitigation         Ordered     enoxaparin injection 40 mg  Daily     Route:  Subcutaneous        02/24/17 0226     Medium Risk of VTE  Once      02/24/17 0226        Coco Morrison NP  Department of Hospital Medicine   [facility]

## 2017-02-24 NOTE — SUBJECTIVE & OBJECTIVE
Interval History: no complaints.   Pt very demanding -requested I call the orthopedic dr  To return as she needed to ask him more questions.     Review of Systems   Respiratory: Negative.    Cardiovascular: Negative.    Gastrointestinal: Negative.      Objective:     Vital Signs (Most Recent):  Temp: 97.4 °F (36.3 °C) (02/24/17 1500)  Pulse: 84 (02/24/17 1500)  Resp: 18 (02/24/17 1500)  BP: (!) 101/44 (02/24/17 1500)  SpO2: (!) 93 % (02/24/17 1500) Vital Signs (24h Range):  Temp:  [97.4 °F (36.3 °C)-98.2 °F (36.8 °C)] 97.4 °F (36.3 °C)  Pulse:  [78-92] 84  Resp:  [16-20] 18  SpO2:  [92 %-100 %] 93 %  BP: (101-119)/(44-81) 101/44     Weight: 74.4 kg (164 lb)  Body mass index is 30 kg/(m^2).    Intake/Output Summary (Last 24 hours) at 02/24/17 1729  Last data filed at 02/24/17 0623   Gross per 24 hour   Intake              100 ml   Output                0 ml   Net              100 ml      Physical Exam   Constitutional: She is oriented to person, place, and time. She appears well-developed. No distress.   HENT:   Head: Normocephalic and atraumatic.   Mouth/Throat: Oropharynx is clear and moist.   Eyes: Conjunctivae are normal. Pupils are equal, round, and reactive to light. Right eye exhibits no discharge. Left eye exhibits no discharge. No scleral icterus.   Neck: Normal range of motion. Neck supple. No JVD present. No thyromegaly present.   Cardiovascular: Normal rate and regular rhythm.  Exam reveals no gallop and no friction rub.    No murmur heard.  Pulmonary/Chest: Effort normal and breath sounds normal. No respiratory distress. She has no wheezes. She has no rales. She exhibits no tenderness.   Abdominal: Soft. Bowel sounds are normal. She exhibits no distension. There is no tenderness. There is no rebound and no guarding.   Musculoskeletal: She exhibits edema and tenderness.   Lymphadenopathy:     She has no cervical adenopathy.   Neurological: She is alert and oriented to person, place, and time. She displays  normal reflexes. No cranial nerve deficit.   Skin: Skin is warm and dry. She is not diaphoretic. There is erythema.   Right foot-intact blister with scabbed area in middle\and purulent appearing material under blister. minimall TTP    Psychiatric: She has a normal mood and affect. Her behavior is normal.   Vitals reviewed.      Significant Labs: All pertinent labs within the past 24 hours have been reviewed.    Significant Imaging: I have reviewed and interpreted all pertinent imaging results/findings within the past 24 hours.

## 2017-02-24 NOTE — PLAN OF CARE
02/24/17 0750   Patient Assessment/Suction   Level of Consciousness (AVPU) alert   Respiratory Effort Normal;Unlabored   All Lung Fields Breath Sounds diminished   PRE-TX-O2-ETCO2   O2 Device (Oxygen Therapy) nasal cannula   $ Is the patient on Oxygen? Yes   Flow (L/min) 2   Oxygen Concentration (%) 28   SpO2 97 %   Pulse Oximetry Type Intermittent   Pulse 78   Resp 16   Positioning HOB elevated 30 degrees   Aerosol Therapy   $ Aerosol Therapy Charges PRN treatment not required   Inhaler   $ Inhaler Charges MDI (Metered Dose Inahler) Treatment   Respiratory Treatment Status given   SVN/Inhaler Treatment Route mouthpiece   Patient Tolerance good   Post-Treatment   Post-treatment Heart Rate (beats/min) 78   Post-treatment Resp Rate (breaths/min) 16   All Fields Breath Sounds aeration increased   Ready to Wean/Extubation Screen   FIO2<60 (chart decimal) 0.28

## 2017-02-24 NOTE — ASSESSMENT & PLAN NOTE
Progressively getting worse  Associated with weakness and multiple falls  Consult PT and OT  Pt would greatly benefit from SNF for therapy-consult to SW   Will place PPD in anticipation  Consult dietician

## 2017-02-24 NOTE — CONSULTS
[facility]  Adult Nutrition  Consult Note    SUMMARY     Recommendations    Recommendation/Intervention: 1) Continue cardiac diet 2) Add Boost Plus with meals 3) Consider adding calcium with vitamin D re pt concern for bone health 4) Intervention: Educated pt on high protein foods and supplements   Goals: 1) Meal intake >= 75% 2) Utilize supplement  Nutrition Goal Status: new  Communication of RD Recs:  (sticky note)    1. Foot ulcer, left    2. Right shoulder pain    3. Right arm pain    4. Right axillary hidradenitis      Past Medical History:   Diagnosis Date    Acid reflux     Family history of breast cancer     Family history of diabetes mellitus     Intrinsic asthma     Psoriasis      Continuum of Care Plan    Referral to Outpatient Services:  (Cardiac with high protein supplement such as Boost Plus)    Reason for Assessment    Reason for Assessment: physician consult   Interdisciplinary Rounds: did not attend   General Information Comments: Pt reports good appetite with 60% meal intake so far at breakfast this am.  Notes concern re bone health and has questions about supplementation with calcium.      Nutrition Prescription Ordered    Current Diet Order: Cardiac      Evaluation of Received Nutrients/Fluid Intake     Fluid Required: meeting needs  Comments: Recent admit, has been NPO, received 1st meal at breakfast today.  Tolerance: tolerating     Nutrition Risk Screen     Nutrition Risk Screen: no indicators present    Nutrition/Diet History    Patient Reported Diet/Restrictions/Preferences: general   Food Preferences: fish, eggs, tomatotoes, broccoli   Supplemental Drinks or Food Habits:  (Pedialyte re concern about electrolytes)     Labs/Tests/Procedures/Meds    Diagnostic Test/Procedure Review: reviewed, pertinent (wound care)  Pertinent Labs Reviewed: reviewed, pertinent    No results for input(s): POCTGLUCOSE in the last 24 hours.  Lab Results   Component Value Date    CRP 31.1  (H) 02/23/2017     Lab Results   Component Value Date    ALBUMIN 3.3 (L) 02/23/2017     Pertinent Medications Reviewed: reviewed, pertinent      Scheduled Meds:   aspirin  81 mg Oral Daily    atorvastatin  10 mg Oral QHS    ceftaroline fosamil  600 mg Intravenous Q12H    clonazePAM  0.5 mg Oral Daily    enoxaparin  40 mg Subcutaneous Daily    fluticasone-vilanterol  1 puff Inhalation Daily    propranolol  20 mg Oral BID     Continuous Infusions:   PRN Meds:.albuterol-ipratropium 2.5mg-0.5mg/3mL, hydrocodone-apap 2.5-108 MG/5 ML, insulin aspart, ondansetron      Physical Findings       Tubes:  (NC)     Skin:  (bliateral foot ulcers)    Anthropometrics    Height Method: Stated  Height (inches): 62.01 in  Weight Method: Bed Scale  Weight (kg): 74.4 kg   Ideal Body Weight (IBW), Female: 110.05 lb   % Ideal Body Weight, Female (lb): 149.04 lb  BMI (kg/m2): 29.99  BMI Grade: 25 - 29.9 - overweight      Anthropometrics (Special Considerations)       Estimated/Assessed Needs    Weight Used For Calorie Calculations: 74.4 kg (164 lb 0.4 oz)   Height (cm): 157.5 cm    Energy Needs:  MSJ with AF 1.2-1.5 per recs for wound care.  3434-2396 calories   RMR (Flagler-St. Jeor Equation): 1153.35   Weight Used For Protein Calculations: 74.4 kg (164 lb 0.4 oz)   1.2 gm Protein (gm): 89.47 and 1.5 gm Protein (gm): 111.83  Fluid Need Method:  (1 ml/kcal or per MD rec)       Monitor and Evaluation    Food and Nutrient Intake: energy intake  Food and Nutrient Adminstration: diet order   Anthropometric Measurements: weight, weight change  Biochemical Data, Medical Tests and Procedures: glucose/endocrine profile, inflammatory profile  Nutrition-Focused Physical Findings: skin     Nutrition Diagnosis    Increased needs r/t healing AEB presence of bilateral foot ulcers    Nutrition Risk    Level of Risk:  (2 x weekly)    Nutrition Follow-Up    RD Follow-up?: Yes 2/28/17    Assessment and Plan    No new Assessment & Plan notes have been  filed under this hospital service since the last note was generated.  Service: Nutrition

## 2017-02-24 NOTE — ED NOTES
Pt presents to Ed after falling twice today. C/O pain to right shoulder and right hip. States she is weak, but always weak. States she thinks she is tired and that's why she fell. Complains that no one visits her at home. Patient has chronic wounds to bilateral feet. Ambulates with walker at home. AAO x3. Pain with ROM to right shoulder. +radial pulses.

## 2017-02-24 NOTE — ED PROVIDER NOTES
Encounter Date: 2/23/2017    SCRIBE #1 NOTE: I, Kofi Wright, am scribing for, and in the presence of,  Dr. Henderson. I have scribed the entire note.       History     Chief Complaint   Patient presents with    Hip Pain     fell x 2 today and c/o pain to right arm/shoulder and right hip/leg; states unable to raise right arm     Review of patient's allergies indicates:   Allergen Reactions    Epinephrine      Seizure like activity      Pcn [penicillins]      HPI Comments: 02/23/2017  9:29 PM     Chief Complaint: fall       The patient is a 84 y.o. female who is presenting s/p fall that occurred earlier this evening. She fell due to weakness in her legs and states her muscles gave out while she was walking. She fell onto a hard wood floor, in her bedroom on her right hip and right shoulder. She currently complains of mild right shoulder pain, which is non radiating and exacerbated by movement. She hit the back of her head slightly, but denies HA. Pt has a hx of falling, due to weakness. She does not report alleviating or exacerbating factors. There are no other complaints at this time. She has a past medical history of Acid reflux; Family history of breast cancer; Family history of diabetes mellitus; Intrinsic asthma; and Psoriasis.  has a past surgical history that includes Total abdominal hysterectomy w/ bilateral salpingoophorectomy (6/2012); TONSILLECTOMY, ADENOIDECTOMY (as child); Dilation and curettage of uterus; and Umbilical hernia repair.          The history is provided by the patient.     Past Medical History:   Diagnosis Date    Acid reflux     Family history of breast cancer     Family history of diabetes mellitus     Intrinsic asthma     Psoriasis      Past Surgical History:   Procedure Laterality Date    DILATION AND CURETTAGE OF UTERUS      x2    TONSILLECTOMY, ADENOIDECTOMY  as child    TOTAL ABDOMINAL HYSTERECTOMY W/ BILATERAL SALPINGOOPHORECTOMY  6/2012    UMBILICAL HERNIA REPAIR        Family History   Problem Relation Age of Onset    Cancer Mother      pancreas    Heart attack Father     Cancer Sister      breast    Diabetes Brother      Social History   Substance Use Topics    Smoking status: Never Smoker    Smokeless tobacco: Never Used    Alcohol use No     Review of Systems   Constitutional: Negative for fever.   HENT: Negative for sore throat.    Eyes: Negative for visual disturbance.   Respiratory: Negative for shortness of breath.    Cardiovascular: Negative for chest pain.   Gastrointestinal: Negative for nausea.   Genitourinary: Negative for dysuria.   Musculoskeletal: Positive for arthralgias (hip pain and shoulder pain). Negative for back pain.   Skin: Negative for rash.   Neurological: Negative for weakness.   Hematological: Does not bruise/bleed easily.   Psychiatric/Behavioral: Negative for confusion.       Physical Exam   Initial Vitals   BP Pulse Resp Temp SpO2   02/23/17 2055 02/23/17 2055 02/23/17 2055 02/23/17 2055 02/23/17 2052   109/58 92 18 98.2 °F (36.8 °C) 95 %     Physical Exam    Nursing note and vitals reviewed.  Constitutional: She appears well-nourished. No distress.   HENT:   Head: Normocephalic and atraumatic.   Eyes: Conjunctivae are normal.   Cardiovascular: Normal rate, regular rhythm, normal heart sounds and intact distal pulses. Exam reveals no gallop and no friction rub.    No murmur heard.  Pulmonary/Chest: She has wheezes.   Faint expiratory wheezing   Abdominal: Soft. Bowel sounds are normal. She exhibits distension. She exhibits no mass. There is no tenderness. There is no rebound and no guarding.   Abdominal distension.    Musculoskeletal:   2+ pitting edema, with no limb shortening and deformity of toes bilaterally. She has erythema in the bilateral lower extremities from the distal to the lower legs. No pain in the hip with internal or external rotation. She has tenderness over the mid right humerus and can not raise arm over head secondary  to pain, she has full passive ROM. She is tender over the distal right clavicle without deformity. Chronic neck pain with no midline tenderness to palpation.    Neurological:   Baseline tremor at rest.    Skin:   Foul smelling ulcer in the right food with surrounding erythema.    Psychiatric: She has a normal mood and affect.         ED Course   Procedures  Labs Reviewed   CBC W/ AUTO DIFFERENTIAL - Abnormal; Notable for the following:        Result Value    MCHC 31.7 (*)     RDW 14.8 (*)     MPV 8.5 (*)     Lymph # 0.6 (*)     Gran% 79.6 (*)     Lymph% 10.0 (*)     All other components within normal limits   COMPREHENSIVE METABOLIC PANEL - Abnormal; Notable for the following:     Chloride 94 (*)     CO2 32 (*)     Glucose 147 (*)     Albumin 3.3 (*)     AST 41 (*)     All other components within normal limits   URINALYSIS - Abnormal; Notable for the following:     Appearance, UA Cloudy (*)     Protein, UA Trace (*)     Ketones, UA 1+ (*)     Bilirubin (UA) 1+ (*)     All other components within normal limits   SEDIMENTATION RATE, MANUAL - Abnormal; Notable for the following:     Sed Rate 37 (*)     All other components within normal limits   C-REACTIVE PROTEIN - Abnormal; Notable for the following:     CRP 31.1 (*)     All other components within normal limits   PHOSPHORUS - Abnormal; Notable for the following:     Phosphorus 2.3 (*)     All other components within normal limits   URINALYSIS MICROSCOPIC - Abnormal; Notable for the following:     Amorphous, UA Many (*)     All other components within normal limits   TSH   MAGNESIUM          X-Rays:   Independently Interpreted Readings:   Other Readings:  X-ray left foot: No evidence of decortication or osteomyelitis.  No foreign body.    Medical Decision Making:   History:   Old Medical Records: I decided to obtain old medical records.  Initial Assessment:   I decided to obtain and review/summarized old medical record.     Jennifer Diaz is a 84 y.o. female  who presents to the Emergency Department with frequent falls and left foot ulcer.  My overall impression is left foot ulcer with cellulitis, frequent falls.  I do not think the patient has sepsis, osteomyelitis, fracture, dislocation.  I feel the patient needs to be admitted to Hospitalist who i spoke with and agrees to admit the patient.  Will need PT eval, IV abx.              Scribe Attestation:   Scribe #1: I performed the above scribed service and the documentation accurately describes the services I performed. I attest to the accuracy of the note.    Attending Attestation:           Physician Attestation for Scribe:  Physician Attestation Statement for Scribe #1: I, Dr. Henderson, reviewed documentation, as scribed by Annette Wright in my presence, and it is both accurate and complete.                 ED Course     Clinical Impression:   Diagnoses of Foot ulcer, left, Right shoulder pain, Right arm pain, Right axillary hidradenitis, and Bilateral lower leg cellulitis were pertinent to this visit.          Chaz Henderson MD  02/24/17 2041

## 2017-02-24 NOTE — ED NOTES
Had patient sit on BSC for about 20 minutes to urinate and have a BM. Pt unable to have a BM at this time. Placed pt back in bed. Placed oxygen at 2L on patient cause she said she was having a hard time breathing. Will monitor

## 2017-02-24 NOTE — PROGRESS NOTES
[facility] Hospital Medicine  Progress Note    Patient Name: Jennifer Diaz  MRN: [id number]  Patient Class: OP- Observation   Admission Date: 2/23/2017  Length of Stay: 0 days  Attending Physician: Jocelyn Lindquist MD  Primary Care Provider: Catrachito Mart MD        Subjective:     Principal Problem:Bilateral lower leg cellulitis    HPI:  Ms. Diaz is an 83 yo female admitted to the services of [facility] medicine via the ER for right foot wound, bilateral lower leg cellulitis and multiple falls. Pt states that she started falling on February 1st. Pt is confident that she has not passed out causing any 3 of the falls so far this month. Pt states that her legs have been swollen since starting a medicine for her bladder. States that legs have been red for only about two weeks. Pt is not sure how long she has had a sore on the right foot. Pt lives alone. Son lives nearby but is only able to visit weekly due to work. History of asthma, uses an inhaler at home. Other past medical history as below.      Hospital Course:  Falls-eval by PT/OT. No arrythmias or syncopal episodes. Consult  for snf placement.     Cellulitis-on teflaro; improving redness.   Right foot wound- intact blister with purulent dc and scab. Orthopedic consulted, I and D and debrided with minimal purulent material expressed and no odor. Wound care ordered.   Blood culture    Asthma-stable; prn nebulizer treatments.       Interval History: no complaints.   Pt very demanding -requested I call the orthopedic dr  To return as she needed to ask him more questions.     Review of Systems   Respiratory: Negative.    Cardiovascular: Negative.    Gastrointestinal: Negative.      Objective:     Vital Signs (Most Recent):  Temp: 97.4 °F (36.3 °C) (02/24/17 1500)  Pulse: 84 (02/24/17 1500)  Resp: 18 (02/24/17 1500)  BP: (!) 101/44 (02/24/17 1500)  SpO2: (!) 93 % (02/24/17 1500) Vital Signs (24h Range):  Temp:  [97.4 °F (36.3 °C)-98.2 °F (36.8  °C)] 97.4 °F (36.3 °C)  Pulse:  [78-92] 84  Resp:  [16-20] 18  SpO2:  [92 %-100 %] 93 %  BP: (101-119)/(44-81) 101/44     Weight: 74.4 kg (164 lb)  Body mass index is 30 kg/(m^2).    Intake/Output Summary (Last 24 hours) at 02/24/17 2751  Last data filed at 02/24/17 0623   Gross per 24 hour   Intake              100 ml   Output                0 ml   Net              100 ml      Physical Exam   Constitutional: She is oriented to person, place, and time. She appears well-developed. No distress.   HENT:   Head: Normocephalic and atraumatic.   Mouth/Throat: Oropharynx is clear and moist.   Eyes: Conjunctivae are normal. Pupils are equal, round, and reactive to light. Right eye exhibits no discharge. Left eye exhibits no discharge. No scleral icterus.   Neck: Normal range of motion. Neck supple. No JVD present. No thyromegaly present.   Cardiovascular: Normal rate and regular rhythm.  Exam reveals no gallop and no friction rub.    No murmur heard.  Pulmonary/Chest: Effort normal and breath sounds normal. No respiratory distress. She has no wheezes. She has no rales. She exhibits no tenderness.   Abdominal: Soft. Bowel sounds are normal. She exhibits no distension. There is no tenderness. There is no rebound and no guarding.   Musculoskeletal: She exhibits edema and tenderness.   Lymphadenopathy:     She has no cervical adenopathy.   Neurological: She is alert and oriented to person, place, and time. She displays normal reflexes. No cranial nerve deficit.   Skin: Skin is warm and dry. She is not diaphoretic. There is erythema.   Right foot-intact blister with scabbed area in middle\and purulent appearing material under blister. minimall TTP    Psychiatric: She has a normal mood and affect. Her behavior is normal.   Vitals reviewed.      Significant Labs: All pertinent labs within the past 24 hours have been reviewed.    Significant Imaging: I have reviewed and interpreted all pertinent imaging results/findings within the  past 24 hours.    Assessment/Plan:      * Bilateral lower leg cellulitis  Acute  US to evaluate for DVT  Empiric coverage with Teflaro  Check CRP        Foot ulcer, left  As above, start Teflaro  Consult podiatry-debrided by ortho  Consult wound care      Multiple falls  See above       Debility  Progressively getting worse  Associated with weakness and multiple falls  Consult PT and OT  Pt would greatly benefit from SNF for therapy-consult to SW   Will place PPD in anticipation  Consult dietician       VTE Risk Mitigation         Ordered     enoxaparin injection 40 mg  Daily     Route:  Subcutaneous        02/24/17 0226     Medium Risk of VTE  Once      02/24/17 0226          oJcelyn Lindquist MD  Department of Hospital Medicine   [facility]

## 2017-02-24 NOTE — PLAN OF CARE
Bilateral chronic toe and foot deformities. Now has bilateral skin redness and lesion plantar aspect left foot. States she went to see Dr Chris yesterday, but he was not in the office.  Recommend podiatry ASAP, wash lesion with wound cleanser and cover with mepilex bordered gauze daily.

## 2017-02-24 NOTE — ASSESSMENT & PLAN NOTE
Despite pt feeling short of breath, there is no wheezing  Most likely there is an anxiety component involved  duonebs

## 2017-02-24 NOTE — PROGRESS NOTES
Called and spoke with Dr. Cross who stated that he was going out of town and is not able to take consults at this time.   Also called and left message for Dr. Noel to see if he could see pt, no answer, left message.

## 2017-02-24 NOTE — NURSING
Spoke with HARIS Morrison NP about pt being SOB and requesting resp tx and pain meds. Awaiting new orders. Updated pt of plan of care. Will continue to monitor.

## 2017-02-25 PROCEDURE — 63600175 PHARM REV CODE 636 W HCPCS: Performed by: HOSPITALIST

## 2017-02-25 PROCEDURE — 94640 AIRWAY INHALATION TREATMENT: CPT

## 2017-02-25 PROCEDURE — 25000242 PHARM REV CODE 250 ALT 637 W/ HCPCS: Performed by: HOSPITALIST

## 2017-02-25 PROCEDURE — 99900035 HC TECH TIME PER 15 MIN (STAT)

## 2017-02-25 PROCEDURE — 0H9NXZZ DRAINAGE OF LEFT FOOT SKIN, EXTERNAL APPROACH: ICD-10-PCS | Performed by: ORTHOPAEDIC SURGERY

## 2017-02-25 PROCEDURE — 25000003 PHARM REV CODE 250: Performed by: NURSE PRACTITIONER

## 2017-02-25 PROCEDURE — 63600175 PHARM REV CODE 636 W HCPCS: Performed by: NURSE PRACTITIONER

## 2017-02-25 PROCEDURE — 27000221 HC OXYGEN, UP TO 24 HOURS

## 2017-02-25 PROCEDURE — 11000001 HC ACUTE MED/SURG PRIVATE ROOM

## 2017-02-25 PROCEDURE — 25000003 PHARM REV CODE 250: Performed by: HOSPITALIST

## 2017-02-25 PROCEDURE — 94761 N-INVAS EAR/PLS OXIMETRY MLT: CPT

## 2017-02-25 PROCEDURE — 97110 THERAPEUTIC EXERCISES: CPT

## 2017-02-25 RX ORDER — DOCUSATE SODIUM 100 MG/1
100 CAPSULE, LIQUID FILLED ORAL 2 TIMES DAILY
Status: DISCONTINUED | OUTPATIENT
Start: 2017-02-25 | End: 2017-03-01 | Stop reason: HOSPADM

## 2017-02-25 RX ORDER — BISACODYL 10 MG
10 SUPPOSITORY, RECTAL RECTAL DAILY PRN
Status: DISCONTINUED | OUTPATIENT
Start: 2017-02-25 | End: 2017-03-01

## 2017-02-25 RX ORDER — KETOROLAC TROMETHAMINE 30 MG/ML
15 INJECTION, SOLUTION INTRAMUSCULAR; INTRAVENOUS ONCE
Status: COMPLETED | OUTPATIENT
Start: 2017-02-25 | End: 2017-02-25

## 2017-02-25 RX ADMIN — DOCUSATE SODIUM 100 MG: 100 CAPSULE, LIQUID FILLED ORAL at 01:02

## 2017-02-25 RX ADMIN — FLUTICASONE FUROATE AND VILANTEROL TRIFENATATE 1 PUFF: 100; 25 POWDER RESPIRATORY (INHALATION) at 07:02

## 2017-02-25 RX ADMIN — OXYBUTYNIN CHLORIDE 5 MG: 5 TABLET ORAL at 09:02

## 2017-02-25 RX ADMIN — KETOROLAC TROMETHAMINE 15 MG: 30 INJECTION, SOLUTION INTRAMUSCULAR at 01:02

## 2017-02-25 RX ADMIN — ATORVASTATIN CALCIUM 10 MG: 10 TABLET, FILM COATED ORAL at 08:02

## 2017-02-25 RX ADMIN — DOCUSATE SODIUM 100 MG: 100 CAPSULE, LIQUID FILLED ORAL at 08:02

## 2017-02-25 RX ADMIN — CEFTAROLINE FOSAMIL 600 MG: 600 POWDER, FOR SOLUTION INTRAVENOUS at 03:02

## 2017-02-25 RX ADMIN — CEFTAROLINE FOSAMIL 600 MG: 600 POWDER, FOR SOLUTION INTRAVENOUS at 02:02

## 2017-02-25 RX ADMIN — CLONAZEPAM 0.5 MG: 0.5 TABLET ORAL at 08:02

## 2017-02-25 RX ADMIN — OXYBUTYNIN CHLORIDE 5 MG: 5 TABLET ORAL at 08:02

## 2017-02-25 RX ADMIN — FLUTICASONE PROPIONATE 2 SPRAY: 50 SPRAY, METERED NASAL at 09:02

## 2017-02-25 RX ADMIN — IPRATROPIUM BROMIDE AND ALBUTEROL SULFATE 3 ML: .5; 3 SOLUTION RESPIRATORY (INHALATION) at 04:02

## 2017-02-25 RX ADMIN — ASPIRIN 81 MG: 81 TABLET, COATED ORAL at 09:02

## 2017-02-25 RX ADMIN — ENOXAPARIN SODIUM 40 MG: 100 INJECTION SUBCUTANEOUS at 12:02

## 2017-02-25 NOTE — PLAN OF CARE
Problem: Patient Care Overview  Goal: Plan of Care Review  Outcome: Ongoing (interventions implemented as appropriate)  Pt vital signs stable at this time. Lt Fa IV infusing antibiotics as ordered without difficulty. Dsg to Lt foot CDI, pt is s/p debridement per Dr Galvan 2/24/17. O2 2L NC in use. Pt has multiple requests, demanding at times, calls the nurses station quite often. Call light within pt reach, pt instructed to call staff for any needed assistance, safety maintained. Pt denies needs/concerns at this moment.

## 2017-02-25 NOTE — PLAN OF CARE
Problem: Physical Therapy Goal  Goal: Physical Therapy Goal  Goals to be met by: 3/04/2017     Patient will increase functional independence with mobility by performin). Supine to sit with Modified [place]  2). Sit to supine with Modified [place]  3). Sit to stand transfer with Modified [place]  4). Gait x > 50 feet with Modified [place] using Rolling Walker.    Outcome: Ongoing (interventions implemented as appropriate)  Pt did not want to ambulate out in hallway.  Participated in standing and sitting therex.  Ambulated 4 steps for/back.

## 2017-02-25 NOTE — PROGRESS NOTES
02/25/17 0457   Patient Assessment/Suction   Level of Consciousness (AVPU) alert   All Lung Fields Breath Sounds diminished   PRE-TX-O2-ETCO2   O2 Device (Oxygen Therapy) nasal cannula   $ Is the patient on Oxygen? Yes   Flow (L/min) 2   SpO2 98 %   Pulse 66   Resp 16   Aerosol Therapy   $ Aerosol Therapy Charges Aerosol Treatment   Respiratory Treatment Status given   SVN/Inhaler Treatment Route mask   Position During Treatment HOB at 30-45 degrees   Patient Tolerance good   Post-Treatment   Post-treatment Heart Rate (beats/min) 65   Post-treatment Resp Rate (breaths/min) 16   All Fields Breath Sounds unchanged

## 2017-02-25 NOTE — PLAN OF CARE
Problem: Patient Care Overview  Goal: Plan of Care Review  Outcome: Revised  Pt aaox 4. Pt able to verbalize needs/pain/wants. Pt poc reviewed with pt and pt stated understanding. Pt c/o pain and given pain meds as ordered. Pt denies sob at this time. Pt has dressing on left ft wound. Pt ambulated to BS. Pt given iv abt as ordered.pt safety maintained thus far during my shift.  Pt is resting in bed with bed lowered, call light within reach, and will continue to monitor during my shift.

## 2017-02-25 NOTE — PT/OT/SLP PROGRESS
Physical Therapy  Treatment    Jennifer Diaz   MRN: [id number]   Admitting Diagnosis: Bilateral lower leg cellulitis    PT Received On: 02/25/17  PT Start Time: 0902     PT Stop Time: 0915    PT Total Time (min): 13 min       Billable Minutes:  Therapeutic Exercise 13    Treatment Type: Treatment  PT/PTA: PTA     PTA Visit Number: 1       General Precautions: Standard, fall  Orthopedic Precautions: N/A   Braces:           Subjective:  Communicated with nurse Brandie prior to session.  Agreeable to tx.  Pt did not want to ambulate in hallway.                        Objective:   Patient found with: peripheral IV, telemetry, oxygen    Functional Mobility:  Bed Mobility:   Rolling/Turning Right: Supervision  Supine to Sit: Supervision  Sit to Supine: Minimum Assistance    Transfers:  Sit <> Stand Assistance: Contact Guard Assistance  Sit <> Stand Assistive Device: Rolling Walker    Gait:   Gait Distance: 4 steps forward/backward  Assistance 1: Contact Guard Assistance  Gait Assistive Device: Rolling walker  Gait Pattern: 3-point gait  Gait Deviation(s): decreased deya, decreased velocity of limb motion, increased time in double stance, decreased stride length    Balance:   Static Sit: FAIR+: Able to take MINIMAL challenges from all directions  Dynamic Sit: FAIR+: Maintains balance through MINIMAL excursions of active trunk motion  Static Stand: FAIR: Maintains without assist but unable to take challenges  Dynamic stand: FAIR: Needs CONTACT GUARD during gait     Therapeutic Activities and Exercises:  Standing marches x 10 B, seated te: laq, marches, hip add/abd, ap x 10 B     AM-PAC 6 CLICK MOBILITY  How much help from another person does this patient currently need?   1 = Unable, Total/Dependent Assistance  2 = A lot, Maximum/Moderate Assistance  3 = A little, Minimum/Contact Guard/Supervision  4 = None, Modified [place]/Independent         AM-PAC Raw Score CMS G-Code Modifier Level of Impairment  Assistance   6 % Total / Unable   7 - 9 CM 80 - 100% Maximal Assist   10 - 14 CL 60 - 80% Moderate Assist   15 - 19 CK 40 - 60% Moderate Assist   20 - 22 CJ 20 - 40% Minimal Assist   23 CI 1-20% SBA / CGA   24 CH 0% Independent/ Mod I     Patient left supine with all lines intact, call button in reach and nurse present.    Assessment:  Jennifer Diaz is a 84 y.o. female with a medical diagnosis of Bilateral lower leg cellulitis and presents with weakness.    Rehab identified problem list/impairments: Rehab identified problem list/impairments: weakness, impaired endurance, impaired functional mobilty, gait instability, impaired balance, decreased lower extremity function, pain    Activity tolerance: Good    Discharge recommendations:       Barriers to discharge:      Equipment recommendations:       GOALS:   Physical Therapy Goals        Problem: Physical Therapy Goal    Goal Priority Disciplines Outcome Goal Variances Interventions   Physical Therapy Goal     PT/OT, PT Ongoing (interventions implemented as appropriate)     Description:  Goals to be met by: 3/04/2017     Patient will increase functional independence with mobility by performin). Supine to sit with Modified [place]  2). Sit to supine with Modified [place]  3). Sit to stand transfer with Modified [place]  4). Gait  x  > 50 feet with Modified [place] using Rolling Walker.                 PLAN:    Patient to be seen 6 x/week  to address the above listed problems via gait training, therapeutic activities, therapeutic exercises  Plan of Care expires: 17  Plan of Care reviewed with: patient         Kerry LOPEZ Willie, PTA  2017

## 2017-02-25 NOTE — PROGRESS NOTES
02/24/17 2110   Patient Assessment/Suction   Level of Consciousness (AVPU) alert   PRE-TX-O2-ETCO2   O2 Device (Oxygen Therapy) nasal cannula   $ Is the patient on Oxygen? Yes   Flow (L/min) 2   SpO2 96 %   Aerosol Therapy   $ Aerosol Therapy Charges PRN treatment not required

## 2017-02-25 NOTE — PLAN OF CARE
Problem: Patient Care Overview  Goal: Plan of Care Review  Outcome: Revised  Pt aao x4. Pt able to verbalize needs/pain/wants. Pt poc reviewed with pt and pt stated understanding. Pt denies pain and sob at this time. Pt up in chair today with pt. Pt safety maintained thus far this shift. Pt has left foot ulcer that is covered with dressing and no drainage. Pt given iv abt as ordered. Pt is resting in bed, with bed lowered, call light within reach, and will continue to monitor.

## 2017-02-25 NOTE — PLAN OF CARE
02/25/17 0754   Patient Assessment/Suction   Level of Consciousness (AVPU) alert   Respiratory Effort Normal;Unlabored   All Lung Fields Breath Sounds diminished;clear   PRE-TX-O2-ETCO2   O2 Device (Oxygen Therapy) nasal cannula   $ Is the patient on Oxygen? Yes   Flow (L/min) 2   Oxygen Concentration (%) 28   SpO2 98 %   Pulse Oximetry Type Intermittent   Pulse 68   Resp 16   Positioning HOB elevated 30 degrees   Aerosol Therapy   $ Aerosol Therapy Charges PRN treatment not required   Inhaler   $ Inhaler Charges MDI (Metered Dose [place]ahler) Treatment   Respiratory Treatment Status given   SVN/Inhaler Treatment Route mouthpiece   Patient Tolerance good   Post-Treatment   Post-treatment Heart Rate (beats/min) 68   Post-treatment Resp Rate (breaths/min) 16   All Fields Breath Sounds unchanged   Ready to Wean/Extubation Screen   FIO2<60 (chart decimal) 0.28

## 2017-02-26 LAB — TB INDURATION 48 - 72 HR READ: 0 MM

## 2017-02-26 PROCEDURE — 94761 N-INVAS EAR/PLS OXIMETRY MLT: CPT

## 2017-02-26 PROCEDURE — 99900035 HC TECH TIME PER 15 MIN (STAT)

## 2017-02-26 PROCEDURE — 94640 AIRWAY INHALATION TREATMENT: CPT

## 2017-02-26 PROCEDURE — 25000003 PHARM REV CODE 250: Performed by: HOSPITALIST

## 2017-02-26 PROCEDURE — 27000221 HC OXYGEN, UP TO 24 HOURS

## 2017-02-26 PROCEDURE — 25000003 PHARM REV CODE 250: Performed by: NURSE PRACTITIONER

## 2017-02-26 PROCEDURE — 63600175 PHARM REV CODE 636 W HCPCS: Performed by: NURSE PRACTITIONER

## 2017-02-26 PROCEDURE — 11000001 HC ACUTE MED/SURG PRIVATE ROOM

## 2017-02-26 RX ORDER — LACTULOSE 10 G/15ML
20 SOLUTION ORAL 2 TIMES DAILY
Status: COMPLETED | OUTPATIENT
Start: 2017-02-26 | End: 2017-02-27

## 2017-02-26 RX ORDER — TRIAMCINOLONE ACETONIDE 1 MG/G
OINTMENT TOPICAL 2 TIMES DAILY
Status: DISCONTINUED | OUTPATIENT
Start: 2017-02-26 | End: 2017-03-01 | Stop reason: HOSPADM

## 2017-02-26 RX ORDER — CEFUROXIME AXETIL 250 MG/1
250 TABLET ORAL EVERY 12 HOURS
Status: DISCONTINUED | OUTPATIENT
Start: 2017-02-27 | End: 2017-03-01 | Stop reason: HOSPADM

## 2017-02-26 RX ADMIN — FLUTICASONE FUROATE AND VILANTEROL TRIFENATATE 1 PUFF: 100; 25 POWDER RESPIRATORY (INHALATION) at 07:02

## 2017-02-26 RX ADMIN — PROPRANOLOL HYDROCHLORIDE 20 MG: 20 TABLET ORAL at 09:02

## 2017-02-26 RX ADMIN — FLUTICASONE PROPIONATE 2 SPRAY: 50 SPRAY, METERED NASAL at 09:02

## 2017-02-26 RX ADMIN — DOCUSATE SODIUM 100 MG: 100 CAPSULE, LIQUID FILLED ORAL at 09:02

## 2017-02-26 RX ADMIN — HYDROCODONE BITARTRATE AND ACETAMINOPHEN 10 ML: 2.5; 108 SOLUTION ORAL at 07:02

## 2017-02-26 RX ADMIN — ENOXAPARIN SODIUM 40 MG: 100 INJECTION SUBCUTANEOUS at 12:02

## 2017-02-26 RX ADMIN — CEFTAROLINE FOSAMIL 600 MG: 600 POWDER, FOR SOLUTION INTRAVENOUS at 04:02

## 2017-02-26 RX ADMIN — OXYBUTYNIN CHLORIDE 5 MG: 5 TABLET ORAL at 09:02

## 2017-02-26 RX ADMIN — LACTULOSE 20 G: 10 SOLUTION ORAL at 09:02

## 2017-02-26 RX ADMIN — ATORVASTATIN CALCIUM 10 MG: 10 TABLET, FILM COATED ORAL at 09:02

## 2017-02-26 RX ADMIN — ASPIRIN 81 MG: 81 TABLET, COATED ORAL at 09:02

## 2017-02-26 RX ADMIN — CLONAZEPAM 0.5 MG: 0.5 TABLET ORAL at 09:02

## 2017-02-26 RX ADMIN — TRIAMCINOLONE ACETONIDE: 1 OINTMENT TOPICAL at 09:02

## 2017-02-26 NOTE — PROGRESS NOTES
Pt called asking for more pillows to be placed behind her back after just being repositioned, myself and Ray went to room to assist pt and she stated she was taking a phone call and we needed to come back when she was done.

## 2017-02-26 NOTE — PLAN OF CARE
02/26/17 0744   Patient Assessment/Suction   Level of Consciousness (AVPU) alert   Respiratory Effort Normal;Unlabored   Expansion/Accessory Muscles/Retractions no use of accessory muscles   All Lung Fields Breath Sounds diminished   PRE-TX-O2-ETCO2   O2 Device (Oxygen Therapy) nasal cannula   $ Is the patient on Oxygen? Yes   Flow (L/min) 2   Oxygen Concentration (%) 28   SpO2 95 %   Pulse Oximetry Type Intermittent   $ Pulse Oximetry - Multiple Charge Pulse Oximetry - Multiple   Pulse (!) 59   Resp 16   Positioning Sitting in bed   Aerosol Therapy   $ Aerosol Therapy Charges PRN treatment not required   Inhaler   $ Inhaler Charges MDI (Metered Dose Inahler) Treatment   Respiratory Treatment Status given   SVN/Inhaler Treatment Route mouthpiece   Patient Tolerance good   Post-Treatment   Post-treatment Heart Rate (beats/min) 60   Post-treatment Resp Rate (breaths/min) 16   All Fields Breath Sounds unchanged   Ready to Wean/Extubation Screen   FIO2<60 (chart decimal) 0.28

## 2017-02-26 NOTE — PLAN OF CARE
Problem: Patient Care Overview  Goal: Plan of Care Review  Outcome: Ongoing (interventions implemented as appropriate)  Pt remained free of injury, able to call for assistance, very attention seeking, calls every 20 minutes even after insuring pt that she would be rounded on hourly, pt acts helpless and like she cannot move at all in the bed, antibiotics ordered, Dr. Galvan has signed off on the case, will continue to monitor pt.

## 2017-02-26 NOTE — PROGRESS NOTES
"In room giving patient her morning meds, pt asked for wash cloth to wipe her face, provided pt with a warm wet wash cloth, upon leaving the room asked pt was ready to get a bath and she stated " i basically just gave myself one", told pt I could help her take a complete bath at this time, she refused, repositioned pt for her comfort and told patient I would be back in an hour to round.  "

## 2017-02-26 NOTE — SUBJECTIVE & OBJECTIVE
Interval History: feeling better-foot pain    Review of Systems   Respiratory: Negative.    Cardiovascular: Negative.    Gastrointestinal: Negative.      Objective:     Vital Signs (Most Recent):  Temp: 97.5 °F (36.4 °C) (02/25/17 2003)  Pulse: (!) 57 (02/25/17 2003)  Resp: 16 (02/25/17 2003)  BP: (!) 92/48 (02/25/17 2003)  SpO2: 96 % (02/25/17 2003) Vital Signs (24h Range):  Temp:  [97.5 °F (36.4 °C)-98.7 °F (37.1 °C)] 97.5 °F (36.4 °C)  Pulse:  [52-75] 57  Resp:  [16-20] 16  SpO2:  [95 %-100 %] 96 %  BP: ()/(46-54) 92/48     Weight: 74.4 kg (164 lb)  Body mass index is 30 kg/(m^2).    Intake/Output Summary (Last 24 hours) at 02/25/17 2327  Last data filed at 02/25/17 1800   Gross per 24 hour   Intake              750 ml   Output                0 ml   Net              750 ml      Physical Exam   Constitutional: She is oriented to person, place, and time. She appears well-developed. No distress.   HENT:   Head: Normocephalic and atraumatic.   Mouth/Throat: Oropharynx is clear and moist.   Eyes: Conjunctivae are normal. Pupils are equal, round, and reactive to light. Right eye exhibits no discharge. Left eye exhibits no discharge. No scleral icterus.   Neck: Normal range of motion. Neck supple. No JVD present. No thyromegaly present.   Cardiovascular: Normal rate and regular rhythm.  Exam reveals no gallop and no friction rub.    No murmur heard.  Pulmonary/Chest: Effort normal and breath sounds normal. No respiratory distress. She has no wheezes. She has no rales. She exhibits no tenderness.   Abdominal: Soft. Bowel sounds are normal. She exhibits no distension. There is no tenderness. There is no rebound and no guarding.   Musculoskeletal: She exhibits edema and tenderness.   Lymphadenopathy:     She has no cervical adenopathy.   Neurological: She is alert and oriented to person, place, and time. She displays normal reflexes. No cranial nerve deficit.   Skin: Skin is warm and dry. She is not diaphoretic.  There is erythema.   Right foot-intact blister with scabbed area in middle\and purulent appearing material under blister. minimall TTP    Psychiatric: She has a normal mood and affect. Her behavior is normal.   Vitals reviewed.      Significant Labs: All pertinent labs within the past 24 hours have been reviewed.    Significant Imaging: I have reviewed and interpreted all pertinent imaging results/findings within the past 24 hours.

## 2017-02-26 NOTE — PLAN OF CARE
Problem: Patient Care Overview  Goal: Plan of Care Review  Outcome: Ongoing (interventions implemented as appropriate)  Pt had an uneventful night free from fall or injury.  Pt slept well and was repositioned with pillows to avoid skin breakdown.  Pt was continent of urine and ambulated with moderate assist to the BR.  Pt is c/o constipation and would like to try an enema today.  No pain was reported during shift.  Pts feet are swollen and red bilaterally with dressing changed to L ft.  IV abx were given as ordered.  Call bell and bedside table are within reach with bed alarm in place.  Nurse rounded hourly to ensure pt safety.

## 2017-02-26 NOTE — PROGRESS NOTES
[facility] Medicine  Progress Note    Patient Name: Jennifer Diaz  MRN: [id number]  Patient Class: IP- Inpatient   Admission Date: 2/23/2017  Length of Stay: 0 days  Attending Physician: Jocelyn Lindquist MD  Primary Care Provider: Catrachito Mart MD        Subjective:     Principal Problem:Bilateral lower leg cellulitis    HPI:  Ms. Diaz is an 85 yo female admitted to the services of [facility] [place] medicine via the ER for right foot wound, bilateral lower leg cellulitis and multiple falls. Pt states that she started falling on February 1st. Pt is confident that she has not passed out causing any 3 of the falls so far this month. Pt states that her legs have been swollen since starting a medicine for her bladder. States that legs have been red for only about two weeks. Pt is not sure how long she has had a sore on the right foot. Pt lives alone. Son lives nearby but is only able to visit weekly due to work. History of asthma, uses an inhaler at home. Other past medical history as below.      Hospital Course:  Falls-eval by PT/OT. No arrythmias or syncopal episodes. Consult SW for snf placement.   Discussed w son    Cellulitis-on teflaro; improving redness.   Right foot wound- intact blister with purulent dc and scab. Orthopedic consulted, I and D and debrided with minimal purulent material expressed and no odor. Wound care ordered.   Blood culture    Asthma-stable; prn nebulizer treatments.       Interval History: feeling better-foot pain    Review of Systems   Respiratory: Negative.    Cardiovascular: Negative.    Gastrointestinal: Negative.      Objective:     Vital Signs (Most Recent):  Temp: 97.5 °F (36.4 °C) (02/25/17 2003)  Pulse: (!) 57 (02/25/17 2003)  Resp: 16 (02/25/17 2003)  BP: (!) 92/48 (02/25/17 2003)  SpO2: 96 % (02/25/17 2003) Vital Signs (24h Range):  Temp:  [97.5 °F (36.4 °C)-98.7 °F (37.1 °C)] 97.5 °F (36.4 °C)  Pulse:  [52-75] 57  Resp:  [16-20] 16  SpO2:  [95 %-100 %]  96 %  BP: ()/(46-54) 92/48     Weight: 74.4 kg (164 lb)  Body mass index is 30 kg/(m^2).    Intake/Output Summary (Last 24 hours) at 02/25/17 2325  Last data filed at 02/25/17 1800   Gross per 24 hour   Intake              750 ml   Output                0 ml   Net              750 ml      Physical Exam   Constitutional: She is oriented to person, place, and time. She appears well-developed. No distress.   HENT:   Head: Normocephalic and atraumatic.   Mouth/Throat: Oropharynx is clear and moist.   Eyes: Conjunctivae are normal. Pupils are equal, round, and reactive to light. Right eye exhibits no discharge. Left eye exhibits no discharge. No scleral icterus.   Neck: Normal range of motion. Neck supple. No JVD present. No thyromegaly present.   Cardiovascular: Normal rate and regular rhythm.  Exam reveals no gallop and no friction rub.    No murmur heard.  Pulmonary/Chest: Effort normal and breath sounds normal. No respiratory distress. She has no wheezes. She has no rales. She exhibits no tenderness.   Abdominal: Soft. Bowel sounds are normal. She exhibits no distension. There is no tenderness. There is no rebound and no guarding.   Musculoskeletal: She exhibits edema and tenderness.   Lymphadenopathy:     She has no cervical adenopathy.   Neurological: She is alert and oriented to person, place, and time. She displays normal reflexes. No cranial nerve deficit.   Skin: Skin is warm and dry. She is not diaphoretic. There is erythema.   Right foot-intact blister with scabbed area in middle\and purulent appearing material under blister. minimall TTP    Psychiatric: She has a normal mood and affect. Her behavior is normal.   Vitals reviewed.      Significant Labs: All pertinent labs within the past 24 hours have been reviewed.    Significant Imaging: I have reviewed and interpreted all pertinent imaging results/findings within the past 24 hours.    Assessment/Plan:      * Bilateral lower leg cellulitis  Acute  US  to evaluate for DVT-negative  Empiric coverage with Teflaro  Check CRP        Moderate persistent asthma without complication  Despite pt feeling short of breath, there is no wheezing  Most likely there is an anxiety component involved  duonebs       Foot ulcer, left  As above, start Teflaro  Consult ortho-debrided by ortho  Consult wound care      Debility  Progressively getting worse  Associated with weakness and multiple falls  Consult PT and OT  Pt would greatly benefit from SNF for therapy-consult to SW   Will place PPD in anticipation  Consult dietician       VTE Risk Mitigation         Ordered     enoxaparin injection 40 mg  Daily     Route:  Subcutaneous        02/24/17 0226     Medium Risk of VTE  Once      02/24/17 0226          Jocelyn Lindquist MD  Department of Hospital Medicine   [facility]

## 2017-02-27 PROBLEM — J96.01 ACUTE HYPOXEMIC RESPIRATORY FAILURE: Status: ACTIVE | Noted: 2017-02-27

## 2017-02-27 PROBLEM — K59.00 CONSTIPATION: Status: ACTIVE | Noted: 2017-02-27

## 2017-02-27 PROCEDURE — 27000221 HC OXYGEN, UP TO 24 HOURS

## 2017-02-27 PROCEDURE — 99900035 HC TECH TIME PER 15 MIN (STAT)

## 2017-02-27 PROCEDURE — 97530 THERAPEUTIC ACTIVITIES: CPT

## 2017-02-27 PROCEDURE — 94799 UNLISTED PULMONARY SVC/PX: CPT

## 2017-02-27 PROCEDURE — 63600175 PHARM REV CODE 636 W HCPCS: Performed by: NURSE PRACTITIONER

## 2017-02-27 PROCEDURE — 94761 N-INVAS EAR/PLS OXIMETRY MLT: CPT

## 2017-02-27 PROCEDURE — 94640 AIRWAY INHALATION TREATMENT: CPT

## 2017-02-27 PROCEDURE — 25000003 PHARM REV CODE 250: Performed by: NURSE PRACTITIONER

## 2017-02-27 PROCEDURE — 25000003 PHARM REV CODE 250: Performed by: HOSPITALIST

## 2017-02-27 PROCEDURE — 97116 GAIT TRAINING THERAPY: CPT

## 2017-02-27 PROCEDURE — 11000001 HC ACUTE MED/SURG PRIVATE ROOM

## 2017-02-27 RX ORDER — POLYETHYLENE GLYCOL 3350 17 G/17G
0.4 POWDER, FOR SOLUTION ORAL DAILY
Status: DISCONTINUED | OUTPATIENT
Start: 2017-02-27 | End: 2017-02-28

## 2017-02-27 RX ADMIN — ASPIRIN 81 MG: 81 TABLET, COATED ORAL at 10:02

## 2017-02-27 RX ADMIN — CEFUROXIME AXETIL 250 MG: 250 TABLET ORAL at 10:02

## 2017-02-27 RX ADMIN — OXYBUTYNIN CHLORIDE 5 MG: 5 TABLET ORAL at 10:02

## 2017-02-27 RX ADMIN — ATORVASTATIN CALCIUM 10 MG: 10 TABLET, FILM COATED ORAL at 10:02

## 2017-02-27 RX ADMIN — LACTULOSE 20 G: 10 SOLUTION ORAL at 10:02

## 2017-02-27 RX ADMIN — FLUTICASONE PROPIONATE 2 SPRAY: 50 SPRAY, METERED NASAL at 10:02

## 2017-02-27 RX ADMIN — SODIUM PHOSPHATE, DIBASIC AND SODIUM PHOSPHATE, MONOBASIC 1 ENEMA: 7; 19 ENEMA RECTAL at 07:02

## 2017-02-27 RX ADMIN — ENOXAPARIN SODIUM 40 MG: 100 INJECTION SUBCUTANEOUS at 11:02

## 2017-02-27 RX ADMIN — FLUTICASONE FUROATE AND VILANTEROL TRIFENATATE 1 PUFF: 100; 25 POWDER RESPIRATORY (INHALATION) at 08:02

## 2017-02-27 RX ADMIN — CLONAZEPAM 0.5 MG: 0.5 TABLET ORAL at 10:02

## 2017-02-27 RX ADMIN — DOCUSATE SODIUM 100 MG: 100 CAPSULE, LIQUID FILLED ORAL at 10:02

## 2017-02-27 RX ADMIN — POLYETHYLENE GLYCOL (3350) 30 G: 17 POWDER, FOR SOLUTION ORAL at 05:02

## 2017-02-27 RX ADMIN — TRIAMCINOLONE ACETONIDE: 1 OINTMENT TOPICAL at 10:02

## 2017-02-27 NOTE — PLAN OF CARE
Problem: Physical Therapy Goal  Goal: Physical Therapy Goal  Goals to be met by: 3/04/2017     Patient will increase functional independence with mobility by performin). Supine to sit with Modified [place]  2). Sit to supine with Modified [place]  3). Sit to stand transfer with Modified [place]  4). Gait x > 50 feet with Modified [place] using Rolling Walker.    Outcome: Ongoing (interventions implemented as appropriate)  Pt  Ambulated 15 ft with RW and chair following. OOB to chair with bilateral LE's elevated.

## 2017-02-27 NOTE — PROGRESS NOTES
Cm called the LOCET in to Darling at [facility].      10:20am.  Cm faxed the Level 1PASRR Screen to  [phone].    10:22am. Confirmation received.    10:36am.  Cm called daughter - Tami to inform her of the different SNF.  She selected [facility], [facility] and [facility].  Cm will send information via [employer].  Daughter will call me later to tell me which one is her first choice,2nd and 3rd choice.    11:10am. Cm spoke with Jacqui at [facility]-added to the treatment team-Fab[facility] at [facility] Will pull the information.  Cm sent the information to [facility] and [facility].   Izzy with [facility] - Shimon left message with Jessica to call me. Cm called Marina with [facility], Cm left message with Bhumi's line was busy.     11:20  Shimon received a call from Liane, keri's daughter, she has selected [facility] as 1st choice, [facility] 2nd, and [facility] 3rd.  She asked me to take [facility] off the list.    11:30 Izzy Arrington called to inform me that she received the referral through [employer].  Shimon received the 142 from [facility].

## 2017-02-27 NOTE — SUBJECTIVE & OBJECTIVE
Interval History: foot better.   Son present    Review of Systems   Respiratory: Negative.    Cardiovascular: Negative.    Gastrointestinal: Positive for constipation.     Objective:     Vital Signs (Most Recent):  Temp: 97.7 °F (36.5 °C) (02/26/17 1910)  Pulse: 64 (02/26/17 1910)  Resp: 18 (02/26/17 1910)  BP: (!) 100/54 (02/26/17 1910)  SpO2: 97 % (02/26/17 1910) Vital Signs (24h Range):  Temp:  [97.6 °F (36.4 °C)-98.6 °F (37 °C)] 97.7 °F (36.5 °C)  Pulse:  [56-74] 64  Resp:  [16-18] 18  SpO2:  [92 %-97 %] 97 %  BP: ()/(46-76) 100/54     Weight: 74.4 kg (164 lb)  Body mass index is 30 kg/(m^2).    Intake/Output Summary (Last 24 hours) at 02/26/17 2144  Last data filed at 02/26/17 1800   Gross per 24 hour   Intake              700 ml   Output                0 ml   Net              700 ml      Physical Exam   Constitutional: She is oriented to person, place, and time. She appears well-developed. No distress.   HENT:   Head: Normocephalic and atraumatic.   Mouth/Throat: Oropharynx is clear and moist.   Eyes: Conjunctivae are normal. Pupils are equal, round, and reactive to light. Right eye exhibits no discharge. Left eye exhibits no discharge. No scleral icterus.   Neck: Normal range of motion. Neck supple. No JVD present. No thyromegaly present.   Cardiovascular: Normal rate and regular rhythm.  Exam reveals no gallop and no friction rub.    No murmur heard.  Pulmonary/Chest: Effort normal and breath sounds normal. No respiratory distress. She has no wheezes. She has no rales. She exhibits no tenderness.   Abdominal: Soft. Bowel sounds are normal. She exhibits no distension. There is no tenderness. There is no rebound and no guarding.   Musculoskeletal: She exhibits edema and tenderness.   Lymphadenopathy:     She has no cervical adenopathy.   Neurological: She is alert and oriented to person, place, and time. She displays normal reflexes. No cranial nerve deficit.   Skin: Skin is warm and dry. She is not  diaphoretic. There is erythema.   Right foot-intact blister with scabbed area in middle\and purulent appearing material under blister. minimall TTP    Psychiatric: She has a normal mood and affect. Her behavior is normal.   Vitals reviewed.      Significant Labs: All pertinent labs within the past 24 hours have been reviewed.    Significant Imaging: na

## 2017-02-27 NOTE — PLAN OF CARE
02/27/17 1330   PRE-TX-O2-ETCO2   O2 Device (Oxygen Therapy) nasal cannula   Flow (L/min) 2   Oxygen Concentration (%) 28   SpO2 95 %   Incentive Spirometer   $ Incentive Spirometer Charges done with encouragement   Administration (Incentive Spirometer) done with encouragement   Number of Repetitions (Incentive Spirometer) 10   Level (mL) (Incentive Spirometer) 750   Patient Tolerance good   Ready to Wean/Extubation Screen   FIO2<60 (chart decimal) 0.28

## 2017-02-27 NOTE — PT/OT/SLP PROGRESS
Physical Therapy  Treatment    Jennifer Diaz   MRN: [id number]   Admitting Diagnosis: Bilateral lower leg cellulitis    PT Received On: 17  PT Start Time: 1339     PT Stop Time: 1404    PT Total Time (min): 25 min       Billable Minutes:  Gait [id number] and Therapeutic Activity 10    Treatment Type: Treatment  PT/PTA: PT     PTA Visit Number: 1       General Precautions: Standard, fall  Orthopedic Precautions: N/A   Braces: N/A         Subjective:  Communicated with nurse Santana prior to session.  Pt reported she thinks she will be discharged by Wednesday but still needed to hear from doctor  Pt initially did not want to walk to hallways- stated that she is fearful of falling      Pain Ratin/10                   Objective:   Patient found with: oxygen, telemetry    Functional Mobility:  Bed Mobility:   Rolling/Turning Right: Minimum assistance  Supine to Sit: Minimum Assistance    Transfers:  Sit <> Stand Assistance: Moderate Assistance  Sit <> Stand Assistive Device: Rolling Walker  Bed <> Chair Assistance: Moderate Assistance  Bed <> Chair Assistive Device: Rolling Walker    Gait:   Gait Distance: 15 ft with cmhair following and O2 at 2 liters  Assistance 1: Moderate assistance  Gait Assistive Device: Rolling walker  Gait Pattern: 3-point gait  Gait Deviation(s): decreased deya, increased time in double stance, decreased velocity of limb motion, decreased step length, increased stride width, decreased weight-shifting ability, forward lean    Stairs:      Balance:   Static Sit: FAIR-: Maintains without assist but inconsistent   Dynamic Sit: FAIR+: Maintains balance through MINIMAL excursions of active trunk motion  Static Stand: POOR: Needs MODERATE assist to maintain  Dynamic stand: POOR: N/A     Therapeutic Activities and Exercises:  Pt ambulated to hallways with chair follow- required standing rest break and cueing on proper RW placement   Pt OOB to chair with feet elevated and heels  offloaded   Pt encouraged to perform ankle pumps in chair/in bed to help improve LE circulation     AM-PAC 6 CLICK MOBILITY  How much help from another person does this patient currently need?   1 = Unable, Total/Dependent Assistance  2 = A lot, Maximum/Moderate Assistance  3 = A little, Minimum/Contact Guard/Supervision  4 = None, Modified [place]/Independent         AM-PAC Raw Score CMS G-Code Modifier Level of Impairment Assistance   6 % Total / Unable   7 - 9 CM 80 - 100% Maximal Assist   10 - 14 CL 60 - 80% Moderate Assist   15 - 19 CK 40 - 60% Moderate Assist   20 - 22 CJ 20 - 40% Minimal Assist   23 CI 1-20% SBA / CGA   24 CH 0% Independent/ Mod I     Patient left up in chair with all lines intact and call button in reach.    Assessment:  Jennifer Diaz is a 84 y.o. female with a medical diagnosis of Bilateral lower leg cellulitis and presents with weakness, impaired endurance, and poor ambulatory posture- forward head, rounded shoulders, trunk flexion, forward lean. Pt presents with wound on L foot- covered in dressing. Pt required constant cueing to keep RW in front of her with enough room to walk, look up, and take bigger steps. Pt appeared fatigued after gait training session. Pt would benefit from continued therapy from SNF    Rehab identified problem list/impairments: Rehab identified problem list/impairments: weakness, impaired endurance, impaired self care skills, impaired functional mobilty, gait instability, impaired balance, decreased lower extremity function, decreased safety awareness, impaired skin, impaired cardiopulmonary response to activity    Rehab potential is fair.    Activity tolerance: Fair    Discharge recommendations: Discharge Facility/Level Of Care Needs: nursing facility, skilled     Barriers to discharge: Barriers to Discharge: Decreased caregiver support    Equipment recommendations:       GOALS:   Physical Therapy Goals        Problem: Physical Therapy Goal     Goal Priority Disciplines Outcome Goal Variances Interventions   Physical Therapy Goal     PT/OT, PT Ongoing (interventions implemented as appropriate)     Description:  Goals to be met by: 3/04/2017     Patient will increase functional independence with mobility by performin). Supine to sit with Modified [place]  2). Sit to supine with Modified [place]  3). Sit to stand transfer with Modified [place]  4). Gait  x  > 50 feet with Modified [place] using Rolling Walker.                 PLAN:    Patient to be seen 6 x/week  to address the above listed problems via gait training, therapeutic activities, therapeutic exercises  Plan of Care expires: 17  Plan of Care reviewed with: patient       Dyana Winters, SPT  2017    I certify that I was present in the room directing the student in service delivery and guiding them using my skilled judgment. As the co-signing therapist I have reviewed the students documentation and am responsible for the treatment, assessment, and plan.       Jaclyn Alonzo, PT  2017

## 2017-02-27 NOTE — PHYSICIAN QUERY
PT Name: Jennifer Diaz  MR #: [id number]     Physician Query Form - Documentation Clarification    Reviewer  Ext [phone] Lissette Johnson RN, CDS    This form is a permanent document in the medical record.     Query Date: February 27, 2017  By submitting this query, we are merely seeking further clarification of documentation to reflect the severity of illness of your patient. Please utilize your independent clinical judgment when addressing the question(s) below.    (The Medical record reflects the following:)      Supporting Clinical Findings Location in Medical Record   Appearance, UA: Cloudy (A)  Protein, UA: Trace (A)  RBC, UA: 1  WBC, UA: 5  Bacteria, UA: Rare  Squam Epithel, UA: 11  Amorphous, UA: Many (A)       Urinalysis 2/23   ceftaroline fosamil (TEFLARO) 600 mg in dextrose 5 % 50 mL IVPB  :  Dose 600 mg  :  50 mL/hr  :  Intravenous  :  Every 12 hours    cefUROXime tablet 250 mg  :  Dose 250 mg  :  Oral  :  Every 12 hours           MAR 2/24-2/26          MAR 2/27                                                                            Doctor, Please specify diagnosis or diagnoses associated with above clinical findings.    Physician Use Only    [  ] Urinary Tract Infection    [ X ] Other(Specify)___ Normal urinalysis; no UTI. She is on antibiotics for cellulitis.                                                                                                                [  ] Unable to determine

## 2017-02-27 NOTE — PLAN OF CARE
Problem: Patient Care Overview  Goal: Plan of Care Review  Outcome: Ongoing (interventions implemented as appropriate)  Pt had an uneventful night free from fall or injury.  Pt's VSS with minimal c/o pain to BLE.  Pt is very particular about her routine and was repositioned to prevent breakdown and promote comfort frequently with several pillow supports.  Pt's IV abx were changed to PO.  PPD was read with negative results.  Dressings were changed to L ft and elbow. Pt is planning to d/c to SNF today is approved and after physician discusses POC with POA, pt's daughter.

## 2017-02-27 NOTE — PROGRESS NOTES
Shimon called Karlee with [facility] for authorization. Pending authorization.  [facility] has accepted patient.      3:45 Shimon was notified by Dr Alfredo that pt will not be ready for discharge today.  Maybe Tuesday or Wednesday.  Marina with [facility] notified.    4:20p.poonam Desir with [facility] has given me the Authorization # [id number].

## 2017-02-27 NOTE — PLAN OF CARE
02/27/17 0804   Patient Assessment/Suction   Level of Consciousness (AVPU) alert   Respiratory Effort Normal   PRE-TX-O2-ETCO2   O2 Device (Oxygen Therapy) nasal cannula   $ Is the patient on Oxygen? Yes   Flow (L/min) 2   Oxygen Concentration (%) 28   SpO2 (!) 94 %   Pulse Oximetry Type Intermittent   $ Pulse Oximetry - Multiple Charge Pulse Oximetry - Multiple   Pulse (!) 56   Resp 16   Aerosol Therapy   $ Aerosol Therapy Charges PRN treatment not required   Inhaler   $ Inhaler Charges MDI (Metered Dose Inahler) Treatment   Respiratory Treatment Status given;mouth rinsed post treatment   SVN/Inhaler Treatment Route mouthpiece   Patient Tolerance good   Post-Treatment   Post-treatment Heart Rate (beats/min) 57   Ready to Wean/Extubation Screen   FIO2<60 (chart decimal) 0.28

## 2017-02-28 PROCEDURE — 25000003 PHARM REV CODE 250: Performed by: HOSPITALIST

## 2017-02-28 PROCEDURE — 97802 MEDICAL NUTRITION INDIV IN: CPT

## 2017-02-28 PROCEDURE — 11000001 HC ACUTE MED/SURG PRIVATE ROOM

## 2017-02-28 PROCEDURE — 94799 UNLISTED PULMONARY SVC/PX: CPT

## 2017-02-28 PROCEDURE — 63600175 PHARM REV CODE 636 W HCPCS: Performed by: NURSE PRACTITIONER

## 2017-02-28 PROCEDURE — 25000003 PHARM REV CODE 250: Performed by: NURSE PRACTITIONER

## 2017-02-28 PROCEDURE — 94761 N-INVAS EAR/PLS OXIMETRY MLT: CPT

## 2017-02-28 PROCEDURE — 94640 AIRWAY INHALATION TREATMENT: CPT

## 2017-02-28 PROCEDURE — G8978 MOBILITY CURRENT STATUS: HCPCS | Mod: CM

## 2017-02-28 PROCEDURE — 27000221 HC OXYGEN, UP TO 24 HOURS

## 2017-02-28 PROCEDURE — 25000242 PHARM REV CODE 250 ALT 637 W/ HCPCS: Performed by: HOSPITALIST

## 2017-02-28 PROCEDURE — 97110 THERAPEUTIC EXERCISES: CPT

## 2017-02-28 PROCEDURE — 97116 GAIT TRAINING THERAPY: CPT

## 2017-02-28 RX ORDER — IPRATROPIUM BROMIDE AND ALBUTEROL SULFATE 2.5; .5 MG/3ML; MG/3ML
3 SOLUTION RESPIRATORY (INHALATION) EVERY 6 HOURS
Status: DISCONTINUED | OUTPATIENT
Start: 2017-02-28 | End: 2017-03-01 | Stop reason: HOSPADM

## 2017-02-28 RX ADMIN — OXYBUTYNIN CHLORIDE 5 MG: 5 TABLET ORAL at 09:02

## 2017-02-28 RX ADMIN — IPRATROPIUM BROMIDE AND ALBUTEROL SULFATE 3 ML: .5; 3 SOLUTION RESPIRATORY (INHALATION) at 01:02

## 2017-02-28 RX ADMIN — CEFUROXIME AXETIL 250 MG: 250 TABLET ORAL at 09:02

## 2017-02-28 RX ADMIN — DOCUSATE SODIUM 100 MG: 100 CAPSULE, LIQUID FILLED ORAL at 09:02

## 2017-02-28 RX ADMIN — CLONAZEPAM 0.5 MG: 0.5 TABLET ORAL at 09:02

## 2017-02-28 RX ADMIN — ENOXAPARIN SODIUM 40 MG: 100 INJECTION SUBCUTANEOUS at 01:02

## 2017-02-28 RX ADMIN — IPRATROPIUM BROMIDE AND ALBUTEROL SULFATE 3 ML: .5; 3 SOLUTION RESPIRATORY (INHALATION) at 07:02

## 2017-02-28 RX ADMIN — FLUTICASONE FUROATE AND VILANTEROL TRIFENATATE 1 PUFF: 100; 25 POWDER RESPIRATORY (INHALATION) at 08:02

## 2017-02-28 RX ADMIN — POLYETHYLENE GLYCOL (3350) 30 G: 17 POWDER, FOR SOLUTION ORAL at 09:02

## 2017-02-28 RX ADMIN — TRIAMCINOLONE ACETONIDE: 1 OINTMENT TOPICAL at 09:02

## 2017-02-28 RX ADMIN — ATORVASTATIN CALCIUM 10 MG: 10 TABLET, FILM COATED ORAL at 09:02

## 2017-02-28 RX ADMIN — ASPIRIN 81 MG: 81 TABLET, COATED ORAL at 09:02

## 2017-02-28 RX ADMIN — FLUTICASONE PROPIONATE 2 SPRAY: 50 SPRAY, METERED NASAL at 09:02

## 2017-02-28 RX ADMIN — IPRATROPIUM BROMIDE AND ALBUTEROL SULFATE 3 ML: .5; 3 SOLUTION RESPIRATORY (INHALATION) at 08:02

## 2017-02-28 NOTE — CONSULTS
[facility]  Adult Nutrition  Consult Note    SUMMARY     Recommendations    Recommendation/Intervention: 1) Continue cardiac diet 2) Add Boost Plus with meals 3) Consider adding calcium with vitamin D re pt concern for bone health  Goals: 1) Meal intake >= 75% 2) Utilize supplement  Nutrition Goal Status: progressing    1. Foot ulcer, left    2. Right shoulder pain    3. Right arm pain    4. Right axillary hidradenitis    5. Bilateral lower leg cellulitis      Past Medical History:   Diagnosis Date    Acid reflux     Family history of breast cancer     Family history of diabetes mellitus     Intrinsic asthma     Psoriasis      Continuum of Care Plan    Referral to Outpatient Services:  (Cardiac with high protein supplement such as Boost Plus)    Reason for Assessment    Reason for Assessment: RD follow-up   Interdisciplinary Rounds: did not attend   General Information Comments: Pt reports good appetite, but she would like different foods.  Obtained pt preferences and notified the diet office.      Nutrition Prescription Ordered    Current Diet Order: cardiac      Evaluation of Received Nutrients/Fluid Intake     Energy Calories Required: not meeting needs   IV Fluid (mL):  (no continuous)  Other Fluid (mL): 818 (per I&O)    Fluid Required: meeting needs  Comments: Meal intake 50%,50%,75% for last 3 documented meals.  Tolerance: tolerating     Nutrition Risk Screen     Nutrition Risk Screen: no indicators present    Nutrition/Diet History    Patient Reported Diet/Restrictions/Preferences: general   Food Preferences: bananas, oranges, muffins, orange juice, tuna salad with saltine crackers   Supplemental Drinks or Food Habits:  (Pedialyte re concern about electrolytes)     Labs/Tests/Procedures/Meds    Diagnostic Test/Procedure Review: reviewed, pertinent (wound care)  Pertinent Labs Reviewed: reviewed, pertinent      BMP  Lab Results   Component Value Date     02/23/2017    K 3.5  02/23/2017    CL 94 (L) 02/23/2017    CO2 32 (H) 02/23/2017    BUN 21 02/23/2017    CREATININE 0.7 02/23/2017    CALCIUM 9.9 02/23/2017    ANIONGAP 12 02/23/2017    ESTGFRAFRICA >60 02/23/2017    EGFRNONAA >60 02/23/2017     No results for input(s): POCTGLUCOSE in the last 24 hours.  Lab Results   Component Value Date    CRP 31.1 (H) 02/23/2017     Lab Results   Component Value Date    ALBUMIN 3.3 (L) 02/23/2017     Pertinent Medications Reviewed: reviewed, pertinent      Scheduled Meds:   albuterol-ipratropium 2.5mg-0.5mg/3mL  3 mL Nebulization Q6H    aspirin  81 mg Oral Daily    atorvastatin  10 mg Oral QHS    cefUROXime  250 mg Oral Q12H    clonazePAM  0.5 mg Oral QHS    docusate sodium  100 mg Oral BID    enoxaparin  40 mg Subcutaneous Daily    fluticasone  2 spray Each Nare Daily    fluticasone-vilanterol  1 puff Inhalation Daily    oxybutynin  5 mg Oral BID    propranolol  20 mg Oral BID    triamcinolone acetonide 0.1%   Topical BID    tuberculin  5 Units Intradermal Once     Continuous Infusions:   PRN Meds:.albuterol-ipratropium 2.5mg-0.5mg/3mL, bisacodyl, hydrocodone-apap 2.5-108 MG/5 ML, insulin aspart, ondansetron      Physical Findings     Tubes:  (NC)   Skin:  (Wenceslao score 18)    Anthropometrics    Height Method: Stated  Height (inches): 62.01 in  Weight Method: Bed Scale  Weight (kg): 74.4 kg   Ideal Body Weight (IBW), Female: 110.05 lb   % Ideal Body Weight, Female (lb): 149.04 lb  BMI (kg/m2): 29.99  BMI Grade: 25 - 29.9 - overweight     Anthropometrics (Special Considerations)       Estimated/Assessed Needs     Weight Used For Calorie Calculations: 74.4 kg (164 lb 0.4 oz)   Height (cm): 157.5 cm    Energy Needs: MSJ with AF 1.2-1.5 per recs for wound care. 3160-2498 calories  RMR ([place]-St. Jeor Equation): 1153.35   Weight Used For Protein Calculations: 74.4 kg (164 lb 0.4 oz)   1.2 gm Protein (gm): 89.47 and 1.5 gm Protein (gm): 111.83  Fluid Need Method: (1 ml/kcal or per MD rec)        Monitor and Evaluation     Food and Nutrient Intake: energy intake  Food and Nutrient Adminstration: diet order   Anthropometric Measurements: weight, weight change  Biochemical Data, Medical Tests and Procedures: glucose/endocrine profile, inflammatory profile  Nutrition-Focused Physical Findings: skin      Nutrition Diagnosis     Increased needs r/t healing AEB presence of bilateral foot ulcers     Nutrition Risk    Level of Risk:  (2 x weekly)    Nutrition Follow-Up    RD Follow-up?: Yes    Assessment and Plan    No new Assessment & Plan notes have been filed under this hospital service since the last note was generated.  Service: Nutrition

## 2017-02-28 NOTE — SUBJECTIVE & OBJECTIVE
Interval History: hypoxemic, cxr ordered showing bilateral atelectasis. Still constipated, enema performed and having some stool for first time in 2 weeks.    Review of Systems   Respiratory: Negative.    Cardiovascular: Negative.    Gastrointestinal: Positive for constipation.     Objective:     Vital Signs (Most Recent):  Temp: 98.2 °F (36.8 °C) (02/27/17 2000)  Pulse: 62 (02/27/17 2136)  Resp: 14 (02/27/17 2136)  BP: (!) 105/51 (02/27/17 2000)  SpO2: 97 % (02/27/17 2136) Vital Signs (24h Range):  Temp:  [97.6 °F (36.4 °C)-98.2 °F (36.8 °C)] 98.2 °F (36.8 °C)  Pulse:  [56-65] 62  Resp:  [14-18] 14  SpO2:  [94 %-99 %] 97 %  BP: ()/(51-61) 105/51     Weight: 74.4 kg (164 lb)  Body mass index is 30 kg/(m^2).    Intake/Output Summary (Last 24 hours) at 02/28/17 0127  Last data filed at 02/27/17 1800   Gross per 24 hour   Intake              818 ml   Output                4 ml   Net              814 ml      Physical Exam   Constitutional: She is oriented to person, place, and time. She appears well-developed. No distress.   HENT:   Head: Normocephalic and atraumatic.   Mouth/Throat: Oropharynx is clear and moist.   Eyes: Conjunctivae are normal. Pupils are equal, round, and reactive to light. Right eye exhibits no discharge. Left eye exhibits no discharge. No scleral icterus.   Neck: Normal range of motion. Neck supple. No JVD present. No thyromegaly present.   Cardiovascular: Normal rate and regular rhythm.  Exam reveals no gallop and no friction rub.    No murmur heard.  Pulmonary/Chest: Effort normal. No respiratory distress. She has no wheezes. She has rales (bibasilar rales). She exhibits no tenderness.   Abdominal: Soft. Bowel sounds are normal. She exhibits no distension. There is no tenderness. There is no rebound and no guarding.   Musculoskeletal: She exhibits edema and tenderness.   Lymphadenopathy:     She has no cervical adenopathy.   Neurological: She is alert and oriented to person, place, and  time. She displays normal reflexes. No cranial nerve deficit.   Skin: Skin is warm and dry. She is not diaphoretic. There is erythema.   Right foot-intact blister with scabbed area in middle\and purulent appearing material under blister. minimall TTP    Psychiatric: She has a normal mood and affect. Her behavior is normal.   Vitals reviewed.      Significant Labs: Blood Culture: No results for input(s): LABBLOO in the last 48 hours.  BMP: No results for input(s): GLU, NA, K, CL, CO2, BUN, CREATININE, CALCIUM, MG in the last 48 hours.  CBC: No results for input(s): WBC, HGB, HCT, PLT in the last 48 hours.  CMP: No results for input(s): NA, K, CL, CO2, GLU, BUN, CREATININE, CALCIUM, PROT, ALBUMIN, BILITOT, ALKPHOS, AST, ALT, ANIONGAP, EGFRNONAA in the last 48 hours.    Invalid input(s): ESTGFAFRICA  Coagulation: No results for input(s): INR, APTT in the last 48 hours.    Invalid input(s): PT    Significant Imaging: I have reviewed all pertinent imaging results/findings within the past 24 hours.

## 2017-02-28 NOTE — ASSESSMENT & PLAN NOTE
Progressively getting worse  Associated with weakness and multiple falls  Consult PT and OT  Pt would greatly benefit from SNF for therapy-consult to SW     Consult dietician

## 2017-02-28 NOTE — PROGRESS NOTES
02/27/17 2139   Patient Assessment/Suction   Level of Consciousness (AVPU) alert   Respiratory Effort Unlabored;Normal   All Lung Fields Breath Sounds diminished   PRE-TX-O2-ETCO2   O2 Device (Oxygen Therapy) nasal cannula   $ Is the patient on Oxygen? Yes   Flow (L/min) 2   SpO2 97 %   Pulse Oximetry Type Intermittent   Pulse 62   Resp 14   Aerosol Therapy   $ Aerosol Therapy Charges PRN treatment not required   Incentive Spirometer   $ Incentive Spirometer Charges done with encouragement   Administration (Incentive Spirometer) done with encouragement   Number of Repetitions (Incentive Spirometer) 6   Level (mL) (Incentive Spirometer) 500   Patient Tolerance poor

## 2017-02-28 NOTE — PLAN OF CARE
Problem: Patient Care Overview  Goal: Plan of Care Review  Outcome: Ongoing (interventions implemented as appropriate)  Pt had an uneventful night free from fall or injury.  Pt is very particular and likes things a certain way but was very pleasant once situated.  Pt received a fleets enema at shift change and produced x2 lg formed BM's and reported much relief and is passing a lot of flatus.  Pt is voiding without difficulty.  Pt has no c/o pain and is tolerating PO.  VSS with no new breakdown noted.  Pt's dressing was changed to her L elbow.  Pt is planning [facility] with the next 1-2 days.  Bed alarm in place, call bell and personal items within reach.  Nurse assisted pt with repositioning and rounded in pt hourly to ensure pt safety.

## 2017-02-28 NOTE — PROGRESS NOTES
Dressing placed with wound care performed per orders to pt's L elbow abrasion.  Pt tolerated well with no c/o pain. Nurse will continue to monitor.

## 2017-02-28 NOTE — PT/OT/SLP PROGRESS
Physical Therapy  Treatment    Jennifer Diaz   MRN: [id number]   Admitting Diagnosis: Bilateral lower leg cellulitis    PT Received On: 17  PT Start Time: 1048     PT Stop Time: 1112    PT Total Time (min): 24 min       Billable Minutes:  Gait [id number] and Therapeutic Exercise 12    Treatment Type: Treatment  PT/PTA: PT     PTA Visit Number: 1       General Precautions: Standard, fall  Orthopedic Precautions: N/A   Braces:           Subjective:  Communicated with RN prior to session.  Pt is OK for PT.    Pain Ratin/10                   Objective:        Functional Mobility:  Bed Mobility:   Rolling/Turning Right: Minimum assistance  Scooting/Bridging: Minimum Assistance  Supine to Sit: Minimum Assistance  Sit to Supine: Minimum Assistance    Transfers:  Sit <> Stand Assistance: Minimum Assistance  Sit <> Stand Assistive Device: Rolling Walker    Gait:   Gait Distance: 2x20'  Assistance 1: Minimum assistance, Moderate assistance  Gait Assistive Device: Rolling walker  Gait Pattern: 2-point gait  Gait Deviation(s): decreased deya, increased time in double stance, decreased velocity of limb motion, decreased stride length    Stairs:      Balance:   Static Sit: GOOD-: Takes MODERATE challenges from all directions but inconsistently  Dynamic Sit: GOOD-: Maintains balance through MODERATE excursions of active trunk movement,     Static Stand: FAIR+: Takes MINIMAL challenges from all directions  Dynamic stand: FAIR: Needs CONTACT GUARD during gait     Therapeutic Activities and Exercises:  Gait; pt ambulated 2x20' with RW, performed TE in sitting; HIP FLEX ADD/ABD, LAQ, AP 2x20 each.     AM-PAC 6 CLICK MOBILITY  How much help from another person does this patient currently need?   1 = Unable, Total/Dependent Assistance  2 = A lot, Maximum/Moderate Assistance  3 = A little, Minimum/Contact Guard/Supervision  4 = None, Modified [place]/Independent         AM-PAC Raw Score CMS G-Code Modifier Level  of Impairment Assistance   6 % Total / Unable   7 - 9 CM 80 - 100% Maximal Assist   10 - 14 CL 60 - 80% Moderate Assist   15 - 19 CK 40 - 60% Moderate Assist   20 - 22 CJ 20 - 40% Minimal Assist   23 CI 1-20% SBA / CGA   24 CH 0% Independent/ Mod I     Patient left supine with all lines intact, call button in reach and RN notified.    Assessment:  Jennifer Diaz is a 84 y.o. female with a medical diagnosis of Bilateral lower leg cellulitis and presents with gait abnormality, decreased functional mobility, strength and endurance deficits..    Rehab identified problem list/impairments: Rehab identified problem list/impairments: weakness, gait instability, impaired endurance, impaired balance, impaired functional mobilty    Rehab potential is good.    Activity tolerance: Fair    Discharge recommendations:       Barriers to discharge:      Equipment recommendations: Equipment Needed After Discharge: none     GOALS:   Physical Therapy Goals        Problem: Physical Therapy Goal    Goal Priority Disciplines Outcome Goal Variances Interventions   Physical Therapy Goal     PT/OT, PT Ongoing (interventions implemented as appropriate)     Description:  Goals to be met by: 3/04/2017     Patient will increase functional independence with mobility by performin). Supine to sit with Modified [place]  2). Sit to supine with Modified [place]  3). Sit to stand transfer with Modified [place]  4). Gait  x  > 50 feet with Modified [place] using Rolling Walker.                 PLAN:    Patient to be seen daily  to address the above listed problems via gait training, therapeutic activities, therapeutic exercises  Plan of Care expires: 17  Plan of Care reviewed with: patient         Marco Antonio Lucero, PT  2017

## 2017-02-28 NOTE — PROGRESS NOTES
[facility] Hospital Medicine  Progress Note    Patient Name: Jennifer Diaz  MRN: [id number]  Patient Class: IP- Inpatient   Admission Date: 2/23/2017  Length of Stay: 3 days  Attending Physician: Jose Luis Alfredo MD  Primary Care Provider: Catrachito Mart MD        Subjective:     Principal Problem:Bilateral lower leg cellulitis    HPI:  Ms. Diaz is an 85 yo female admitted to the services of hospital medicine via the ER for right foot wound, bilateral lower leg cellulitis and multiple falls. Pt states that she started falling on February 1st. Pt is confident that she has not passed out causing any 3 of the falls so far this month. Pt states that her legs have been swollen since starting a medicine for her bladder. States that legs have been red for only about two weeks. Pt is not sure how long she has had a sore on the right foot. Pt lives alone. Son lives nearby but is only able to visit weekly due to work. History of asthma, uses an inhaler at home. Other past medical history as below.      Hospital Course:  Falls-eval by PT/OT. No arrythmias or syncopal episodes. Consult SW for snf placement.   Discussed w son    Cellulitis-on teflaro; improving redness.   Right foot wound- intact blister with purulent dc and scab. Orthopedic consulted, I and D and debrided with minimal purulent material expressed and no odor. Wound care ordered.     Asthma-stable; prn nebulizer treatments.       Interval History: hypoxemic, cxr ordered showing bilateral atelectasis. Still constipated, enema performed and having some stool for first time in 2 weeks.    Review of Systems   Respiratory: Negative.    Cardiovascular: Negative.    Gastrointestinal: Positive for constipation.     Objective:     Vital Signs (Most Recent):  Temp: 98.2 °F (36.8 °C) (02/27/17 2000)  Pulse: 62 (02/27/17 2136)  Resp: 14 (02/27/17 2136)  BP: (!) 105/51 (02/27/17 2000)  SpO2: 97 % (02/27/17 2136) Vital Signs (24h Range):  Temp:  [97.6 °F  (36.4 °C)-98.2 °F (36.8 °C)] 98.2 °F (36.8 °C)  Pulse:  [56-65] 62  Resp:  [14-18] 14  SpO2:  [94 %-99 %] 97 %  BP: ()/(51-61) 105/51     Weight: 74.4 kg (164 lb)  Body mass index is 30 kg/(m^2).    Intake/Output Summary (Last 24 hours) at 02/28/17 0127  Last data filed at 02/27/17 1800   Gross per 24 hour   Intake              818 ml   Output                4 ml   Net              814 ml      Physical Exam   Constitutional: She is oriented to person, place, and time. She appears well-developed. No distress.   HENT:   Head: Normocephalic and atraumatic.   Mouth/Throat: Oropharynx is clear and moist.   Eyes: Conjunctivae are normal. Pupils are equal, round, and reactive to light. Right eye exhibits no discharge. Left eye exhibits no discharge. No scleral icterus.   Neck: Normal range of motion. Neck supple. No JVD present. No thyromegaly present.   Cardiovascular: Normal rate and regular rhythm.  Exam reveals no gallop and no friction rub.    No murmur heard.  Pulmonary/Chest: Effort normal. No respiratory distress. She has no wheezes. She has rales (bibasilar rales). She exhibits no tenderness.   Abdominal: Soft. Bowel sounds are normal. She exhibits no distension. There is no tenderness. There is no rebound and no guarding.   Musculoskeletal: She exhibits edema and tenderness.   Lymphadenopathy:     She has no cervical adenopathy.   Neurological: She is alert and oriented to person, place, and time. She displays normal reflexes. No cranial nerve deficit.   Skin: Skin is warm and dry. She is not diaphoretic. There is erythema.   Right foot-intact blister with scabbed area in middle\and purulent appearing material under blister. minimall TTP    Psychiatric: She has a normal mood and affect. Her behavior is normal.   Vitals reviewed.      Significant Labs: Blood Culture: No results for input(s): LABBLOO in the last 48 hours.  BMP: No results for input(s): GLU, NA, K, CL, CO2, BUN, CREATININE, CALCIUM, MG in the  last 48 hours.  CBC: No results for input(s): WBC, HGB, HCT, PLT in the last 48 hours.  CMP: No results for input(s): NA, K, CL, CO2, GLU, BUN, CREATININE, CALCIUM, PROT, ALBUMIN, BILITOT, ALKPHOS, AST, ALT, ANIONGAP, EGFRNONAA in the last 48 hours.    Invalid input(s): ESTGFAFRICA  Coagulation: No results for input(s): INR, APTT in the last 48 hours.    Invalid input(s): PT    Significant Imaging: I have reviewed all pertinent imaging results/findings within the past 24 hours.    Assessment/Plan:      * Bilateral lower leg cellulitis  Acute  US to evaluate for DVT-negative    Continue cefuroxime      Intention tremor  Chronic  Order BB per home dose       Moderate persistent asthma without complication  No acute exacerbation clinically; good air movement  Duonebs ordered      Foot ulcer, left  Consult ortho-debrided by ortho  Consult wound care    Continue cefuroxime; improving      Multiple falls  See above       Debility  Progressively getting worse  Associated with weakness and multiple falls  Consult PT and OT  Pt would greatly benefit from SNF for therapy-consult to      Consult dietician       Acute hypoxemic respiratory failure  Due to atelectasis likely. Encouraged incentive spirometry.  Asthma less likely given good air movement and treatment with bronchodilators has been continued.       Constipation  Improved.  Cont stool softeners.      VTE Risk Mitigation         Ordered     enoxaparin injection 40 mg  Daily     Route:  Subcutaneous        02/24/17 0226     Medium Risk of VTE  Once      02/24/17 0226          Jose Luis Alfredo MD  Department of Hospital Medicine   [facility]

## 2017-02-28 NOTE — PLAN OF CARE
Problem: Nutrition, Imbalanced: Inadequate Oral Intake (Adult)  Goal: Identify Related Risk Factors and Signs and Symptoms  Related risk factors and signs and symptoms are identified upon initiation of Human [employer] Clinical Practice Guideline (CPG)   Recommendation/Intervention: 1) Continue cardiac diet 2) Add Boost Plus with meals 3) Consider adding calcium with vitamin D re pt concern for bone health  Goals: 1) Meal intake >= 75% 2) Utilize supplement  Nutrition Goal Status: progressing

## 2017-02-28 NOTE — ASSESSMENT & PLAN NOTE
Due to atelectasis likely. Encouraged incentive spirometry.  Asthma less likely given good air movement and treatment with bronchodilators has been continued.

## 2017-02-28 NOTE — PROGRESS NOTES
1110am CM was called to answer questions from son, Jeronimo. Jeronimo wanted to know what happened with patient going to inpatient rehab. CM explained to son that she would review patient chart and followup with him later today.  340pm CM explained to him that patient was approved for SNF at [facility] which is family's first choice. PHN gave auth. SELENE spoke with Dr. Alfredo and he said that OT/PT recommended SNF not inpatient rehab so that is why that route was taken. After SELENE explained , Jeronimo, patient son is fine with patient discharging to [facility].

## 2017-03-01 VITALS
TEMPERATURE: 98 F | HEART RATE: 70 BPM | SYSTOLIC BLOOD PRESSURE: 117 MMHG | WEIGHT: 164 LBS | DIASTOLIC BLOOD PRESSURE: 55 MMHG | HEIGHT: 62 IN | RESPIRATION RATE: 18 BRPM | OXYGEN SATURATION: 97 % | BODY MASS INDEX: 30.18 KG/M2

## 2017-03-01 PROCEDURE — 94640 AIRWAY INHALATION TREATMENT: CPT

## 2017-03-01 PROCEDURE — 97530 THERAPEUTIC ACTIVITIES: CPT

## 2017-03-01 PROCEDURE — 94761 N-INVAS EAR/PLS OXIMETRY MLT: CPT

## 2017-03-01 PROCEDURE — 97116 GAIT TRAINING THERAPY: CPT

## 2017-03-01 PROCEDURE — 25000003 PHARM REV CODE 250: Performed by: HOSPITALIST

## 2017-03-01 PROCEDURE — 99232 SBSQ HOSP IP/OBS MODERATE 35: CPT | Mod: ,,, | Performed by: PODIATRIST

## 2017-03-01 PROCEDURE — 27000221 HC OXYGEN, UP TO 24 HOURS

## 2017-03-01 PROCEDURE — 25000242 PHARM REV CODE 250 ALT 637 W/ HCPCS: Performed by: HOSPITALIST

## 2017-03-01 PROCEDURE — 63600175 PHARM REV CODE 636 W HCPCS: Performed by: NURSE PRACTITIONER

## 2017-03-01 PROCEDURE — 25000003 PHARM REV CODE 250: Performed by: NURSE PRACTITIONER

## 2017-03-01 RX ORDER — DOCUSATE SODIUM 100 MG/1
100 CAPSULE, LIQUID FILLED ORAL 2 TIMES DAILY
Refills: 0 | COMMUNITY
Start: 2017-03-01 | End: 2018-05-29

## 2017-03-01 RX ORDER — CLONAZEPAM 0.5 MG/1
TABLET ORAL
Qty: 180 TABLET | Refills: 0 | Status: SHIPPED | OUTPATIENT
Start: 2017-03-01 | End: 2017-07-18

## 2017-03-01 RX ORDER — TRIAMCINOLONE ACETONIDE 1 MG/G
OINTMENT TOPICAL 2 TIMES DAILY
Qty: 30 G | Refills: 0 | Status: SHIPPED | OUTPATIENT
Start: 2017-03-01 | End: 2017-10-30 | Stop reason: SDUPTHER

## 2017-03-01 RX ORDER — CEFUROXIME AXETIL 250 MG/1
250 TABLET ORAL EVERY 12 HOURS
Qty: 14 TABLET | Refills: 0 | Status: SHIPPED | OUTPATIENT
Start: 2017-03-01 | End: 2017-03-08

## 2017-03-01 RX ORDER — FUROSEMIDE 20 MG/1
20 TABLET ORAL DAILY
Qty: 30 TABLET | Refills: 11 | Status: SHIPPED | OUTPATIENT
Start: 2017-03-01 | End: 2017-03-29 | Stop reason: SDUPTHER

## 2017-03-01 RX ADMIN — ENOXAPARIN SODIUM 40 MG: 100 INJECTION SUBCUTANEOUS at 11:03

## 2017-03-01 RX ADMIN — ASPIRIN 81 MG: 81 TABLET, COATED ORAL at 09:03

## 2017-03-01 RX ADMIN — DOCUSATE SODIUM 100 MG: 100 CAPSULE, LIQUID FILLED ORAL at 09:03

## 2017-03-01 RX ADMIN — CEFUROXIME AXETIL 250 MG: 250 TABLET ORAL at 09:03

## 2017-03-01 RX ADMIN — FLUTICASONE FUROATE AND VILANTEROL TRIFENATATE 1 PUFF: 100; 25 POWDER RESPIRATORY (INHALATION) at 07:03

## 2017-03-01 RX ADMIN — OXYBUTYNIN CHLORIDE 5 MG: 5 TABLET ORAL at 09:03

## 2017-03-01 RX ADMIN — FLUTICASONE PROPIONATE 2 SPRAY: 50 SPRAY, METERED NASAL at 09:03

## 2017-03-01 RX ADMIN — IPRATROPIUM BROMIDE AND ALBUTEROL SULFATE 3 ML: .5; 3 SOLUTION RESPIRATORY (INHALATION) at 01:03

## 2017-03-01 RX ADMIN — IPRATROPIUM BROMIDE AND ALBUTEROL SULFATE 3 ML: .5; 3 SOLUTION RESPIRATORY (INHALATION) at 07:03

## 2017-03-01 RX ADMIN — TRIAMCINOLONE ACETONIDE: 1 OINTMENT TOPICAL at 09:03

## 2017-03-01 NOTE — PROGRESS NOTES
Cm spoke with daughter Liane about pt being discharged to [facility] Today.  Cm spoke with Salena at [facility].  Faxed failed. I informed her that I refaxed the information and she should receive it shortly.  Nurse can call report into Celina at [facility], pt going into  # 10-B.  Call report to Celina at [phone].    2:59  Shimon received a call from Marina at [facility].  The nurse can call report to Elijah at [phone] (change of shift) in ten mintues.  EMILY Haq notified.

## 2017-03-01 NOTE — DISCHARGE INSTRUCTIONS
[facility] Facility Transfer Orders        Admit to: [facility]    Diagnoses:   Active Hospital Problems    Diagnosis  POA    *Bilateral lower leg cellulitis [L03.116, L03.115]  Yes    Acute hypoxemic respiratory failure [J96.01]  No    Constipation [K59.00]  Yes    Foot ulcer, left [L97.529]  Yes    Multiple falls [R29.6]  Not Applicable    Debility [R53.81]  Yes    Moderate persistent asthma without complication [J45.40]  Yes    Intention tremor [G25.2]  Yes      Resolved Hospital Problems    Diagnosis Date Resolved POA   No resolved problems to display.     Allergies:   Review of patient's allergies indicates:   Allergen Reactions    Epinephrine      Seizure like activity      Pcn [penicillins]        Code Status: full    Vitals: Every shift       Diet: diabetic diet: 1800 calorie    Activity: Activity as tolerated    Nursing Precautions: Aspiration , Fall and Pressure ulcer prevention    Bed/Surface: Low Air Loss    Consults: PT to evaluate and treat- 5 times a week and OT to evaluate and treat- 5 times a week    Oxygen: room air    Dialysis: Patient is not on dialysis.     Labs:   accucheck AC and Hs  ISS  Pending Diagnostic Studies:     None            Miscellaneous Care:   Wound care left elbow-cleanse daily and apply triamcinolone ointment 0.1% bid-then apply nonstick telfa and guase   Wound care left foot -cleanse daily with wound cleanser daily and apply mepilex border guase         Medications: Discontinue all previous medication orders, if any. See new list below.  Current Discharge Medication List      START taking these medications    Details   cefUROXime (CEFTIN) 250 MG tablet Take 1 tablet (250 mg total) by mouth every 12 (twelve) hours.  Qty: 14 tablet, Refills: 0      docusate sodium (COLACE) 100 MG capsule Take 1 capsule (100 mg total) by mouth 2 (two) times daily.  Refills: 0      triamcinolone acetonide 0.1% (KENALOG) 0.1 % ointment Apply topically 2 (two)  times daily. To left elbow after cleansing with wound cleanser. Then apply non-stick telfa and kerlix guase until healed  Qty: 30 g, Refills: 0         CONTINUE these medications which have CHANGED    Details   clonazePAM (KLONOPIN) 0.5 MG tablet 0.25 mg po bid  Qty: 180 tablet, Refills: 0    Associated Diagnoses: 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      furosemide (LASIX) 20 MG tablet Take 1 tablet (20 mg total) by mouth once daily.  Qty: 30 tablet, Refills: 11    Associated Diagnoses: Bilateral edema of lower extremity         CONTINUE these medications which have NOT CHANGED    Details   albuterol (PROAIR HFA) 90 mcg/actuation inhaler Inhale 2 puffs into the lungs every 6 (six) hours as needed.  Qty: 18 g, Refills: 5    Comments: Pt requesting 90 day supply.   Associated Diagnoses: Intrinsic asthma, mild intermittent, uncomplicated      aspirin (ECOTRIN) 81 MG EC tablet Take 1 tablet (81 mg total) by mouth once daily.  Refills: 0      budesonide-formoterol 80-4.5 mcg (SYMBICORT) 80-4.5 mcg/actuation HFAA Inhale 2 puffs into the lungs once daily.  Qty: 10.2 g, Refills: 11    Associated Diagnoses: Intrinsic asthma, mild intermittent, uncomplicated      fluticasone (FLONASE) 50 mcg/actuation nasal spray 2 sprays by Each Nare route once daily.  Qty: 48 g, Refills: 3    Associated Diagnoses: Rhinitis, chronic      polyethylene glycol (GLYCOLAX) 17 gram/dose powder Take 17 g by mouth 2 (two) times daily as needed (Constipation).  Qty: 289 g, Refills: 0      propranolol (INDERAL) 20 MG tablet Take 1 tablet (20 mg total) by mouth 2 (two) times daily. For tremors  Qty: 180 tablet, Refills: 1    Associated Diagnoses: Intention tremor      albuterol-ipratropium 2.5mg-0.5mg/3mL (DUO-NEB) 0.5 mg-3 mg(2.5 mg base)/3 mL nebulizer solution Take 3 mLs by nebulization every 6 (six) hours as needed for Wheezing.  Qty: 90 vial, Refills: 5    Associated Diagnoses: Intrinsic asthma, unspecified asthma severity,  uncomplicated      atorvastatin (LIPITOR) 10 MG tablet Take 1 tablet (10 mg total) by mouth every evening.  Qty: 30 tablet, Refills: 0      potassium chloride (KLOR-CON) 20 mEq Pack Take 20 mEq by mouth once daily.  Qty: 30 packet, Refills: 11    Comments: Please discontinue potassium tablets as patient is unable to swallow them.      trospium (SANCTURA) 20 mg Tab tablet Take 20 mg by mouth 2 (two) times daily.           Follow up:   Follow-up Information     Follow up with [facility].    Specialties:  Nursing Home Agency, SNF Agency    Contact information:    [address] [place] [address]  [phone]          Follow up with Catrachito Mart MD In 1 week.    Specialties:  Family Medicine, Internal Medicine    Contact information:    [address] [place] [address] [phone]

## 2017-03-01 NOTE — PROGRESS NOTES
Shimon tried calling Salena at [phone] from [facility] to inform her that I have discharge orders and will be faxing it over shortly.    12:45 Cm faxed over the following information to [facility]ait[facility]ANNON[facility]ait[facility] [phone].  Cm left message on answering machine.  The following was faxed to [phone]:  Demographic sheet, face sheet, orders, MAR,AVS, PSARR,142, S/S, Chest x-ray and TB results.    1:06 Confirmation failed.  Cm refaxed information to Marifer Mayfield at [phone]

## 2017-03-01 NOTE — PROGRESS NOTES
03/01/17 1302   Final Note   Assessment Type Final Discharge Note   Discharge Disposition SNF  ([facility])   Discharge planning education complete? Yes

## 2017-03-01 NOTE — CONSULTS
Inpatient consult to Podiatry  Consult performed by: ALEXANDREA KIDD  Consult ordered by: MASSIEL ORTIZ        Consult Note  Podiatry    Consult Requested By: Jocelyn Lindquist MD  Reason for Consult: ulcer left foot    SUBJECTIVE     History of Present Illness:  Jennifer Diaz is a 84 y.o. female who presents today with the chief complaint of  ulcer left foot. Gradual onset, worsening and improving in cycles past several years, aggravated by increased weight bearing, shoe gear, pressure.  No previous medical treatment.  No self care.   Scheduled Meds:   albuterol-ipratropium 2.5mg-0.5mg/3mL  3 mL Nebulization Q6H    aspirin  81 mg Oral Daily    atorvastatin  10 mg Oral QHS    cefUROXime  250 mg Oral Q12H    clonazePAM  0.5 mg Oral QHS    docusate sodium  100 mg Oral BID    fluticasone  2 spray Each Nare Daily    fluticasone-vilanterol  1 puff Inhalation Daily    oxybutynin  5 mg Oral BID    propranolol  20 mg Oral BID    triamcinolone acetonide 0.1%   Topical BID    tuberculin  5 Units Intradermal Once     Continuous Infusions:   PRN Meds:albuterol-ipratropium 2.5mg-0.5mg/3mL, hydrocodone-apap 2.5-108 MG/5 ML, insulin aspart    Review of patient's allergies indicates:   Allergen Reactions    Epinephrine      Seizure like activity      Pcn [penicillins]         Past Medical History:   Diagnosis Date    Acid reflux     Family history of breast cancer     Family history of diabetes mellitus     Intrinsic asthma     Psoriasis      Past Surgical History:   Procedure Laterality Date    DILATION AND CURETTAGE OF UTERUS      x2    TONSILLECTOMY, ADENOIDECTOMY  as child    TOTAL ABDOMINAL HYSTERECTOMY W/ BILATERAL SALPINGOOPHORECTOMY  6/2012    UMBILICAL HERNIA REPAIR       Family History   Problem Relation Age of Onset    Cancer Mother      pancreas    Heart attack Father     Cancer Sister      breast    Diabetes Brother      Social History   Substance Use Topics    Smoking status:  Never Smoker    Smokeless tobacco: Never Used    Alcohol use No       Review of Systems:  Constitutional: no fever or chills, pain well controlled  Cardiovascular: no chest pain or palpitations  Integument/Breast: no rash or pruritis, history of prior ulcerations  Musculoskeletal: no arthralgias or myalgias  Neurological: no history of numbness or paresthesias in the feet    OBJECTIVE     Vital Signs (Most Recent):  Temp: 98.1 °F (36.7 °C) (03/01/17 1044)  Pulse: 89 (03/01/17 1044)  Resp: 20 (03/01/17 1044)  BP: (!) 126/59 (03/01/17 1044)  SpO2: 96 % (03/01/17 1044)    Vital Signs Range (Last 24H):  Temp:  [98 °F (36.7 °C)-98.6 °F (37 °C)]   Pulse:  [73-98]   Resp:  [16-20]   BP: (104-137)/(51-63)   SpO2:  [90 %-97 %]     Physical Exam:  GENERAL: alert, cooperative, no distress  BODY HABITUS: normal  VASCULAR: Dorsalis Pedis: Left: 2+ (normal) Right: 2+ (normal)       Posterior Tibialis: Left: 1+ (weak) Right: 1+ (weak)       Capillary Fill Time: Left: < 2 seconds Right: < 2 seconds       Edema: Left: none Right: none       Varicosities: Left: none Right: none  NEURO: Sharp/Dull: Left: mild deficit Right: mild deficit       SWMF: Left: decreased Right: decreased  ORTHO: General Alignment: Left: multiple digital deformities Right: multiple digital deformities       Pedal Joint ROM: Left: adequate Right: adequate       Ankle Joint ROM: Left: adequate Right: adequate       Foot Type: Left: pes planus Right: pes planus       Strength: Ankle dorsiflexion (L4 and L5): Left: 4/5 Right: 3/5            Ankle plantarflexion (S1): Left: 4/5 Right: 3/5            Inverters/Everters: Left: 4/5 Right: 3/5  DERM: Hair: Left: decreased Right: decreased       Interdigital Spaces: Left: clean, dry and without evidence of break in skin integrity Right: clean, dry and without evidence of break in skin integrity    Laboratory:  CBC:   Recent Labs  Lab 02/23/17  2154   WBC 6.30   RBC 4.60   HGB 12.4   HCT 39.1      MCV 85   MCH  27.0   MCHC 31.7*     BMP:   Recent Labs  Lab 02/23/17 2154   *      K 3.5   CL 94*   CO2 32*   BUN 21   CREATININE 0.7   CALCIUM 9.9   MG 1.6     CMP:   Recent Labs  Lab 02/23/17 2154   *   CALCIUM 9.9   ALBUMIN 3.3*   PROT 7.0      K 3.5   CO2 32*   CL 94*   BUN 21   CREATININE 0.7   ALKPHOS 68   ALT 22   AST 41*   BILITOT 0.5     ESR:   Recent Labs  Lab 02/23/17 2154   SEDRATE 37*     CRP:   Recent Labs  Lab 02/23/17 2154   CRP 31.1*     Coagulation: No results for input(s): INR, APTT in the last 168 hours.    Invalid input(s): PT  Microbiology Results (last 7 days)     ** No results found for the last 168 hours. **          Diagnostic Results:  X-Ray: reviewed    Clinical Findings:  Wound plantar medial arch left foot inferiorly closed today, epithelialized  without ulceration, drainage, pus, tracking, fluctuance, malodor, or cardinal signs infection.  Hyperkeratosis over area is stable without pre ulceration presently.    ASSESSMENT/PLAN     Assessment:  closed wound left foot    Plan:  Dressing(s): dressing change performed at bedside and dry sterile gauze to cushion.  Offloading: bedrest and monitor heels daily for signs of pressure.  Activity to tolerance (from foot perspective) with fall precautions.  follow for offloading prescription and at risk foot care Dr. Chris on discharge.

## 2017-03-01 NOTE — PROGRESS NOTES
02/28/17 1932   Patient Assessment/Suction   Level of Consciousness (AVPU) alert   Respiratory Effort Normal   All Lung Fields Breath Sounds diminished   PRE-TX-O2-ETCO2   O2 Device (Oxygen Therapy) nasal cannula   $ Is the patient on Oxygen? Yes   Flow (L/min) 2   SpO2 97 %   Pulse Oximetry Type Intermittent   Pulse 98   Resp 16   Aerosol Therapy   $ Aerosol Therapy Charges Aerosol Treatment   Respiratory Treatment Status given   SVN/Inhaler Treatment Route mask   Position During Treatment HOB at 30-45 degrees   Patient Tolerance good   Post-Treatment   Post-treatment Heart Rate (beats/min) 75   Post-treatment Resp Rate (breaths/min) 16   All Fields Breath Sounds unchanged   Incentive Spirometer   $ Incentive Spirometer Charges done with encouragement   Administration (Incentive Spirometer) done with encouragement   Number of Repetitions (Incentive Spirometer) 5   Level (mL) (Incentive Spirometer) 500   Patient Tolerance fair

## 2017-03-01 NOTE — PT/OT/SLP PROGRESS
Physical Therapy  Treatment    Jennifer Diaz   MRN: [id number]   Admitting Diagnosis: Bilateral lower leg cellulitis    PT Received On: 17  PT Start Time: 1100     PT Stop Time: 1120    PT Total Time (min): 20 min       Billable Minutes:  Gait [id number] and Therapeutic Activity 10min    Treatment Type: Treatment  PT/PTA: PTA     PTA Visit Number: 2       General Precautions: Standard, fall  Orthopedic Precautions: N/A   Braces:           Subjective:  Communicated with nurse Eun prior to session.  Agreed to mobilize.    Pain Ratin/10                   Objective:   Patient found with: oxygen, telemetry    Functional Mobility:  Bed Mobility:   Rolling/Turning to Left: Minimum assistance  Rolling/Turning Right: Minimum assistance  Scooting/Bridging: Minimum Assistance  Supine to Sit: Minimum Assistance  Sit to Supine: Minimum Assistance    Transfers:  Sit <> Stand Assistance: Minimum Assistance  Sit <> Stand Assistive Device: Rolling Walker    Gait:   Gait Distance: 20' x 2 with rest period seated between each.  Assistance 1: Moderate assistance  Gait Assistive Device: Rolling walker  Gait Pattern: 3-point gait  Gait Deviation(s): decreased deya, decreased velocity of limb motion, decreased step length    Stairs:      Balance:   Static Sit: GOOD-: Takes MODERATE challenges from all directions but inconsistently  Dynamic Sit: GOOD-: Maintains balance through MODERATE excursions of active trunk movement,     Static Stand: FAIR+: Takes MINIMAL challenges from all directions  Dynamic stand: FAIR: Needs CONTACT GUARD during gait     Therapeutic Activities and Exercises:    Transferred to sitting EOB with A, very slowly.  Ambulated 20' x 2 with rw and Mod A. Transferred onto /off commode with A, required A to pull pants down / up. Ambulates slowly with forward posture and O2 attached.  Returned to bed , positioned R sidelying.     AM-PAC 6 CLICK MOBILITY  How much help from another person does this  patient currently need?   1 = Unable, Total/Dependent Assistance  2 = A lot, Maximum/Moderate Assistance  3 = A little, Minimum/Contact Guard/Supervision  4 = None, Modified [place]/Independent         AM-PAC Raw Score CMS G-Code Modifier Level of Impairment Assistance   6 % Total / Unable   7 - 9 CM 80 - 100% Maximal Assist   10 - 14 CL 60 - 80% Moderate Assist   15 - 19 CK 40 - 60% Moderate Assist   20 - 22 CJ 20 - 40% Minimal Assist   23 CI 1-20% SBA / CGA   24 CH 0% Independent/ Mod I     Patient left right sidelying with all lines intact, call button in reach and nurses Eun and Ramonita present.    Assessment:  Jennifer Diaz is a 84 y.o. female with a medical diagnosis of Bilateral lower leg cellulitis and presents with weakness, limited endurance, limited self care skills.    Rehab identified problem list/impairments: Rehab identified problem list/impairments: weakness, gait instability, impaired endurance, impaired functional mobilty    Rehab potential is fair.    Activity tolerance: Fair    Discharge recommendations: Discharge Facility/Level Of Care Needs: nursing facility, skilled     Barriers to discharge: Barriers to Discharge: Decreased caregiver support    Equipment recommendations: Equipment Needed After Discharge: none     GOALS:   Physical Therapy Goals     Not on file      Multidisciplinary Problems (Resolved)        Problem: Physical Therapy Goal    Goal Priority Disciplines Outcome Goal Variances Interventions   Physical Therapy Goal   (Resolved)     PT/OT, PT Outcome(s) achieved     Description:  Goals to be met by: 3/04/2017     Patient will increase functional independence with mobility by performin). Supine to sit with Modified [place]  2). Sit to supine with Modified [place]  3). Sit to stand transfer with Modified [place]  4). Gait  x  > 50 feet with Modified [place] using Rolling Walker.                 PLAN:    Patient to be seen daily  to  address the above listed problems via gait training, therapeutic activities, therapeutic exercises  Plan of Care expires: 03/04/17  Plan of Care reviewed with: patient         Tiffany Silvino, JESUS  03/01/2017

## 2017-03-01 NOTE — SUBJECTIVE & OBJECTIVE
Interval History: improved hypoxemia with use of incentive spirometry, having BM x 3 overnight and feels much better. Await SNF placement.     Review of Systems   Respiratory: Negative.    Cardiovascular: Negative.    Gastrointestinal: Negative for abdominal pain and constipation.     Objective:     Vital Signs (Most Recent):  Temp: 98 °F (36.7 °C) (02/28/17 2000)  Pulse: 94 (02/28/17 2000)  Resp: 18 (02/28/17 2000)  BP: (!) 104/54 (02/28/17 2000)  SpO2: 96 % (02/28/17 2000) Vital Signs (24h Range):  Temp:  [97.9 °F (36.6 °C)-98.3 °F (36.8 °C)] 98 °F (36.7 °C)  Pulse:  [57-98] 94  Resp:  [14-18] 18  SpO2:  [94 %-98 %] 96 %  BP: (103-116)/(50-67) 104/54     Weight: 74.4 kg (164 lb)  Body mass index is 30 kg/(m^2).    Intake/Output Summary (Last 24 hours) at 02/28/17 2255  Last data filed at 02/28/17 2123   Gross per 24 hour   Intake              960 ml   Output                0 ml   Net              960 ml      Physical Exam   Constitutional: She is oriented to person, place, and time. She appears well-developed. No distress.   HENT:   Head: Normocephalic and atraumatic.   Mouth/Throat: Oropharynx is clear and moist.   Eyes: Conjunctivae are normal. Pupils are equal, round, and reactive to light. Right eye exhibits no discharge. Left eye exhibits no discharge. No scleral icterus.   Neck: Normal range of motion. Neck supple. No JVD present. No thyromegaly present.   Cardiovascular: Normal rate and regular rhythm.  Exam reveals no gallop and no friction rub.    No murmur heard.  Pulmonary/Chest: Effort normal. No respiratory distress. She has no wheezes. She has rales (bibasilar rales). She exhibits no tenderness.   Abdominal: Soft. Bowel sounds are normal. She exhibits no distension. There is no tenderness. There is no rebound and no guarding.   Lymphadenopathy:     She has no cervical adenopathy.   Neurological: She is alert and oriented to person, place, and time. She displays normal reflexes. No cranial nerve  deficit.   Skin: Skin is warm and dry. She is not diaphoretic. There is erythema.   Right foot-intact blister with scabbed area in middle\and purulent appearing material under blister. minimall TTP    Psychiatric: She has a normal mood and affect. Her behavior is normal.   Vitals reviewed.      Significant Labs: Blood Culture: No results for input(s): LABBLOO in the last 48 hours.  BMP: No results for input(s): GLU, NA, K, CL, CO2, BUN, CREATININE, CALCIUM, MG in the last 48 hours.  CBC: No results for input(s): WBC, HGB, HCT, PLT in the last 48 hours.  CMP: No results for input(s): NA, K, CL, CO2, GLU, BUN, CREATININE, CALCIUM, PROT, ALBUMIN, BILITOT, ALKPHOS, AST, ALT, ANIONGAP, EGFRNONAA in the last 48 hours.    Invalid input(s): ESTGFAFR[place]  Cardiac Markers: No results for input(s): CKMB, MYOGLOBIN, BNP, TROPISTAT in the last 48 hours.  Coagulation: No results for input(s): INR, APTT in the last 48 hours.    Invalid input(s): PT  All pertinent labs within the past 24 hours have been reviewed.    Significant Imaging: I have reviewed all pertinent imaging results/findings within the past 24 hours.

## 2017-03-01 NOTE — ASSESSMENT & PLAN NOTE
Due to atelectasis likely. Encouraged incentive spirometry.  Asthma less likely given good air movement and treatment with bronchodilators has been continued.     Improving with usage of spirometry.

## 2017-03-01 NOTE — PLAN OF CARE
Problem: Patient Care Overview  Goal: Plan of Care Review  Aerosol Q 6 and Q 6 prn MDI Q day NC 2 lpm

## 2017-03-01 NOTE — PROGRESS NOTES
Cm left message on answering machine for daughter Liane [phone], that her mother will be going to [facility] today for SNF.

## 2017-03-01 NOTE — PROGRESS NOTES
[facility] Medicine  Progress Note    Patient Name: Jennifer Diaz  MRN: [id number]  Patient Class: IP- Inpatient   Admission Date: 2/23/2017  Length of Stay: 3 days  Attending Physician: Jose Luis Alfredo MD  Primary Care Provider: Catrachito Mart MD        Subjective:     Principal Problem:Bilateral lower leg cellulitis    HPI:  Ms. Diaz is an 83 yo female admitted to the services of [facility] medicine via the ER for right foot wound, bilateral lower leg cellulitis and multiple falls. Pt states that she started falling on February 1st. Pt is confident that she has not passed out causing any 3 of the falls so far this month. Pt states that her legs have been swollen since starting a medicine for her bladder. States that legs have been red for only about two weeks. Pt is not sure how long she has had a sore on the right foot. Pt lives alone. Son lives nearby but is only able to visit weekly due to work. History of asthma, uses an inhaler at home. Other past medical history as below.      Hospital Course:  Falls-eval by PT/OT. No arrythmias or syncopal episodes. Consult SW for snf placement.   Discussed w son    Cellulitis-on teflaro; improving redness.   Right foot wound- intact blister with purulent dc and scab. Orthopedic consulted, I and D and debrided with minimal purulent material expressed and no odor. Wound care ordered.     Asthma-stable; prn nebulizer treatments.       Interval History: improved hypoxemia with use of incentive spirometry, having BM x 3 overnight and feels much better. Await SNF placement.     Review of Systems   Respiratory: Negative.    Cardiovascular: Negative.    Gastrointestinal: Negative for abdominal pain and constipation.     Objective:     Vital Signs (Most Recent):  Temp: 98 °F (36.7 °C) (02/28/17 2000)  Pulse: 94 (02/28/17 2000)  Resp: 18 (02/28/17 2000)  BP: (!) 104/54 (02/28/17 2000)  SpO2: 96 % (02/28/17 2000) Vital Signs (24h Range):  Temp:  [97.9  °F (36.6 °C)-98.3 °F (36.8 °C)] 98 °F (36.7 °C)  Pulse:  [57-98] 94  Resp:  [14-18] 18  SpO2:  [94 %-98 %] 96 %  BP: (103-116)/(50-67) 104/54     Weight: 74.4 kg (164 lb)  Body mass index is 30 kg/(m^2).    Intake/Output Summary (Last 24 hours) at 02/28/17 2258  Last data filed at 02/28/17 2123   Gross per 24 hour   Intake              960 ml   Output                0 ml   Net              960 ml      Physical Exam   Constitutional: She is oriented to person, place, and time. She appears well-developed. No distress.   HENT:   Head: Normocephalic and atraumatic.   Mouth/Throat: Oropharynx is clear and moist.   Eyes: Conjunctivae are normal. Pupils are equal, round, and reactive to light. Right eye exhibits no discharge. Left eye exhibits no discharge. No scleral icterus.   Neck: Normal range of motion. Neck supple. No JVD present. No thyromegaly present.   Cardiovascular: Normal rate and regular rhythm.  Exam reveals no gallop and no friction rub.    No murmur heard.  Pulmonary/Chest: Effort normal. No respiratory distress. She has no wheezes. She has rales (bibasilar rales). She exhibits no tenderness.   Abdominal: Soft. Bowel sounds are normal. She exhibits no distension. There is no tenderness. There is no rebound and no guarding.   Lymphadenopathy:     She has no cervical adenopathy.   Neurological: She is alert and oriented to person, place, and time. She displays normal reflexes. No cranial nerve deficit.   Skin: Skin is warm and dry. She is not diaphoretic. There is erythema.   Right foot-intact blister with scabbed area in middle\and purulent appearing material under blister. minimall TTP    Psychiatric: She has a normal mood and affect. Her behavior is normal.   Vitals reviewed.      Significant Labs: Blood Culture: No results for input(s): LABBLOO in the last 48 hours.  BMP: No results for input(s): GLU, NA, K, CL, CO2, BUN, CREATININE, CALCIUM, MG in the last 48 hours.  CBC: No results for input(s): WBC,  HGB, HCT, PLT in the last 48 hours.  CMP: No results for input(s): NA, K, CL, CO2, GLU, BUN, CREATININE, CALCIUM, PROT, ALBUMIN, BILITOT, ALKPHOS, AST, ALT, ANIONGAP, EGFRNONAA in the last 48 hours.    Invalid input(s): ESTGFAFRICA  Cardiac Markers: No results for input(s): CKMB, MYOGLOBIN, BNP, TROPISTAT in the last 48 hours.  Coagulation: No results for input(s): INR, APTT in the last 48 hours.    Invalid input(s): PT  All pertinent labs within the past 24 hours have been reviewed.    Significant Imaging: I have reviewed all pertinent imaging results/findings within the past 24 hours.    Assessment/Plan:      * Bilateral lower leg cellulitis  Acute  US to evaluate for DVT-negative    Continue cefuroxime      Intention tremor  Chronic  Order BB per home dose       Moderate persistent asthma without complication  No acute exacerbation clinically; good air movement  Duonebs ordered      Foot ulcer, left  Consult ortho-debrided by ortho  Consult wound care    Continue cefuroxime; improving      Multiple falls  See above       Debility  Progressively getting worse  Associated with weakness and multiple falls  Consult PT and OT  Pt would greatly benefit from SNF for therapy-consult to SW     Consult dietician       Acute hypoxemic respiratory failure  Due to atelectasis likely. Encouraged incentive spirometry.  Asthma less likely given good air movement and treatment with bronchodilators has been continued.     Improving with usage of spirometry.      Constipation  Resolved with stool softeners.       VTE Risk Mitigation         Ordered     enoxaparin injection 40 mg  Daily     Route:  Subcutaneous        02/24/17 0226     Medium Risk of VTE  Once      02/24/17 0226          Jose Luis Alfredo MD  Department of Hospital Medicine   [facility]

## 2017-03-01 NOTE — PLAN OF CARE
Problem: Patient Care Overview  Goal: Plan of Care Review  Outcome: Ongoing (interventions implemented as appropriate)  Patient aao x 4. Denies pain during day. Ambulates to bedside commode. Plan of care reviewed with patient. Verbalized understanding. Patient remained free from fall and injury. Call light within reach and bed in lowest position.

## 2017-03-01 NOTE — PROGRESS NOTES
Shimon called Maxx with N to verify if the Authorization is still good for today's discharge.  Karlee said, yes.    9:57  Shimon spoke with Salena with [facility] ([phone]) and gave her the Authorization # [id number]. Shimon notified EMILY Haq.    Waiting on discharged orders pending.

## 2017-03-01 NOTE — NURSING
"Report called to marion kulkarni at [facility]. Rx and discharge instruction given to pt, pt states "nderstanding and she is very eager to start therapy", [facility] transport is here to retrieve pt at this time, heplock removed, catheher is intact, pt tolerated well, telemetry and nasal cannula removed, safety maintain  "

## 2017-03-01 NOTE — PLAN OF CARE
Problem: Patient Care Overview  Goal: Plan of Care Review  Outcome: Ongoing (interventions implemented as appropriate)  Pt had a decent night free from fall or injury.  Pt continues to be very particular but settled down and slept well throughout the night.  Pt denied pain and is voiding freely without difficulty.  No new skin breakdown noted with evaluation.  Pt repositioned frequently with several pillows in use.  PIV flushed, patent and SL.  VSS o2 sats maintained on 2 lpm NC.  Bed side rails raised x3 with call bell and personal belongings within reach; bed alarm in use. Pt is to D/C to [facility] today. Nurse rounded hourly to ensure pt safety.

## 2017-03-02 ENCOUNTER — PATIENT OUTREACH (OUTPATIENT)
Dept: ADMINISTRATIVE | Facility: CLINIC | Age: 82
End: 2017-03-02
Payer: MEDICARE

## 2017-03-06 ENCOUNTER — DOCUMENTATION ONLY (OUTPATIENT)
Dept: FAMILY MEDICINE | Facility: CLINIC | Age: 82
End: 2017-03-06

## 2017-03-06 NOTE — PROGRESS NOTES
Health Maintenance Due   Topic Date Due    Lipid Panel  09/07/1932    TETANUS VACCINE  09/07/1950    DEXA SCAN  09/07/1972    Zoster Vaccine  09/07/1992

## 2017-03-11 NOTE — DISCHARGE SUMMARY
[facility] Medicine  Discharge Summary      Patient Name: Jennifer Diaz  MRN: [id number]  Admission Date: 2/23/2017  Hospital Length of Stay: 4 days  Discharge Date and Time: 3/1/17  Attending Physician: No att. providers found   Discharging Provider: Jocelyn Vera MD  Primary Care Provider: Catrachito Mart MD      HPI:   Ms. Diaz is an 85 yo female admitted to the services of [facility] medicine via the ER for right foot wound, bilateral lower leg cellulitis and multiple falls. Pt states that she started falling on February 1st. Pt is confident that she has not passed out causing any 3 of the falls so far this month. Pt states that her legs have been swollen since starting a medicine for her bladder. States that legs have been red for only about two weeks. Pt is not sure how long she has had a sore on the right foot. Pt lives alone. Son lives nearby but is only able to visit weekly due to work. History of asthma, uses an inhaler at home. Other past medical history as below.      * No surgery found *      Indwelling Lines/Drains at time of discharge:   Lines/Drains/Airways          No matching active lines, drains, or airways        Hospital Course:   Falls-eval by PT/OT. No arrythmias or syncopal episodes. Consult  for snf placement. To [facility]   Discussed w son.    Cellulitis-on teflaro; improving redness. Transitioned to po ceftin. X 7 more dasy  Right foot wound- intact blister with purulent dc and scab. Orthopedic consulted, I and D and debrided with minimal purulent material expressed and no odor. Wound care ordered. -continue     Asthma-stable; prn nebulizer treatments.   DM ISS and accuchecks .         Consults:   Consults         Status Ordering Provider     Inpatient consult to Orthopedic Surgery  Once     Provider:  Paul Galvan MD    Completed JOCELYN VERA     Inpatient consult to Podiatry  Once     Provider:  Vijay Chris DPM    Completed ANGEL  MASSIEL RICO     Inpatient consult to Social Work/Case Management  Once     Provider:  (Not yet assigned)    Completed MASSIEL ORTIZ     Inpatient consult to Social Work/Case Management  Once     Provider:  (Not yet assigned)    Completed CARMELO VERA     IP consult to dietary  Once     Provider:  (Not yet assigned)    Completed MASSIEL ORTIZ          Significant Diagnostic Studies:   Results for YUDELKA MATSON (MRN [id number]) as of 3/10/2017 22:51   Ref. Range 2/21/2017 01:17 2/23/2017 21:54   WBC Latest Ref Range: 3.90 - 12.70 K/uL 5.80 6.30   RBC Latest Ref Range: 4.00 - 5.40 M/uL 4.73 4.60   Hemoglobin Latest Ref Range: 12.0 - 16.0 g/dL 13.1 12.4   Hematocrit Latest Ref Range: 37.0 - 48.5 % 39.8 39.1   MCV Latest Ref Range: 82 - 98 fL 84 85   MCH Latest Ref Range: 27.0 - 31.0 pg 27.7 27.0   MCHC Latest Ref Range: 32.0 - 36.0 % 32.9 31.7 (L)   RDW Latest Ref Range: 11.5 - 14.5 % 13.9 14.8 (H)   Platelets Latest Ref Range: 150 - 350 K/uL 234 218   Results for YUDELKA MATSON (MRN [id number]) as of 3/10/2017 22:51   Ref. Range 2/21/2017 01:17 2/23/2017 21:54   Sodium Latest Ref Range: 136 - 145 mmol/L 137 138   Potassium Latest Ref Range: 3.5 - 5.1 mmol/L 3.8 3.5   Chloride Latest Ref Range: 95 - 110 mmol/L 93 (L) 94 (L)   CO2 Latest Ref Range: 23 - 29 mmol/L 32 (H) 32 (H)   Anion Gap Latest Ref Range: 8 - 16 mmol/L 12 12   BUN, Bld Latest Ref Range: 8 - 23 mg/dL 19 21   Creatinine Latest Ref Range: 0.5 - 1.4 mg/dL 0.6 0.7   eGFR if non African American Latest Ref Range: >60 mL/min/1.73 m^2 >60 >60   eGFR if  Latest Ref Range: >60 mL/min/1.73 m^2 >60 >60   Glucose Latest Ref Range: 70 - 110 mg/dL 84 147 (H)   Calcium Latest Ref Range: 8.7 - 10.5 mg/dL 9.9 9.9   Phosphorus Latest Ref Range: 2.7 - 4.5 mg/dL  2.3 (L)   Magnesium Latest Ref Range: 1.6 - 2.6 mg/dL  1.6   Alkaline Phosphatase Latest Ref Range: 55 - 135 U/L 64 68   Total Protein Latest Ref Range: 6.0 - 8.4 g/dL 7.2  7.0   Albumin Latest Ref Range: 3.5 - 5.2 g/dL 3.4 (L) 3.3 (L)   Total Bilirubin Latest Ref Range: 0.1 - 1.0 mg/dL 0.7 0.5   AST Latest Ref Range: 10 - 40 U/L 37 41 (H)   ALT Latest Ref Range: 10 - 44 U/L 19 22   CRP Latest Ref Range: 0.0 - 8.2 mg/L  31.1 (H)   Results for LUTS, YUDELKA GALLO (MRN [id number]) as of 3/10/2017 22:51   Ref. Range 2/23/2017 21:54   TSH Latest Ref Range: 0.400 - 4.000 uIU/mL 2.133     Results for LUTS, YUDELKA GALLO (MRN [id number]) as of 3/10/2017 22:51   Ref. Range 2/23/2017 21:54   TSH Latest Ref Range: 0.400 - 4.000 uIU/mL 2.133       CT head  Impression           No acute intracranial process.   No significant change.  Chronic ischemic microvascular change and chronic left frontal lacunar infarct.         Pending Diagnostic Studies:     None        Final Active Diagnoses:    Diagnosis Date Noted POA    PRINCIPAL PROBLEM:  Bilateral lower leg cellulitis [L03.116, L03.115] 02/24/2017 Yes    Acute hypoxemic respiratory failure [J96.01] 02/27/2017 No    Constipation [K59.00] 02/27/2017 Yes    Foot ulcer, left [L97.529] 02/24/2017 Yes    Multiple falls [R29.6] 02/24/2017 Not Applicable    Debility [R53.81] 02/24/2017 Yes    Moderate persistent asthma without complication [J45.40] 03/15/2016 Yes    Intention tremor [G25.2] 03/15/2016 Yes      Problems Resolved During this Admission:    Diagnosis Date Noted Date Resolved POA      No new Assessment & Plan notes have been filed under this hospital service since the last note was generated.  Service: Hospital Medicine      Discharged Condition: good    Disposition: Skilled Nursing Facility    Follow Up:  Follow-up Information     Follow up with [facility].    Specialties:  Nursing Home Agency, SNF Agency    Contact information:    [address] [place] [address]  [phone]          Follow up with Catrachito Mart MD In 1 week.    Specialties:  Family Medicine, Internal Medicine    Contact information:    [address]  [facility] [address]  [phone]          Follow up with Vijay Chris DPM In 1 week.    Specialties:  Podiatry, Wound Care    Why:  For wound re-check    Contact information:    [address] [facility] [address]  [phone]          Patient Instructions:   No discharge procedures on file.  Medications:  Reconciled Home Medications:   Discharge Medication List as of 3/1/2017  3:21 PM      START taking these medications    Details   cefUROXime (CEFTIN) 250 MG tablet Take 1 tablet (250 mg total) by mouth every 12 (twelve) hours., Starting 3/1/2017, Until Wed 3/8/17, Normal      docusate sodium (COLACE) 100 MG capsule Take 1 capsule (100 mg total) by mouth 2 (two) times daily., Starting 3/1/2017, Until Discontinued, OTC      triamcinolone acetonide 0.1% (KENALOG) 0.1 % ointment Apply topically 2 (two) times daily. To left elbow after cleansing with wound cleanser. Then apply non-stick telfa and kerlix guase until healed, Starting 3/1/2017, Until Sat 3/11/17, Print         CONTINUE these medications which have CHANGED    Details   clonazePAM (KLONOPIN) 0.5 MG tablet 0.25 mg po bid, Print      furosemide (LASIX) 20 MG tablet Take 1 tablet (20 mg total) by mouth once daily., Starting 3/1/2017, Until Fri 3/31/17, Normal         CONTINUE these medications which have NOT CHANGED    Details   albuterol (PROAIR HFA) 90 mcg/actuation inhaler Inhale 2 puffs into the lungs every 6 (six) hours as needed., Starting 7/27/2016, Until Discontinued, Normal      albuterol-ipratropium 2.5mg-0.5mg/3mL (DUO-NEB) 0.5 mg-3 mg(2.5 mg base)/3 mL nebulizer solution Take 3 mLs by nebulization every 6 (six) hours as needed for Wheezing., Starting 5/4/2016, Until Discontinued, Normal      aspirin (ECOTRIN) 81 MG EC tablet Take 1 tablet (81 mg total) by mouth once daily., Starting 2/8/2017, Until Discontinued, OTC      atorvastatin (LIPITOR) 10 MG tablet Take 1 tablet (10 mg total) by mouth every evening., Starting 2/8/2017, Until  Discontinued, Normal      budesonide-formoterol 80-4.5 mcg (SYMBICORT) 80-4.5 mcg/actuation HFAA Inhale 2 puffs into the lungs once daily., Starting 7/27/2016, Until Discontinued, Normal      fluticasone (FLONASE) 50 mcg/actuation nasal spray 2 sprays by Each Nare route once daily., Starting 11/3/2016, Until Discontinued, Normal      polyethylene glycol (GLYCOLAX) 17 gram/dose powder Take 17 g by mouth 2 (two) times daily as needed (Constipation)., Starting 2/16/2017, Until Discontinued, Print      potassium chloride (KLOR-CON) 20 mEq Pack Take 20 mEq by mouth once daily., Starting 2/16/2017, Until Discontinued, Normal      propranolol (INDERAL) 20 MG tablet Take 1 tablet (20 mg total) by mouth 2 (two) times daily. For tremors, Starting 11/3/2016, Until Fri 11/3/17, Normal      trospium (SANCTURA) 20 mg Tab tablet Take 20 mg by mouth 2 (two) times daily., Until Discontinued, Historical Med           Time spent on the discharge of patient: 32 minutes    Jocelyn Lindquist MD  Department of Hospital Medicine  [facility]

## 2017-03-15 ENCOUNTER — OFFICE VISIT (OUTPATIENT)
Dept: PODIATRY | Facility: CLINIC | Age: 82
End: 2017-03-15
Payer: MEDICARE

## 2017-03-15 ENCOUNTER — HOSPITAL ENCOUNTER (OUTPATIENT)
Dept: RADIOLOGY | Facility: CLINIC | Age: 82
Discharge: HOME | End: 2017-03-15
Attending: PODIATRIST
Payer: MEDICARE

## 2017-03-15 VITALS — BODY MASS INDEX: 30.18 KG/M2 | WEIGHT: 164 LBS | HEIGHT: 62 IN

## 2017-03-15 DIAGNOSIS — L97.529 ULCER OF LEFT FOOT, WITH UNSPECIFIED SEVERITY: ICD-10-CM

## 2017-03-15 DIAGNOSIS — M21.42 ACQUIRED FLAT FOOT, LEFT: ICD-10-CM

## 2017-03-15 DIAGNOSIS — L97.529 ULCER OF LEFT FOOT, WITH UNSPECIFIED SEVERITY: Primary | ICD-10-CM

## 2017-03-15 PROCEDURE — 1157F ADVNC CARE PLAN IN RCRD: CPT | Mod: S$GLB,,, | Performed by: PODIATRIST

## 2017-03-15 PROCEDURE — 99499 UNLISTED E&M SERVICE: CPT | Mod: S$PBB,,, | Performed by: PODIATRIST

## 2017-03-15 PROCEDURE — 99213 OFFICE O/P EST LOW 20 MIN: CPT | Mod: S$GLB,,, | Performed by: PODIATRIST

## 2017-03-15 PROCEDURE — 73630 X-RAY EXAM OF FOOT: CPT | Mod: 26,LT,S$GLB, | Performed by: RADIOLOGY

## 2017-03-15 PROCEDURE — 99999 PR PBB SHADOW E&M-EST. PATIENT-LVL III: CPT | Mod: PBBFAC,,, | Performed by: PODIATRIST

## 2017-03-15 PROCEDURE — 1126F AMNT PAIN NOTED NONE PRSNT: CPT | Mod: S$GLB,,, | Performed by: PODIATRIST

## 2017-03-15 PROCEDURE — 1160F RVW MEDS BY RX/DR IN RCRD: CPT | Mod: S$GLB,,, | Performed by: PODIATRIST

## 2017-03-15 PROCEDURE — 73630 X-RAY EXAM OF FOOT: CPT | Mod: TC,PO,LT

## 2017-03-15 PROCEDURE — 1159F MED LIST DOCD IN RCRD: CPT | Mod: S$GLB,,, | Performed by: PODIATRIST

## 2017-03-15 RX ORDER — AMMONIUM LACTATE 12 G/100G
1 CREAM TOPICAL 2 TIMES DAILY
Qty: 140 G | Refills: 11 | Status: SHIPPED | OUTPATIENT
Start: 2017-03-15 | End: 2018-05-29

## 2017-03-15 NOTE — PROGRESS NOTES
Subjective:      Patient ID: Jennifer Diaz is a 84 y.o. female.    Chief Complaint: Foot Ulcer (hospital follow up)    Pain and closed ulcer bottom of arch left foot.  Gradual onset, worsening and improving in cycles over past several years, aggravated by increased weight bearing, shoe gear, pressure.  No previous medical treatment.  OTC pain med not helping.  Denies trauma and signs infection.      Review of Systems   Constitution: Negative for chills, diaphoresis, fever, malaise/fatigue and night sweats.   Cardiovascular: Negative for claudication, cyanosis, leg swelling and syncope.   Skin: Positive for poor wound healing and suspicious lesions. Negative for color change, dry skin, nail changes, rash and unusual hair distribution.   Musculoskeletal: Positive for arthritis. Negative for falls, joint pain, joint swelling, muscle cramps, muscle weakness and stiffness.   Gastrointestinal: Negative for constipation, diarrhea, nausea and vomiting.   Neurological: Negative for brief paralysis, disturbances in coordination, focal weakness, numbness, paresthesias, sensory change and tremors.           Objective:      Physical Exam   Constitutional: She appears well-developed and well-nourished. She is cooperative. No distress.   Cardiovascular:   Pulses:       Popliteal pulses are 2+ on the right side, and 2+ on the left side.        Dorsalis pedis pulses are 1+ on the right side, and 1+ on the left side.        Posterior tibial pulses are 1+ on the right side, and 1+ on the left side.   Capillary refill 3 seconds all toes/distal feet, all toes/both feet warm to touch.      Negative lymphadenopathy bilateral popliteal fossa and tarsal tunnel.      Negavie lower extremity edema bilateral.     Musculoskeletal:        Right ankle: Normal. She exhibits normal range of motion, no swelling, no ecchymosis, no deformity, no laceration and normal pulse. Achilles tendon normal. Achilles tendon exhibits no pain, no defect  and normal Kelley's test results.   Rigid collapsed flat foot left with prominent plantar bone without rockerbottom left.    Otherwise, Normal angle, base, station of gait. All ten toes without clubbing, cyanosis, or signs of ischemia.  No pain to palpation bilateral lower extremities.  Range of motion, stability, muscle strength, and muscle tone normal bilateral feet and legs.     Lymphadenopathy: No inguinal adenopathy noted on the right or left side.   Negative lymphadenopathy bilateral popliteal fossa and tarsal tunnel.   Neurological: She is alert. She has normal strength. She displays no atrophy and no tremor. No sensory deficit. She exhibits normal muscle tone. She displays no seizure activity. Gait normal.   Reflex Scores:       Patellar reflexes are 2+ on the right side and 2+ on the left side.       Achilles reflexes are 2+ on the right side and 2+ on the left side.  Negative tinel sign to percussion sural, superficial peroneal, deep peroneal, saphenous, and posterior tibial nerves right and left ankles and feet.     Skin: Skin is warm, dry and intact. No abrasion, no bruising, no burn, no ecchymosis, no laceration, no lesion and no rash noted. She is not diaphoretic. No cyanosis or erythema. No pallor. Nails show no clubbing.   Skin is normal age and health appropriate color, turgor, texture, and temperature bilateral lower extremities without ulceration, hyperpigmentation, discoloration, masses nodules or cords palpated.  No ecchymosis, erythema, edema, or cardinal signs of infection bilateral lower extremities.               Assessment:       Encounter Diagnoses   Name Primary?    Ulcer of left foot, with unspecified severity Yes    Acquired flat foot, left          Plan:       Jennifer was seen today for foot ulcer.    Diagnoses and all orders for this visit:    Ulcer of left foot, with unspecified severity  -     ORTHOTIC DEVICE (DME)  -     X-Ray Foot Complete Right; Future    Acquired flat foot,  left  -     ORTHOTIC DEVICE (DME)  -     X-Ray Foot Complete Right; Future    Other orders  -     ammonium lactate 12 % Crea; Apply 1 application topically 2 (two) times daily.      I counseled the patient on her conditions, their implications and medical management.        Rx x-rays, LacHydrin, custom orthotics.    Inspect feet multiple times daily for signs of recurrence/occurrence ulceration/infection.          Return if symptoms worsen or fail to improve.

## 2017-03-15 NOTE — MR AVS SNAPSHOT
[facility]  [address]  [place] [address]  Phone: [phone]                  Jennifer Diaz   3/15/2017 1:30 PM   Office Visit    Description:  Female : 1932   Provider:  Vijay Chris DPM   Department:  [facility]           Reason for Visit     Foot Ulcer           Diagnoses this Visit        Comments    Ulcer of left foot, with unspecified severity    -  Primary     Acquired flat foot, left                To Do List           Future Appointments        Provider Department Dept Phone    3/15/2017 2:45 PM [facility] XR1 [facility] [phone]      Goals (5 Years of Data)     None      Follow-Up and Disposition     Return if symptoms worsen or fail to improve.       These Medications        Disp Refills Start End    ammonium lactate 12 % Crea 140 g 11 3/15/2017     Apply 1 application topically 2 (two) times daily. - Topical (Top)    Pharmacy: [address] #: [phone]         [facility] On Call     [facility] On Call Nurse Care Line -  Assistance  Registered nurses in the [facility] On Call Center provide clinical advisement, health education, appointment booking, and other advisory services.  Call for this free service at [phone].             Medications           Message regarding Medications     Verify the changes and/or additions to your medication regime listed below are the same as discussed with your clinician today.  If any of these changes or additions are incorrect, please notify your healthcare provider.        START taking these NEW medications        Refills    ammonium lactate 12 % Crea 11    Sig: Apply 1 application topically 2 (two) times daily.    Class: Normal    Route: Topical (Top)           Verify that the below list of medications is an accurate representation of the medications you are currently taking.  If none reported, the list may be blank. If incorrect, please contact your  "healthcare provider. Carry this list with you in case of emergency.           Current Medications     albuterol (PROAIR HFA) 90 mcg/actuation inhaler Inhale 2 puffs into the lungs every 6 (six) hours as needed.    albuterol-ipratropium 2.5mg-0.5mg/3mL (DUO-NEB) 0.5 mg-3 mg(2.5 mg base)/3 mL nebulizer solution Take 3 mLs by nebulization every 6 (six) hours as needed for Wheezing.    ammonium lactate 12 % Crea Apply 1 application topically 2 (two) times daily.    aspirin (ECOTRIN) 81 MG EC tablet Take 1 tablet (81 mg total) by mouth once daily.    atorvastatin (LIPITOR) 10 MG tablet Take 1 tablet (10 mg total) by mouth every evening.    budesonide-formoterol 80-4.5 mcg (SYMBICORT) 80-4.5 mcg/actuation HFAA Inhale 2 puffs into the lungs once daily.    clonazePAM (KLONOPIN) 0.5 MG tablet 0.25 mg po bid    docusate sodium (COLACE) 100 MG capsule Take 1 capsule (100 mg total) by mouth 2 (two) times daily.    fluticasone (FLONASE) 50 mcg/actuation nasal spray 2 sprays by Each Nare route once daily.    furosemide (LASIX) 20 MG tablet Take 1 tablet (20 mg total) by mouth once daily.    polyethylene glycol (GLYCOLAX) 17 gram/dose powder Take 17 g by mouth 2 (two) times daily as needed (Constipation).    potassium chloride (KLOR-CON) 20 mEq Pack Take 20 mEq by mouth once daily.    propranolol (INDERAL) 20 MG tablet Take 1 tablet (20 mg total) by mouth 2 (two) times daily. For tremors    triamcinolone acetonide 0.1% (KENALOG) 0.1 % ointment Apply topically 2 (two) times daily. To left elbow after cleansing with wound cleanser. Then apply non-stick telfa and kerlix guase until healed    trospium (SANCTURA) 20 mg Tab tablet Take 20 mg by mouth 2 (two) times daily.           Clinical Reference Information           Your Vitals Were     Height Weight Last Period BMI       5' 2" (1.575 m) 74.4 kg (164 lb 0.4 oz) (LMP Unknown) 30 kg/m2       Allergies as of 3/15/2017     Epinephrine    Pcn [Penicillins]      Immunizations " Administered on Date of Encounter - 3/15/2017     None      Orders Placed During Today's Visit      Normal Orders This Visit    ORTHOTIC DEVICE (DME)     Future Labs/Procedures Expected by Expires    X-Ray Foot Complete Right  3/15/2017 3/16/2018      Language Assistance Services     ATTENTION: Language assistance services are available, free of charge. Please call [phone].      ATENCIÓN: Si habla español, tiene a barrientos disposición servicios gratuitos de asistencia lingüística. Llame al [phone].     CHÚ Ý: N?u b?n nói Ti?ng Vi?t, có các d?ch v? h? tr? ngôn ng? mi?n phí dành cho b?n. G?i s? [phone].         [facility] complies with applicable Federal civil rights laws and does not discriminate on the basis of race, color, national origin, age, disability, or sex.

## 2017-03-20 RX ORDER — ALBUTEROL SULFATE 90 UG/1
2 AEROSOL, METERED RESPIRATORY (INHALATION) EVERY 6 HOURS PRN
Qty: 18 G | Refills: 5 | Status: SHIPPED | OUTPATIENT
Start: 2017-03-20 | End: 2017-07-18 | Stop reason: SDUPTHER

## 2017-03-20 NOTE — TELEPHONE ENCOUNTER
----- Message from Kary ALEXANDRA Brar sent at 3/20/2017  8:33 AM CDT -----  Contact: Patient  Jennifer, patient [phone], Calling to find out if the office has every done a chest xray and testing for Tuberculosis. Also, patient is requesting orders for insurance reimbursement for items already purchased, regular walker and toilet seat lift and bench slide for the shower.  And a list on all Rx's that she has been issued by Dr Mart, since start of care.   And refill on Rx Albuterol to [facility] on [address] at [phone]. Needs today by 1 pm, today.  Please advise on all. Thanks.

## 2017-03-20 NOTE — TELEPHONE ENCOUNTER
Wear did those orders for the DME get done?  The DME provider should've filled the insurance and giving you the rate with the insurance.  The insurance doesn't necessarily pay for the whole amount.    I will order the albuterol.    Notify the patient she had a normal chest x-ray with no sign of tuberculosis one month ago

## 2017-03-22 ENCOUNTER — PATIENT OUTREACH (OUTPATIENT)
Dept: ADMINISTRATIVE | Facility: CLINIC | Age: 82
End: 2017-03-22
Payer: MEDICARE

## 2017-03-27 ENCOUNTER — DOCUMENTATION ONLY (OUTPATIENT)
Dept: FAMILY MEDICINE | Facility: CLINIC | Age: 82
End: 2017-03-27

## 2017-03-27 ENCOUNTER — OFFICE VISIT (OUTPATIENT)
Dept: FAMILY MEDICINE | Facility: CLINIC | Age: 82
End: 2017-03-27
Payer: MEDICARE

## 2017-03-27 ENCOUNTER — OFFICE VISIT (OUTPATIENT)
Dept: PODIATRY | Facility: CLINIC | Age: 82
End: 2017-03-27
Payer: MEDICARE

## 2017-03-27 ENCOUNTER — TELEPHONE (OUTPATIENT)
Dept: PODIATRY | Facility: CLINIC | Age: 82
End: 2017-03-27

## 2017-03-27 VITALS
SYSTOLIC BLOOD PRESSURE: 98 MMHG | DIASTOLIC BLOOD PRESSURE: 65 MMHG | BODY MASS INDEX: 25.11 KG/M2 | WEIGHT: 136.44 LBS | HEIGHT: 62 IN | TEMPERATURE: 98 F | RESPIRATION RATE: 16 BRPM | OXYGEN SATURATION: 97 % | HEART RATE: 73 BPM

## 2017-03-27 VITALS — HEIGHT: 62 IN | WEIGHT: 136.44 LBS | BODY MASS INDEX: 25.11 KG/M2

## 2017-03-27 DIAGNOSIS — J20.9 ACUTE BRONCHITIS, UNSPECIFIED ORGANISM: ICD-10-CM

## 2017-03-27 DIAGNOSIS — K59.00 CONSTIPATION, UNSPECIFIED CONSTIPATION TYPE: ICD-10-CM

## 2017-03-27 DIAGNOSIS — M21.6X9 ACQUIRED EQUINUS DEFORMITY OF FOOT, UNSPECIFIED LATERALITY: ICD-10-CM

## 2017-03-27 DIAGNOSIS — Z87.898 HISTORY OF ULCERATION: Primary | ICD-10-CM

## 2017-03-27 DIAGNOSIS — R35.0 URINARY FREQUENCY: ICD-10-CM

## 2017-03-27 DIAGNOSIS — S46.011A ROTATOR CUFF STRAIN, RIGHT, INITIAL ENCOUNTER: Primary | ICD-10-CM

## 2017-03-27 DIAGNOSIS — Q66.50 PES VALGUS, UNSPECIFIED LATERALITY: ICD-10-CM

## 2017-03-27 PROCEDURE — 3074F SYST BP LT 130 MM HG: CPT | Mod: S$GLB,,, | Performed by: PODIATRIST

## 2017-03-27 PROCEDURE — 1157F ADVNC CARE PLAN IN RCRD: CPT | Mod: S$GLB,,, | Performed by: INTERNAL MEDICINE

## 2017-03-27 PROCEDURE — 3078F DIAST BP <80 MM HG: CPT | Mod: S$GLB,,, | Performed by: PODIATRIST

## 2017-03-27 PROCEDURE — 99214 OFFICE O/P EST MOD 30 MIN: CPT | Mod: 25,S$GLB,, | Performed by: INTERNAL MEDICINE

## 2017-03-27 PROCEDURE — 1157F ADVNC CARE PLAN IN RCRD: CPT | Mod: S$GLB,,, | Performed by: PODIATRIST

## 2017-03-27 PROCEDURE — 1160F RVW MEDS BY RX/DR IN RCRD: CPT | Mod: S$GLB,,, | Performed by: INTERNAL MEDICINE

## 2017-03-27 PROCEDURE — 3078F DIAST BP <80 MM HG: CPT | Mod: S$GLB,,, | Performed by: INTERNAL MEDICINE

## 2017-03-27 PROCEDURE — 99212 OFFICE O/P EST SF 10 MIN: CPT | Mod: S$GLB,,, | Performed by: PODIATRIST

## 2017-03-27 PROCEDURE — 3074F SYST BP LT 130 MM HG: CPT | Mod: S$GLB,,, | Performed by: INTERNAL MEDICINE

## 2017-03-27 PROCEDURE — 1125F AMNT PAIN NOTED PAIN PRSNT: CPT | Mod: S$GLB,,, | Performed by: INTERNAL MEDICINE

## 2017-03-27 PROCEDURE — 99999 PR PBB SHADOW E&M-EST. PATIENT-LVL II: CPT | Mod: PBBFAC,,, | Performed by: PODIATRIST

## 2017-03-27 PROCEDURE — 1159F MED LIST DOCD IN RCRD: CPT | Mod: S$GLB,,, | Performed by: PODIATRIST

## 2017-03-27 PROCEDURE — 20610 DRAIN/INJ JOINT/BURSA W/O US: CPT | Mod: S$GLB,,, | Performed by: INTERNAL MEDICINE

## 2017-03-27 PROCEDURE — 1126F AMNT PAIN NOTED NONE PRSNT: CPT | Mod: S$GLB,,, | Performed by: PODIATRIST

## 2017-03-27 PROCEDURE — 1160F RVW MEDS BY RX/DR IN RCRD: CPT | Mod: S$GLB,,, | Performed by: PODIATRIST

## 2017-03-27 PROCEDURE — 1159F MED LIST DOCD IN RCRD: CPT | Mod: S$GLB,,, | Performed by: INTERNAL MEDICINE

## 2017-03-27 RX ORDER — TRIAMCINOLONE ACETONIDE 40 MG/ML
40 INJECTION, SUSPENSION INTRA-ARTICULAR; INTRAMUSCULAR
Status: DISCONTINUED | OUTPATIENT
Start: 2017-03-27 | End: 2017-03-27 | Stop reason: HOSPADM

## 2017-03-27 RX ADMIN — TRIAMCINOLONE ACETONIDE 40 MG: 40 INJECTION, SUSPENSION INTRA-ARTICULAR; INTRAMUSCULAR at 01:03
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
## 2017-03-27 NOTE — PROGRESS NOTES
Subjective:      Patient ID: Jennifer Diaz is a 84 y.o. female.    Chief Complaint: Foot Ulcer (left)    Pain and closed ulcer bottom of arch left foot.  Cushioning with border gauze and wearing soft slippers.  Denies open wound and trauma/signs infection.  Negative acute injury on xray.    Review of Systems   Constitution: Negative for chills, diaphoresis, fever, malaise/fatigue and night sweats.   Cardiovascular: Negative for claudication, cyanosis, leg swelling and syncope.   Skin: Positive for poor wound healing and suspicious lesions. Negative for color change, dry skin, nail changes, rash and unusual hair distribution.   Musculoskeletal: Positive for arthritis. Negative for falls, joint pain, joint swelling, muscle cramps, muscle weakness and stiffness.   Gastrointestinal: Negative for constipation, diarrhea, nausea and vomiting.   Neurological: Negative for brief paralysis, disturbances in coordination, focal weakness, numbness, paresthesias, sensory change and tremors.           Objective:      Physical Exam   Constitutional: She appears well-developed and well-nourished. She is cooperative. No distress.   Cardiovascular:   Pulses:       Popliteal pulses are 2+ on the right side, and 2+ on the left side.        Dorsalis pedis pulses are 1+ on the right side, and 1+ on the left side.        Posterior tibial pulses are 1+ on the right side, and 1+ on the left side.   Capillary refill 3 seconds all toes/distal feet, all toes/both feet warm to touch.      Negative lymphadenopathy bilateral popliteal fossa and tarsal tunnel.      Negavie lower extremity edema bilateral.     Musculoskeletal:        Right ankle: Normal. She exhibits normal range of motion, no swelling, no ecchymosis, no deformity, no laceration and normal pulse. Achilles tendon normal. Achilles tendon exhibits no pain, no defect and normal Kelley's test results.   Rigid collapsed flat foot left with prominent plantar bone without  rockerbottom left.    Ankle dorsiflexion decreased at <10 degrees bilateral with moderate increase with knee flexion bilateral.      Otherwise, Normal angle, base, station of gait. All ten toes without clubbing, cyanosis, or signs of ischemia.  No pain to palpation bilateral lower extremities.  Range of motion, stability, muscle strength, and muscle tone normal bilateral feet and legs.     Lymphadenopathy:   Negative lymphadenopathy bilateral popliteal fossa and tarsal tunnel.   Neurological: She is alert. She has normal strength. She displays no atrophy and no tremor. No sensory deficit. She exhibits normal muscle tone. She displays no seizure activity. Gait normal.   Reflex Scores:       Patellar reflexes are 2+ on the right side and 2+ on the left side.       Achilles reflexes are 2+ on the right side and 2+ on the left side.  Negative tinel sign to percussion sural, superficial peroneal, deep peroneal, saphenous, and posterior tibial nerves right and left ankles and feet.     Skin: Skin is warm, dry and intact. No abrasion, no bruising, no burn, no ecchymosis, no laceration, no lesion and no rash noted. She is not diaphoretic. No cyanosis or erythema. No pallor. Nails show no clubbing.   Skin is normal age and health appropriate color, turgor, texture, and temperature bilateral lower extremities without ulceration, hyperpigmentation, discoloration, masses nodules or cords palpated.  No ecchymosis, erythema, edema, or cardinal signs of infection bilateral lower extremities.               Assessment:       Encounter Diagnoses   Name Primary?    History of ulceration Yes    Pes valgus, unspecified laterality     Acquired equinus deformity of foot, unspecified laterality          Plan:       Jennifer was seen today for foot ulcer.    Diagnoses and all orders for this visit:    History of ulceration  -     ORTHOTIC DEVICE (DME)    Pes valgus, unspecified laterality  -     ORTHOTIC DEVICE (DME)    Acquired equinus  deformity of foot, unspecified laterality  -     ORTHOTIC DEVICE (DME)      I counseled the patient on her conditions, their implications and medical management.        Rx custom orthotics.    Inspect feet multiple times daily for signs of recurrence/occurrence ulceration/infection.           Return if symptoms worsen or fail to improve.

## 2017-03-27 NOTE — PROCEDURES
Large Joint Aspiration/Injection  Date/Time: 3/27/2017 1:29 PM  Performed by: ARTUR NEW  Authorized by: ARTUR NEW     Consent Done?:  Yes (Written)  Indications:  Pain  Timeout: Prior to procedure the correct patient, procedure, and site was verified      Location:  Shoulder  Site:  R glenohumeral  Needle size:  25 G  Approach:  Lateral  Medications:  40 mg triamcinolone acetonide 40 mg/mL  Patient tolerance:  Patient tolerated the procedure well with no immediate complications

## 2017-03-27 NOTE — TELEPHONE ENCOUNTER
----- Message from Lola Leger sent at 3/23/2017  4:23 PM CDT -----  Contact: self  Patient wants tos peak with a nurse regarding getting a Rx for the foot cream, she was previously given in the hospital. Please call back at [phone] (home)

## 2017-03-27 NOTE — PROGRESS NOTES
Health Maintenance Due   Topic Date Due    Lipid Panel  09/07/1932    TETANUS VACCINE  09/07/1950    DEXA SCAN  09/07/1972    Zoster Vaccine  09/07/1992    Pneumococcal (65+) (2 of 2 - PPSV23) 03/15/2017

## 2017-03-27 NOTE — MR AVS SNAPSHOT
[facility]  [address] [address] [address]  Phone: [phone]  Fax: [phone]                  Jennifer Diaz   3/27/2017 11:20 AM   Office Visit    Description:  Female : 1932   Provider:  Catrachito Mart MD   Department:  [facility]           Reason for Visit     Hospital Follow Up     Cough     Constipation     Shoulder Pain           Diagnoses this Visit        Comments    Rotator cuff strain, right, initial encounter    -  Primary     Constipation, unspecified constipation type         Acute bronchitis, unspecified organism         Urinary frequency                To Do List           Future Appointments        Provider Department Dept Phone    3/27/2017 1:45 PM Vijay Chris DPM [facility] [phone]      Goals (5 Years of Data)     None      Follow-Up and Disposition     Return in about 6 weeks (around 2017).    Follow-up and Disposition History       These Medications        Disp Refills Start End    mirabegron (MYRBETRIQ) 25 mg Tb24 ER tablet 30 tablet 11 3/27/2017 3/27/2018    Take 1 tablet (25 mg total) by mouth once daily. - Oral    [facility] [address] [place] [address], [place] - [address] AT [facility] Ph #: [phone]         [facility] On Call     [facility] On Call Nurse Care Line -  Assistance  Registered nurses in the [facility] On Call Center provide clinical advisement, health education, appointment booking, and other advisory services.  Call for this free service at [phone].             Medications           Message regarding Medications     Verify the changes and/or additions to your medication regime listed below are the same as discussed with your clinician today.  If any of these changes or additions are incorrect, please notify your healthcare provider.        START taking these NEW medications        Refills    mirabegron (MYRBETRIQ) 25 mg Tb24 ER tablet 11     Sig: Take 1 tablet (25 mg total) by mouth once daily.    Class: Normal    Route: Oral      These medications were administered today        Dose Freq    triamcinolone acetonide injection 40 mg 40 mg     Sig: Inject 40 mg into the articular space.    Class: Normal    Route: Intra-articular      STOP taking these medications     trospium (SANCTURA) 20 mg Tab tablet Take 20 mg by mouth 2 (two) times daily.           Verify that the below list of medications is an accurate representation of the medications you are currently taking.  If none reported, the list may be blank. If incorrect, please contact your healthcare provider. Carry this list with you in case of emergency.           Current Medications     albuterol (PROAIR HFA) 90 mcg/actuation inhaler Inhale 2 puffs into the lungs every 6 (six) hours as needed.    albuterol-ipratropium 2.5mg-0.5mg/3mL (DUO-NEB) 0.5 mg-3 mg(2.5 mg base)/3 mL nebulizer solution Take 3 mLs by nebulization every 6 (six) hours as needed for Wheezing.    ammonium lactate 12 % Crea Apply 1 application topically 2 (two) times daily.    aspirin (ECOTRIN) 81 MG EC tablet Take 1 tablet (81 mg total) by mouth once daily.    atorvastatin (LIPITOR) 10 MG tablet Take 1 tablet (10 mg total) by mouth every evening.    budesonide-formoterol 80-4.5 mcg (SYMBICORT) 80-4.5 mcg/actuation HFAA Inhale 2 puffs into the lungs once daily.    clonazePAM (KLONOPIN) 0.5 MG tablet 0.25 mg po bid    fluticasone (FLONASE) 50 mcg/actuation nasal spray 2 sprays by Each Nare route once daily.    furosemide (LASIX) 20 MG tablet Take 1 tablet (20 mg total) by mouth once daily.    potassium chloride (KLOR-CON) 20 mEq Pack Take 20 mEq by mouth once daily.    propranolol (INDERAL) 20 MG tablet Take 1 tablet (20 mg total) by mouth 2 (two) times daily. For tremors    docusate sodium (COLACE) 100 MG capsule Take 1 capsule (100 mg total) by mouth 2 (two) times daily.    mirabegron (MYRBETRIQ) 25 mg Tb24 ER tablet Take 1  "tablet (25 mg total) by mouth once daily.    polyethylene glycol (GLYCOLAX) 17 gram/dose powder Take 17 g by mouth 2 (two) times daily as needed (Constipation).    triamcinolone acetonide 0.1% (KENALOG) 0.1 % ointment Apply topically 2 (two) times daily. To left elbow after cleansing with wound cleanser. Then apply non-stick telfa and kerlix guase until healed           Clinical Reference Information           Your Vitals Were     BP Pulse Temp Resp Height Weight    98/65 (BP Location: Right arm, Patient Position: Sitting, BP Method: Automatic) 73 97.8 °F (36.6 °C) (Oral) 16 5' 2" (1.575 m) 61.9 kg (136 lb 7.4 oz)    Last Period SpO2 BMI          (LMP Unknown) 97% 24.96 kg/m2        Blood Pressure          Most Recent Value    BP  98/65      Allergies as of 3/27/2017     Epinephrine    Pcn [Penicillins]      Immunizations Administered on Date of Encounter - 3/27/2017     None      Orders Placed During Today's Visit      Normal Orders This Visit    IN OFFICE EKG 12-LEAD (to Muse)     Large Joint Aspiration/Injection     POCT urine dipstick without microscope     Future Labs/Procedures Expected by Expires    Brain natriuretic peptide  3/27/2017 5/26/2018    CBC auto differential  3/27/2017 3/28/2018    D dimer, quantitative  3/27/2017 5/26/2018      Administrations This Visit     triamcinolone acetonide injection 40 mg     Admin Date Action Dose Route Administered By             03/27/2017 Given 40 mg Intra-articular Catrachito Mart MD                      Instructions    High-fiber cereal daily.  Suggest Post 55% fiber    MiraLAX daily.       Language Assistance Services     ATTENTION: Language assistance services are available, free of charge. Please call [phone].      ATENCIÓN: Si habla sourav, tiene a barrientos disposición servicios gratuitos de asistencia lingüística. Llame al [phone].     CHÚ Ý: N?u b?n nói Ti?ng Vi?t, có các d?ch v? h? tr? ngôn ng? mi?n phí dành cho b?n. G?i s? [phone].         " [facility] complies with applicable Federal civil rights laws and does not discriminate on the basis of race, color, national origin, age, disability, or sex.

## 2017-03-27 NOTE — MR AVS SNAPSHOT
[facility]  [address] [place] [address]  Phone: [phone]                  Jennifer Diaz   3/27/2017 1:45 PM   Office Visit    Description:  Female : 1932   Provider:  Vijay Chris DPM   Department:  [facility]           Reason for Visit     Foot Ulcer           Diagnoses this Visit        Comments    History of ulceration    -  Primary     Pes valgus, unspecified laterality         Acquired equinus deformity of foot, unspecified laterality                To Do List           Goals (5 Years of Data)     None      Follow-Up and Disposition     Return if symptoms worsen or fail to improve.    Follow-up and Disposition History      [facility]     [facility] -  Assistance  Registered nurses in the [facility] provide clinical advisement, health education, appointment booking, and other advisory services.  Call for this free service at [phone].             Medications           Message regarding Medications     Verify the changes and/or additions to your medication regime listed below are the same as discussed with your clinician today.  If any of these changes or additions are incorrect, please notify your healthcare provider.             Verify that the below list of medications is an accurate representation of the medications you are currently taking.  If none reported, the list may be blank. If incorrect, please contact your healthcare provider. Carry this list with you in case of emergency.           Current Medications     albuterol (PROAIR HFA) 90 mcg/actuation inhaler Inhale 2 puffs into the lungs every 6 (six) hours as needed.    albuterol-ipratropium 2.5mg-0.5mg/3mL (DUO-NEB) 0.5 mg-3 mg(2.5 mg base)/3 mL nebulizer solution Take 3 mLs by nebulization every 6 (six) hours as needed for Wheezing.    ammonium lactate 12 % Crea Apply 1 application topically 2 (two) times daily.    aspirin (ECOTRIN) 81 MG EC tablet  "Take 1 tablet (81 mg total) by mouth once daily.    atorvastatin (LIPITOR) 10 MG tablet Take 1 tablet (10 mg total) by mouth every evening.    budesonide-formoterol 80-4.5 mcg (SYMBICORT) 80-4.5 mcg/actuation HFAA Inhale 2 puffs into the lungs once daily.    clonazePAM (KLONOPIN) 0.5 MG tablet 0.25 mg po bid    fluticasone (FLONASE) 50 mcg/actuation nasal spray 2 sprays by Each Nare route once daily.    furosemide (LASIX) 20 MG tablet Take 1 tablet (20 mg total) by mouth once daily.    potassium chloride (KLOR-CON) 20 mEq Pack Take 20 mEq by mouth once daily.    propranolol (INDERAL) 20 MG tablet Take 1 tablet (20 mg total) by mouth 2 (two) times daily. For tremors    docusate sodium (COLACE) 100 MG capsule Take 1 capsule (100 mg total) by mouth 2 (two) times daily.    mirabegron (MYRBETRIQ) 25 mg Tb24 ER tablet Take 1 tablet (25 mg total) by mouth once daily.    polyethylene glycol (GLYCOLAX) 17 gram/dose powder Take 17 g by mouth 2 (two) times daily as needed (Constipation).    triamcinolone acetonide 0.1% (KENALOG) 0.1 % ointment Apply topically 2 (two) times daily. To left elbow after cleansing with wound cleanser. Then apply non-stick telfa and kerlix guase until healed           Clinical Reference Information           Your Vitals Were     Height Weight Last Period BMI       5' 2" (1.575 m) 61.9 kg (136 lb 7.4 oz) (LMP Unknown) 24.96 kg/m2       Allergies as of 3/27/2017     Epinephrine    Pcn [Penicillins]      Immunizations Administered on Date of Encounter - 3/27/2017     None      Orders Placed During Today's Visit      Normal Orders This Visit    ORTHOTIC DEVICE (DME)       Language Assistance Services     ATTENTION: Language assistance services are available, free of charge. Please call [phone].      ATENCIÓN: Si habla salimabibi, tiene a barrientos disposición servicios gratuitos de asistencia lingüística. Llame al [phone].     CHÚ Ý: N?u b?n nói Ti?ng Vi?t, có các d?ch v? h? tr? ngôn ng? mi?n phí " dành cho b?n. G?i s? [phone].         [facility] complies with applicable Federal civil rights laws and does not discriminate on the basis of race, color, national origin, age, disability, or sex.

## 2017-03-29 DIAGNOSIS — J45.20 MILD INTERMITTENT INTRINSIC ASTHMA WITHOUT COMPLICATION: ICD-10-CM

## 2017-03-29 DIAGNOSIS — G25.2 INTENTION TREMOR: ICD-10-CM

## 2017-03-29 DIAGNOSIS — R60.0 BILATERAL EDEMA OF LOWER EXTREMITY: ICD-10-CM

## 2017-03-29 RX ORDER — ATORVASTATIN CALCIUM 10 MG/1
10 TABLET, FILM COATED ORAL NIGHTLY
Qty: 30 TABLET | Refills: 0 | Status: SHIPPED | OUTPATIENT
Start: 2017-03-29 | End: 2017-04-24 | Stop reason: SDUPTHER

## 2017-03-29 RX ORDER — FUROSEMIDE 20 MG/1
20 TABLET ORAL DAILY
Qty: 30 TABLET | Refills: 11 | Status: SHIPPED | OUTPATIENT
Start: 2017-03-29 | End: 2017-05-25 | Stop reason: SDUPTHER

## 2017-03-29 RX ORDER — ATORVASTATIN CALCIUM 10 MG/1
TABLET, FILM COATED ORAL
Qty: 30 TABLET | Refills: 0 | OUTPATIENT
Start: 2017-03-29

## 2017-03-29 RX ORDER — PROPRANOLOL HYDROCHLORIDE 20 MG/1
20 TABLET ORAL 2 TIMES DAILY
Qty: 180 TABLET | Refills: 1 | Status: SHIPPED | OUTPATIENT
Start: 2017-03-29 | End: 2017-07-21 | Stop reason: SDUPTHER

## 2017-03-29 NOTE — TELEPHONE ENCOUNTER
I'm not going to write for Klonopin.  In the chart was written on 3/1/2017 with enough for 3 months.

## 2017-03-29 NOTE — TELEPHONE ENCOUNTER
Pt needs refills on the listed med and also:    clonazepam or switch over to the antidepressant.      She also concern about a medication that's causing dizziness.      Please advise.

## 2017-03-29 NOTE — TELEPHONE ENCOUNTER
----- Message from Madyson Herzog sent at 3/29/2017 12:03 PM CDT -----  Contact: Liane Simons (Daughter) [phone]  Patient need a refill on furosemide, atorvastatin, propranolol, clonazepam or switch over to the antidepressant.      She also concern about a medication that's causing dizziness.      Patient will be using   [facility] [address] [place] [address] [place] - [address] AT [place] [address] [place] [address]  Phone: [phone] Fax: [phone]    Please call Liane Simons (Daughter) [phone].

## 2017-04-02 ENCOUNTER — PATIENT MESSAGE (OUTPATIENT)
Dept: FAMILY MEDICINE | Facility: CLINIC | Age: 82
End: 2017-04-02

## 2017-04-24 RX ORDER — ATORVASTATIN CALCIUM 10 MG/1
10 TABLET, FILM COATED ORAL NIGHTLY
Qty: 30 TABLET | Refills: 0 | Status: SHIPPED | OUTPATIENT
Start: 2017-04-24 | End: 2017-06-09 | Stop reason: SDUPTHER

## 2017-05-08 DIAGNOSIS — J45.20 MILD INTERMITTENT INTRINSIC ASTHMA WITHOUT COMPLICATION: ICD-10-CM

## 2017-05-08 RX ORDER — CLONAZEPAM 0.5 MG/1
TABLET ORAL
Qty: 180 TABLET | Refills: 0 | OUTPATIENT
Start: 2017-05-08

## 2017-05-08 NOTE — TELEPHONE ENCOUNTER
----- Message from Madyson Herzog sent at 5/8/2017  3:19 PM CDT -----  Contact: self [phone]  Patient requesting a refill on clonazepam.    Patient will be using   [facility] [id number]  [place] - [address] AT [place] [address] [place] [address]  Phone: [phone] Fax: [phone]    Please call patient at [phone]. Thanks!

## 2017-05-25 ENCOUNTER — TELEPHONE (OUTPATIENT)
Dept: FAMILY MEDICINE | Facility: CLINIC | Age: 82
End: 2017-05-25

## 2017-05-25 DIAGNOSIS — R60.0 BILATERAL EDEMA OF LOWER EXTREMITY: ICD-10-CM

## 2017-05-25 RX ORDER — FUROSEMIDE 20 MG/1
20 TABLET ORAL DAILY
Qty: 30 TABLET | Refills: 11 | Status: SHIPPED | OUTPATIENT
Start: 2017-05-25 | End: 2017-06-24

## 2017-05-25 NOTE — TELEPHONE ENCOUNTER
----- Message from Lydia Haji sent at 5/24/2017  1:15 PM CDT -----  Contact: self  Patient would like to speak to a nurse regarding her prescription  She would not give the information     Please call her at     Thanks

## 2017-05-25 NOTE — TELEPHONE ENCOUNTER
----- Message from Thaddeus Chávez sent at 5/24/2017  3:59 PM CDT -----  Contact: same  Spoke to nurse.  Patient called in and stated she was returning a call to nurse .  Patient call back number is [phone]

## 2017-05-25 NOTE — TELEPHONE ENCOUNTER
----- Message from Madyson Herzog sent at 5/24/2017 12:06 PM CDT -----  Contact: [phone]  Patient is requesting a call back from the nurse.  Please call the patient upon request at phone number [phone].

## 2017-05-25 NOTE — TELEPHONE ENCOUNTER
----- Message from Shikha Russ sent at 5/25/2017  8:23 AM CDT -----  Contact: patient  Patient returning a missed call. Please advise. Call to pod. Call connected to pod. Warm transferred.  Thanks!

## 2017-05-26 RX ORDER — POLYETHYLENE GLYCOL 3350 17 G/17G
17 POWDER, FOR SOLUTION ORAL 2 TIMES DAILY PRN
Qty: 289 G | Refills: 0 | Status: SHIPPED | OUTPATIENT
Start: 2017-05-26 | End: 2017-05-29 | Stop reason: SDUPTHER

## 2017-05-26 NOTE — TELEPHONE ENCOUNTER
----- Message from Dana Chung sent at 5/26/2017  1:42 PM CDT -----  Please call patient in regards to a change of medication, [phone]

## 2017-05-26 NOTE — TELEPHONE ENCOUNTER
----- Message from Johanna Benjamin sent at 5/26/2017 10:26 AM CDT -----  Contact: self- [phone]  Patient called asking to speak with the nurse. Thanks!

## 2017-05-29 ENCOUNTER — TELEPHONE (OUTPATIENT)
Dept: FAMILY MEDICINE | Facility: CLINIC | Age: 82
End: 2017-05-29

## 2017-05-29 RX ORDER — POLYETHYLENE GLYCOL 3350 17 G/17G
17 POWDER, FOR SOLUTION ORAL 2 TIMES DAILY PRN
Qty: 289 G | Refills: 0 | Status: SHIPPED | OUTPATIENT
Start: 2017-05-29 | End: 2018-05-29

## 2017-05-29 NOTE — TELEPHONE ENCOUNTER
----- Message from Araceli Guerra sent at 5/29/2017 12:28 PM CDT -----  Contact: morris  Patient would like a call back regarding spactoneirolol. Patient needs to know if it was sent to [facility]'s pharmacy. Please call patient at [phone]. Thanks!  [facility] [address] [place] [address] [place] - [address] AT [place] [address] [place] [address]  Phone: [phone] Fax: [phone]

## 2017-05-29 NOTE — TELEPHONE ENCOUNTER
----- Message from Araceli Guerra sent at 5/26/2017  3:40 PM CDT -----  Contact: self  Patient called asking for advice about changing a medication potassium fluoride powder;Furosemide. Patient would like to try spirololactone.   Please call patient at [phone]. Thanks!    [facility] [address] [place] [address] [place] - [address] AT [place] [address] [place] [address]  Phone: [phone] Fax: [phone]

## 2017-05-31 ENCOUNTER — TELEPHONE (OUTPATIENT)
Dept: FAMILY MEDICINE | Facility: CLINIC | Age: 82
End: 2017-05-31

## 2017-05-31 NOTE — TELEPHONE ENCOUNTER
----- Message from Lydia Haji sent at 5/30/2017  3:54 PM CDT -----  Contact: self  Patient requesting refill on Potassium 20 mg called to [facility] in [place].    If any questions please call      Thanks

## 2017-05-31 NOTE — TELEPHONE ENCOUNTER
Pt called back.  Explained to pt that she should have refills at [facility], to contact them.  If they don't have refills, please call us back and we will send refill to pharmacy.  Voiced understanding.

## 2017-06-09 RX ORDER — ATORVASTATIN CALCIUM 10 MG/1
10 TABLET, FILM COATED ORAL NIGHTLY
Qty: 30 TABLET | Refills: 0 | Status: SHIPPED | OUTPATIENT
Start: 2017-06-09 | End: 2018-02-20

## 2017-06-19 ENCOUNTER — TELEPHONE (OUTPATIENT)
Dept: FAMILY MEDICINE | Facility: CLINIC | Age: 82
End: 2017-06-19

## 2017-06-19 DIAGNOSIS — N39.0 URINARY TRACT INFECTION WITHOUT HEMATURIA, SITE UNSPECIFIED: Primary | ICD-10-CM

## 2017-06-19 RX ORDER — SULFAMETHOXAZOLE AND TRIMETHOPRIM 200; 40 MG/5ML; MG/5ML
20 SUSPENSION ORAL EVERY 12 HOURS
Qty: 400 ML | Refills: 0 | Status: SHIPPED | OUTPATIENT
Start: 2017-06-19 | End: 2017-07-18 | Stop reason: SDUPTHER

## 2017-06-19 NOTE — TELEPHONE ENCOUNTER
Spoke with patient.  She will call back with fax number for [facility].  Requesting liquid for Bactrim. States she can't swallow pill even if cut in half.  Fwd to Dr Mart

## 2017-06-19 NOTE — TELEPHONE ENCOUNTER
----- Message from Noris Martino sent at 6/16/2017  3:40 PM CDT -----  Contact: Jennifer Orozco is requesting immunization and medication records to be faxed to [facility] (did not have fax number). Please call when completed [phone]. Thanks!

## 2017-06-19 NOTE — TELEPHONE ENCOUNTER
----- Message from Alexa Martino sent at 6/19/2017 10:08 AM CDT -----  Contact: Patient  Patient needs clarification on her medicine. Please call her at [phone].

## 2017-06-19 NOTE — TELEPHONE ENCOUNTER
----- Message from Johanna Benjamin sent at 6/19/2017 10:03 AM CDT -----  Contact: self  Patient called asking for a different rx to replace rx sulfaneth  tablet is too big. Patient asking for rx in liquid form.Thanks!

## 2017-06-27 RX ORDER — IPRATROPIUM BROMIDE AND ALBUTEROL SULFATE 2.5; .5 MG/3ML; MG/3ML
3 SOLUTION RESPIRATORY (INHALATION) EVERY 6 HOURS PRN
Qty: 90 VIAL | Refills: 5 | Status: SHIPPED | OUTPATIENT
Start: 2017-06-27 | End: 2017-06-27 | Stop reason: SDUPTHER

## 2017-06-28 RX ORDER — IPRATROPIUM BROMIDE AND ALBUTEROL SULFATE 2.5; .5 MG/3ML; MG/3ML
SOLUTION RESPIRATORY (INHALATION)
Qty: 3420 ML | Refills: 5 | Status: SHIPPED | OUTPATIENT
Start: 2017-06-28 | End: 2018-05-25 | Stop reason: SDUPTHER

## 2017-07-18 ENCOUNTER — DOCUMENTATION ONLY (OUTPATIENT)
Dept: FAMILY MEDICINE | Facility: CLINIC | Age: 82
End: 2017-07-18

## 2017-07-18 ENCOUNTER — OFFICE VISIT (OUTPATIENT)
Dept: FAMILY MEDICINE | Facility: CLINIC | Age: 82
End: 2017-07-18
Payer: MEDICARE

## 2017-07-18 VITALS
SYSTOLIC BLOOD PRESSURE: 99 MMHG | HEART RATE: 68 BPM | BODY MASS INDEX: 21.75 KG/M2 | HEIGHT: 62 IN | TEMPERATURE: 99 F | RESPIRATION RATE: 22 BRPM | OXYGEN SATURATION: 93 % | DIASTOLIC BLOOD PRESSURE: 54 MMHG | WEIGHT: 118.19 LBS

## 2017-07-18 DIAGNOSIS — J45.20 MILD INTERMITTENT INTRINSIC ASTHMA WITHOUT COMPLICATION: ICD-10-CM

## 2017-07-18 PROCEDURE — 81002 URINALYSIS NONAUTO W/O SCOPE: CPT | Mod: S$GLB,,, | Performed by: INTERNAL MEDICINE

## 2017-07-18 PROCEDURE — 99214 OFFICE O/P EST MOD 30 MIN: CPT | Mod: 25,S$GLB,, | Performed by: INTERNAL MEDICINE

## 2017-07-18 PROCEDURE — 99499 UNLISTED E&M SERVICE: CPT | Mod: S$GLB,,, | Performed by: INTERNAL MEDICINE

## 2017-07-18 PROCEDURE — 93010 ELECTROCARDIOGRAM REPORT: CPT | Mod: ,,, | Performed by: INTERNAL MEDICINE

## 2017-07-18 PROCEDURE — 93005 ELECTROCARDIOGRAM TRACING: CPT | Mod: S$GLB,,, | Performed by: INTERNAL MEDICINE

## 2017-07-18 PROCEDURE — 1126F AMNT PAIN NOTED NONE PRSNT: CPT | Mod: S$GLB,,, | Performed by: INTERNAL MEDICINE

## 2017-07-18 PROCEDURE — 1159F MED LIST DOCD IN RCRD: CPT | Mod: S$GLB,,, | Performed by: INTERNAL MEDICINE

## 2017-07-18 RX ORDER — ALBUTEROL SULFATE 90 UG/1
2 AEROSOL, METERED RESPIRATORY (INHALATION) EVERY 6 HOURS PRN
Qty: 18 G | Refills: 5 | Status: SHIPPED | OUTPATIENT
Start: 2017-07-18 | End: 2017-07-21 | Stop reason: SDUPTHER

## 2017-07-18 RX ORDER — MIRTAZAPINE 15 MG/1
TABLET, FILM COATED ORAL
Qty: 90 TABLET | Refills: 11 | Status: SHIPPED | OUTPATIENT
Start: 2017-07-18 | End: 2018-05-29

## 2017-07-18 RX ORDER — FUROSEMIDE 20 MG/1
TABLET ORAL
Refills: 11 | COMMUNITY
Start: 2017-07-15 | End: 2017-07-19 | Stop reason: SDUPTHER

## 2017-07-18 RX ORDER — MIRTAZAPINE 15 MG/1
15 TABLET, FILM COATED ORAL NIGHTLY
Qty: 30 TABLET | Refills: 11 | Status: SHIPPED | OUTPATIENT
Start: 2017-07-18 | End: 2017-07-18 | Stop reason: SDUPTHER

## 2017-07-18 RX ORDER — POTASSIUM CHLORIDE
POWDER (GRAM) MISCELLANEOUS
Refills: 10 | COMMUNITY
Start: 2017-06-01 | End: 2017-07-18 | Stop reason: SDUPTHER

## 2017-07-18 RX ORDER — SULFAMETHOXAZOLE AND TRIMETHOPRIM 800; 160 MG/1; MG/1
TABLET ORAL
Refills: 0 | COMMUNITY
Start: 2017-06-12 | End: 2017-08-25 | Stop reason: HOSPADM

## 2017-07-18 NOTE — PATIENT INSTRUCTIONS

## 2017-07-18 NOTE — TELEPHONE ENCOUNTER
----- Message from Nevillecarol Rubi sent at 7/18/2017  3:03 PM CDT -----  Contact: SELF  PT IS RE[facility]ING [facility] [address]    PLEASE CALL PT TO ADVISE WHEN DONE.    THANK YOU

## 2017-07-18 NOTE — PROGRESS NOTES
Subjective:       Patient ID: Jennifer Diaz is a 84 y.o. female.    Chief Complaint: Edema (ankle bilateral)    HPI       CHIEF COMPLAINT: weight loss  HPI:     ONSET/TIMING: Onset    4 mo       ago.  Gradual.  Similar problems in past: no .     DURATION:       QUALITY/COURSE:  worse    LOCATION:     INTENSITY/SEVERITY: 20 lbs. .Severity is #   5    (10 point scale).      CONTEXT/WHEN: .decreased appetite: yes.  . Alcohol abuse: no .    MODIFIERS/TREATMENTS: .      SYMPTOMS/RELATED:  Possible medication side effects include:    .    The following symptoms are positive if BOLD, negative otherwise.       REVIEW OF SYMPTOMS: . Anorexia.  Stress. Night sweats .Lymphadenopathy.  . Tick_Bites.   . Loss_of_concentration. Loss_of_intersts  Wt Readings from Last 1 Encounters:   07/18/17 1336 53.6 kg (118 lb 2.7 oz)       Lab Results   Component Value Date    WBC 6.30 02/23/2017    HGB 12.4 02/23/2017    HCT 39.1 02/23/2017     02/23/2017    ALT 22 02/23/2017    AST 41 (H) 02/23/2017     02/23/2017    K 3.5 02/23/2017    CL 94 (L) 02/23/2017    CREATININE 0.7 02/23/2017    BUN 21 02/23/2017    CO2 32 (H) 02/23/2017    TSH 2.133 02/23/2017    INR 1.1 02/06/2017    HGBA1C 5.8 02/07/2017         Review of Systems   Constitutional: Positive for appetite change. Negative for fatigue and fever.   HENT: Negative for dental problem, sore throat and trouble swallowing.    Respiratory: Positive for shortness of breath. Negative for cough.    Cardiovascular: Positive for leg swelling. Negative for chest pain.   Gastrointestinal: Positive for constipation. Negative for abdominal pain, blood in stool, diarrhea, nausea and rectal pain.   Endocrine: Positive for cold intolerance. Negative for heat intolerance, polydipsia and polyuria.   Genitourinary: Negative for difficulty urinating, dysuria, flank pain and frequency.   Musculoskeletal: Positive for joint swelling (right shoulder. ). Negative for arthralgias and  "back pain.   Neurological: Negative for dizziness and headaches.   Psychiatric/Behavioral: Negative for dysphoric mood and sleep disturbance.       Objective:      Vitals:    07/18/17 1336   BP: (!) 99/54   Pulse: 68   Resp: (!) 22   Temp: 98.7 °F (37.1 °C)   TempSrc: Oral   SpO2: (!) 93%   Weight: 53.6 kg (118 lb 2.7 oz)   Height: 5' 2" (1.575 m)   PainSc: 0-No pain     Physical Exam   Constitutional: She appears well-developed and well-nourished.   Eyes: Pupils are equal, round, and reactive to light.   Cardiovascular: Normal rate, regular rhythm and normal heart sounds.    Pulmonary/Chest: Effort normal and breath sounds normal.   Abdominal: Soft. There is no tenderness.   Musculoskeletal: She exhibits edema (3+ bilaterally).   Neurological: She is alert.   Psychiatric: She has a normal mood and affect. Her behavior is normal. Thought content normal.   Nursing note and vitals reviewed.   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  -     Brain natriuretic peptide; Future; Expected date: 07/18/2017  -     Comprehensive metabolic panel; Future; Expected date: 07/18/2017  -     CBC auto differential; Future; Expected date: 07/18/2017  -     TSH; Future; Expected date: 07/18/2017  -     POCT urine dipstick without microscope  -     EKG 12-lead  -     X-Ray Chest PA And Lateral; Future; Expected date: 07/18/2017  -     US Abdomen Complete; Future; Expected date: 07/18/2017  -     Ambulatory Referral to Gastroenterology  -     Mammo Digital Screening Bilat with CAD; Future; Expected date: 07/18/2017  -     mirtazapine (REMERON) 15 MG tablet; Take 1 tablet (15 mg total) by mouth every evening.  Dispense: 30 tablet; Refill: 11      Return in about 6 weeks (around 8/29/2017).      "

## 2017-07-19 LAB
BILIRUB SERPL-MCNC: NORMAL MG/DL
BLOOD URINE, POC: NORMAL
COLOR, POC UA: YELLOW
GLUCOSE UR QL STRIP: NORMAL
KETONES UR QL STRIP: NORMAL
LEUKOCYTE ESTERASE URINE, POC: NORMAL
NITRITE, POC UA: NORMAL
PH, POC UA: 5
PROTEIN, POC: NORMAL
SPECIFIC GRAVITY, POC UA: 1.02
UROBILINOGEN, POC UA: NORMAL

## 2017-07-19 RX ORDER — FUROSEMIDE 20 MG/1
20 TABLET ORAL DAILY
Qty: 30 TABLET | Refills: 11 | Status: SHIPPED | OUTPATIENT
Start: 2017-07-19 | End: 2018-05-29

## 2017-07-21 ENCOUNTER — TELEPHONE (OUTPATIENT)
Dept: FAMILY MEDICINE | Facility: CLINIC | Age: 82
End: 2017-07-21

## 2017-07-21 DIAGNOSIS — G25.2 INTENTION TREMOR: ICD-10-CM

## 2017-07-21 RX ORDER — ALBUTEROL SULFATE 90 UG/1
2 AEROSOL, METERED RESPIRATORY (INHALATION) EVERY 6 HOURS PRN
Qty: 18 G | Refills: 5 | Status: SHIPPED | OUTPATIENT
Start: 2017-07-21 | End: 2018-06-13 | Stop reason: SDUPTHER

## 2017-07-21 RX ORDER — PROPRANOLOL HYDROCHLORIDE 20 MG/1
20 TABLET ORAL 2 TIMES DAILY
Qty: 180 TABLET | Refills: 1 | Status: SHIPPED | OUTPATIENT
Start: 2017-07-21 | End: 2018-02-02 | Stop reason: SDUPTHER

## 2017-07-21 NOTE — TELEPHONE ENCOUNTER
----- Message from Jess Zelaya sent at 7/20/2017  3:40 PM CDT -----  Patient requesting to speak with nurse concerning referral to another doctor/has question/please call back at [phone] to advise.

## 2017-07-21 NOTE — TELEPHONE ENCOUNTER
----- Message from Eliezer Grewal sent at 7/20/2017  5:03 PM CDT -----  Contact: pt   Pt would like to be called back regarding speaking with nurse.       Pt can be reached at [phone]

## 2017-07-28 ENCOUNTER — TELEPHONE (OUTPATIENT)
Dept: FAMILY MEDICINE | Facility: CLINIC | Age: 82
End: 2017-07-28

## 2017-07-28 DIAGNOSIS — J44.9 CHRONIC OBSTRUCTIVE PULMONARY DISEASE, UNSPECIFIED COPD TYPE: Primary | ICD-10-CM

## 2017-07-28 RX ORDER — ALBUTEROL SULFATE 90 UG/1
2 AEROSOL, METERED RESPIRATORY (INHALATION) EVERY 6 HOURS PRN
Qty: 1 EACH | Refills: 11 | Status: SHIPPED | OUTPATIENT
Start: 2017-07-28 | End: 2018-04-30 | Stop reason: SDUPTHER

## 2017-07-28 NOTE — TELEPHONE ENCOUNTER
----- Message from Jess Chavez sent at 7/28/2017  9:20 AM CDT -----  [facility] pharmacy called regarding ProAir for the above patient. There are requesting RX Substitution to [facility] insurance will not cover ProAir, contact pharmacy at [phone] Thanks.        [facility] [id number]  [place] - [address] AT [place] [address] [place] [address]  Phone: [phone] Fax: [phone]

## 2017-08-11 DIAGNOSIS — J45.20 MILD INTERMITTENT INTRINSIC ASTHMA WITHOUT COMPLICATION: ICD-10-CM

## 2017-08-11 NOTE — TELEPHONE ENCOUNTER
----- Message from Meri Hernandez sent at 8/11/2017  9:33 AM CDT -----  needs callback rg med problem, will elaborate..[phone] (home)

## 2017-08-11 NOTE — TELEPHONE ENCOUNTER
Spoke with patient.  Complaint of constipation.  Taking murlax and not helping.  Recommended increasing fluids if appropriate for her and she is not on restriction.  Instructed to call back Monday if not better.

## 2017-08-13 RX ORDER — BUDESONIDE AND FORMOTEROL FUMARATE DIHYDRATE 80; 4.5 UG/1; UG/1
AEROSOL RESPIRATORY (INHALATION)
Qty: 10.2 G | Refills: 0 | Status: SHIPPED | OUTPATIENT
Start: 2017-08-13 | End: 2018-07-02 | Stop reason: SDUPTHER

## 2017-08-24 ENCOUNTER — TELEPHONE (OUTPATIENT)
Dept: FAMILY MEDICINE | Facility: CLINIC | Age: 82
End: 2017-08-24

## 2017-08-24 NOTE — TELEPHONE ENCOUNTER
----- Message from Lydia Haji sent at 8/24/2017 11:50 AM CDT -----  Contact: self  Patient would like to speak to a nurse regarding a medication reaction she is having    Please call  to advise.     Thanks

## 2017-08-24 NOTE — TELEPHONE ENCOUNTER
----- Message from Madyson Herzog sent at 8/24/2017  4:16 PM CDT -----  Contact: [phone]  Patient is returning nurse's phone call.  Please call patient back at [phone].

## 2017-08-24 NOTE — TELEPHONE ENCOUNTER
Spoke with patient.  [place] like she was going to pass out yesterday.  Pharmacy suggested it might be her lasix.  She is taking her potassium also.  Better today.  Fwd to provider to advise.

## 2017-08-24 NOTE — TELEPHONE ENCOUNTER
Patient was advised of message and she stated that she will try to get a ride to here in the morning. Patient is going to call first thing in morning to get an appointment.

## 2017-08-24 NOTE — TELEPHONE ENCOUNTER
Patient needs to be seen, she had a very low b/p last visit, could be dehydrated, have her come in here tomorrow or go to ER.

## 2017-08-25 ENCOUNTER — OFFICE VISIT (OUTPATIENT)
Dept: FAMILY MEDICINE | Facility: CLINIC | Age: 82
End: 2017-08-25
Payer: MEDICARE

## 2017-08-25 ENCOUNTER — DOCUMENTATION ONLY (OUTPATIENT)
Dept: FAMILY MEDICINE | Facility: CLINIC | Age: 82
End: 2017-08-25

## 2017-08-25 VITALS
OXYGEN SATURATION: 97 % | BODY MASS INDEX: 22.28 KG/M2 | TEMPERATURE: 98 F | SYSTOLIC BLOOD PRESSURE: 93 MMHG | DIASTOLIC BLOOD PRESSURE: 57 MMHG | WEIGHT: 121.06 LBS | HEIGHT: 62 IN | HEART RATE: 60 BPM

## 2017-08-25 DIAGNOSIS — R63.4 WEIGHT LOSS, ABNORMAL: Primary | ICD-10-CM

## 2017-08-25 DIAGNOSIS — R60.0 BILATERAL LEG EDEMA: ICD-10-CM

## 2017-08-25 PROCEDURE — 3008F BODY MASS INDEX DOCD: CPT | Mod: S$GLB,,, | Performed by: NURSE PRACTITIONER

## 2017-08-25 PROCEDURE — 3074F SYST BP LT 130 MM HG: CPT | Mod: S$GLB,,, | Performed by: NURSE PRACTITIONER

## 2017-08-25 PROCEDURE — 1126F AMNT PAIN NOTED NONE PRSNT: CPT | Mod: S$GLB,,, | Performed by: NURSE PRACTITIONER

## 2017-08-25 PROCEDURE — 3078F DIAST BP <80 MM HG: CPT | Mod: S$GLB,,, | Performed by: NURSE PRACTITIONER

## 2017-08-25 PROCEDURE — 1159F MED LIST DOCD IN RCRD: CPT | Mod: S$GLB,,, | Performed by: NURSE PRACTITIONER

## 2017-08-25 PROCEDURE — 99213 OFFICE O/P EST LOW 20 MIN: CPT | Mod: S$GLB,,, | Performed by: NURSE PRACTITIONER

## 2017-08-25 NOTE — PROGRESS NOTES
Subjective:       Patient ID: Jennifer Diaz is a 84 y.o. female.    Chief Complaint: Follow-up  Follow up for weight loss, had multiple tests ordered, including labs and x-rays. Did not get any of the testing done. States she is not trying to lose weight, but continues to have weight loss. Refuses to have a mammogram done. States she will do the other tests at a later date.     C/O leg edema, but stopped taking lasix due to feeling weak a couple of days ago. Despite this, her blood pressure is low.   HPI  Review of Systems   Constitutional: Positive for unexpected weight change. Negative for appetite change and fever.   Cardiovascular: Positive for leg swelling. Negative for chest pain.       Objective:      Physical Exam   Constitutional: She is oriented to person, place, and time. She appears well-developed and well-nourished. No distress.   HENT:   Head: Normocephalic and atraumatic.   Eyes: Conjunctivae are normal. Right eye exhibits no discharge. Left eye exhibits no discharge. No scleral icterus.   Cardiovascular: Normal rate, regular rhythm and normal heart sounds.  Exam reveals no gallop and no friction rub.    No murmur heard.  Pulmonary/Chest: Effort normal and breath sounds normal. No respiratory distress. She has no wheezes. She has no rales.   Musculoskeletal: She exhibits edema.   2+ pitting edema bilateral lower legs.    Neurological: She is alert and oriented to person, place, and time.   Skin: Skin is warm and dry. She is not diaphoretic.   Psychiatric: She has a normal mood and affect. Her behavior is normal.   Nursing note and vitals reviewed.      Assessment:     This provider spent more than 15 minutes face to face with patient, more than half the time for counseling and coordination of care as noted.  1. Weight loss, abnormal    2. 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 Discussed weight loss and normal BMI (25-26) for  "someone her age and why being slightly "overweight" can be protective. Concerned about her weight and wants to eat healthy without salt, due to elevated b/p in the past, given DASH diet.   Get labs done.   Discontinue Lasix. Your swelling may be handled by using compression socks and elevating your legs.       "

## 2017-08-25 NOTE — PROGRESS NOTES
Health Maintenance Due   Topic Date Due    Lipid Panel  09/07/1932    TETANUS VACCINE  09/07/1950    DEXA SCAN  09/07/1972    Zoster Vaccine  09/07/1992    Pneumococcal (65+) (2 of 2 - PPSV23) 03/15/2017    Influenza Vaccine  08/01/2017

## 2017-08-25 NOTE — PATIENT INSTRUCTIONS
Eating Heart-Healthy Food: Using the DASH Plan    Eating for your heart doesnt have to be hard or boring. You just need to know how to make healthier choices. The DASH eating plan has been developed to help you do just that. DASH stands for Dietary Approaches to Stop Hypertension. It is a plan that has been proven to be healthier for your heart and to lower your risk for high blood pressure. It can also help lower your risk for cancer, heart disease, osteoporosis, and diabetes.  Choosing from each food group  Choose foods from each of the food groups below each day. Try to get the recommended number of servings for each food group. The serving numbers are based on a diet of 2,000 calories a day. Talk to your doctor if youre unsure about your calorie needs. Along with getting the correct servings, the DASH plan also recommends a sodium intake less than 2,300 mg per day.        Grains  Servings: 6 to 8 a day  A serving is:  · 1 slice bread  · 1 ounce dry cereal  · Half a cup cooked rice, pasta or cereal  Best choices: Whole grains and any grains high in fiber. Vegetables  Servings: 4 to 5 a day  A serving is:  · 1 cup raw leafy vegetable  · Half a cup cut-up raw or cooked vegetable  · Half a cup vegetable juice  Best choices: Fresh or frozen vegetables prepared without added salt or fat.   Fruits  Servings: 4 to 5 a day  A serving is:  · 1 medium fruit  · One-quarter cup dried fruit  · Half a cup fresh, frozen, or canned fruit  · Half a cup of 100% fruit juices  Best choices: A variety of fresh fruits of different colors. Whole fruits are a better choice than fruit juices. Low-fat or fat-free dairy  Servings: 2 to 3 a day  A serving is:  · 1 cup milk  · 1 cup yogurt  · One and a half ounces cheese  Best choices: Skim or 1% milk, low-fat or fat-free yogurt or buttermilk, and low-fat cheeses.         Lean meats, poultry, fish  Servings: 6 or fewer a day  A serving is:  · 1 ounce cooked meats, poultry, or fish  · 1  egg  Best choices: Lean poultry and fish. Trim away visible fat. Broil, grill, roast, or boil instead of frying. Remove skin from poultry before eating. Limit how much red meat you eat.  Nuts, seeds, beans  Servings: 4 to 5 a week  A serving is:  · One-third cup nuts (one and a half ounces)  · 2 tablespoons nut butter or seeds  · Half a cup cooked dry beans or legumes  Best choices: Dry roasted nuts with no salt added, lentils, kidney beans, garbanzo beans, and whole keyes beans.   Fats and oils  Servings: 2 to 3 a day  A serving is:  · 1 teaspoon vegetable oil  · 1 teaspoon soft margarine  · 1 tablespoon mayonnaise  · 2 tablespoons salad dressing  Best choices: Nut and vegetable oils (nontropical vegetable oils), such as olive and canola oil. Sweets  Servings: 5 a week or fewer  A serving is:  · 1 tablespoon sugar, maple syrup, or honey  · 1 tablespoon jam or jelly  · 1 half-ounce jelly beans (about 15)  · 1 cup lemonade  Best choices: Dried fruit can be a satisfying sweet. Choose low-fat sweets. And watch your serving sizes!      For more on the DASH eating plan, visit:  www.nhlbi.nih.gov/health/health-topics/topics/dash   Date Last Reviewed: 6/1/2016  © 4965-3077 [employer]. [address] [place] [address], [place] [address]. All rights reserved. This information is not intended as a substitute for professional medical care. Always follow your healthcare professional's instructions.

## 2017-08-28 ENCOUNTER — TELEPHONE (OUTPATIENT)
Dept: FAMILY MEDICINE | Facility: CLINIC | Age: 82
End: 2017-08-28

## 2017-08-28 NOTE — TELEPHONE ENCOUNTER
----- Message from Noris Saldana sent at 8/28/2017 12:06 PM CDT -----  Contact: self  Patient [phone] is calling to speak with a nurse concerning medication that was prescribed this past Friday 08 25 17/please call

## 2017-08-28 NOTE — TELEPHONE ENCOUNTER
Spoke with patient.  Confused about Kenalog cream.  Explained this is an old scription and was not given to her on Friday.

## 2017-08-30 ENCOUNTER — TELEPHONE (OUTPATIENT)
Dept: FAMILY MEDICINE | Facility: CLINIC | Age: 82
End: 2017-08-30

## 2017-08-30 NOTE — TELEPHONE ENCOUNTER
----- Message from Jess Zelaya sent at 8/30/2017  9:15 AM CDT -----  Patient requesting to speak with nurse for instructions on taking medication/please call back at [phone] to advise.

## 2017-08-30 NOTE — TELEPHONE ENCOUNTER
Spoke with patient.  Confused again about kenalog cream.  Explained that it was old script for wound she had on elbow. She does not need to use at this time. Recommended she throw away.

## 2017-09-13 DIAGNOSIS — J45.20 MILD INTERMITTENT INTRINSIC ASTHMA WITHOUT COMPLICATION: ICD-10-CM

## 2017-09-14 ENCOUNTER — TELEPHONE (OUTPATIENT)
Dept: FAMILY MEDICINE | Facility: CLINIC | Age: 82
End: 2017-09-14

## 2017-09-14 RX ORDER — BUDESONIDE AND FORMOTEROL FUMARATE DIHYDRATE 80; 4.5 UG/1; UG/1
AEROSOL RESPIRATORY (INHALATION)
Qty: 10.2 G | Refills: 0 | Status: SHIPPED | OUTPATIENT
Start: 2017-09-14 | End: 2017-11-10 | Stop reason: SDUPTHER

## 2017-09-14 NOTE — TELEPHONE ENCOUNTER
Patient called and stated that she has quite taking the Lasix. She wants to know if she should keep taken the potassium.

## 2017-09-14 NOTE — TELEPHONE ENCOUNTER
She can stop taking the potassium, but thin in 1 week, she should get her blood work done to see if she needs the potassium or not, because her previous labs had low potassium, even when she was taking it.

## 2017-09-20 ENCOUNTER — TELEPHONE (OUTPATIENT)
Dept: FAMILY MEDICINE | Facility: CLINIC | Age: 82
End: 2017-09-20

## 2017-09-20 NOTE — TELEPHONE ENCOUNTER
----- Message from Ivanna Dumont sent at 9/20/2017  2:57 PM CDT -----  Patient is calling to ask a few questions concerning her medications. Please call back to advise at [phone]. She also states that she called office last 9/14/17 but she never did get a call back.

## 2017-10-02 ENCOUNTER — TELEPHONE (OUTPATIENT)
Dept: FAMILY MEDICINE | Facility: CLINIC | Age: 82
End: 2017-10-02

## 2017-10-02 NOTE — TELEPHONE ENCOUNTER
----- Message from London Thrasher sent at 10/2/2017  8:50 AM CDT -----  Contact: Nga Jennings with [facility] want to speak with a nurse regarding Diabetes Testing Supply for patient. Please call back at [phone]

## 2017-10-02 NOTE — TELEPHONE ENCOUNTER
----- Message from Thaddeus Chávez sent at 9/29/2017  3:04 PM CDT -----  Contact: same  Patient called in and requested a message be sent over regarding some test that were ordered and needs some explanation as to what they are and also some new medication she stated she does not know what that is for either.  Patient call back number is [phone]

## 2017-10-03 NOTE — TELEPHONE ENCOUNTER
Please contact patient and find out what diabetic supplies she needs and the brand name of her meter as none of this is in the computer.

## 2017-10-03 NOTE — TELEPHONE ENCOUNTER
----- Message from Keely Calderon sent at 10/3/2017 11:17 AM CDT -----  Contact: self [phone]  She is calling back to follow up on the request for the diabetic supplies.  Please call her with the status of the prior auth for it.  Thank you!

## 2017-10-03 NOTE — TELEPHONE ENCOUNTER
Not on medication list.  New order request sent yesterday to provider.  This is a non urgent request.

## 2017-10-04 ENCOUNTER — DOCUMENTATION ONLY (OUTPATIENT)
Dept: FAMILY MEDICINE | Facility: CLINIC | Age: 82
End: 2017-10-04

## 2017-10-04 NOTE — PROGRESS NOTES
Called patient by phone and explained that she does not have diabetes and does not need to be checking her blood sugar. She is not on any anti-diabetic medications, but has been checking her blood sugar daily. It has been  every time. Told her she can stop doing that. We will get regular labs and if we see any indication of diabetes, we will let her know.

## 2017-10-12 ENCOUNTER — TELEPHONE (OUTPATIENT)
Dept: FAMILY MEDICINE | Facility: CLINIC | Age: 82
End: 2017-10-12

## 2017-10-12 DIAGNOSIS — J45.20 MILD INTERMITTENT INTRINSIC ASTHMA WITHOUT COMPLICATION: ICD-10-CM

## 2017-10-12 RX ORDER — BUDESONIDE AND FORMOTEROL FUMARATE DIHYDRATE 80; 4.5 UG/1; UG/1
AEROSOL RESPIRATORY (INHALATION)
Qty: 10.2 G | Refills: 0 | Status: SHIPPED | OUTPATIENT
Start: 2017-10-12 | End: 2017-11-10 | Stop reason: SDUPTHER

## 2017-10-13 ENCOUNTER — NURSE TRIAGE (OUTPATIENT)
Dept: ADMINISTRATIVE | Facility: CLINIC | Age: 82
End: 2017-10-13

## 2017-10-13 NOTE — TELEPHONE ENCOUNTER
----- Message from Kary Brar sent at 10/13/2017 12:57 PM CDT -----  Contact: Patient   Jennifer, patient [phone], Calling about reaction to medication Rx  atorvastatin (LIPITOR) 10 MG tablet,is having forgetful, sleeplessness, agitated and it seems to be irritating her asthma. Still taking the medication. Please advise. Thanks.  Transferred to [facility] on call nurse for further triage.

## 2017-10-13 NOTE — TELEPHONE ENCOUNTER
Reason for Disposition   Caller has NON-URGENT medication question about med that PCP prescribed and triager unable to answer question    Protocols used: ST MEDICATION QUESTION CALL-A-AH    Pt states she thinks she is having side effects to atorvastatin. Side effects are asthma, worse sob, forgetful, agitation. Pt also states the paper says she should watch blood sugar and have blood work done. Care advice given, please call pt to advise.

## 2017-10-14 NOTE — TELEPHONE ENCOUNTER
Patient was given 30 tabs in June without refills. If she is just starting them now, she is not really using them and she can stop it.

## 2017-10-24 ENCOUNTER — TELEPHONE (OUTPATIENT)
Dept: FAMILY MEDICINE | Facility: CLINIC | Age: 82
End: 2017-10-24

## 2017-10-24 NOTE — TELEPHONE ENCOUNTER
----- Message from Kary Brar sent at 10/23/2017  3:14 PM CDT -----  Contact: Patient  Jennfier, patient [phone], Patient is calling to get a handicap license plate form filled out for a hanging tag. Please advise. And mail to patient. Thanks.  [address]  [place] [address]

## 2017-10-25 ENCOUNTER — TELEPHONE (OUTPATIENT)
Dept: FAMILY MEDICINE | Facility: CLINIC | Age: 82
End: 2017-10-25

## 2017-10-25 NOTE — TELEPHONE ENCOUNTER
Patient would like to know if you will order an Xray of her shoulder where it was injured at [facility].She would like the xray before she comes for a visit.

## 2017-10-25 NOTE — TELEPHONE ENCOUNTER
We don't usually do this because it can lead to a lot of wasting of  money.  I don't know which shoulder it was.

## 2017-10-30 ENCOUNTER — TELEPHONE (OUTPATIENT)
Dept: FAMILY MEDICINE | Facility: CLINIC | Age: 82
End: 2017-10-30

## 2017-10-30 RX ORDER — TRIAMCINOLONE ACETONIDE 1 MG/G
OINTMENT TOPICAL 2 TIMES DAILY
Qty: 30 G | Refills: 0 | Status: SHIPPED | OUTPATIENT
Start: 2017-10-30 | End: 2017-10-31 | Stop reason: SDUPTHER

## 2017-10-30 NOTE — TELEPHONE ENCOUNTER
----- Message from Nevin Garcia sent at 10/27/2017 11:28 AM CDT -----  Contact: Patient  Patient is returning phone call from doctors office.  Call Back#[phone]  Thanks

## 2017-10-30 NOTE — TELEPHONE ENCOUNTER
----- Message from Alfredo Lassiter sent at 10/30/2017 11:12 AM CDT -----  Contact: Self   Wants to have a R  triamcinolone acetonide 0.1% (KENALOG) 0.1 % ointment  Please call her to let her know when sent [phone] (home)     [facility] [address] [place] [address] & [address] [address]  Phone: [phone] Fax: [phone]

## 2017-10-31 NOTE — TELEPHONE ENCOUNTER
----- Message from Vicki Lancaster sent at 10/31/2017  1:40 PM CDT -----  Contact: self  Would like Alexa to call back regarding medication. Please call back at [phone] (vtyz)

## 2017-11-01 RX ORDER — TRIAMCINOLONE ACETONIDE 1 MG/G
OINTMENT TOPICAL 2 TIMES DAILY
Qty: 30 G | Refills: 0 | Status: SHIPPED | OUTPATIENT
Start: 2017-11-01 | End: 2018-05-29

## 2017-11-08 ENCOUNTER — TELEPHONE (OUTPATIENT)
Dept: FAMILY MEDICINE | Facility: CLINIC | Age: 82
End: 2017-11-08

## 2017-11-08 NOTE — TELEPHONE ENCOUNTER
----- Message from Ivanna Dumont sent at 11/7/2017 11:28 AM CST -----  Patient would like to speak to Alexa concerning her test results. Please call back at [phone].

## 2017-11-10 ENCOUNTER — OFFICE VISIT (OUTPATIENT)
Dept: FAMILY MEDICINE | Facility: CLINIC | Age: 82
End: 2017-11-10
Payer: MEDICARE

## 2017-11-10 ENCOUNTER — DOCUMENTATION ONLY (OUTPATIENT)
Dept: FAMILY MEDICINE | Facility: CLINIC | Age: 82
End: 2017-11-10

## 2017-11-10 VITALS
HEIGHT: 62 IN | OXYGEN SATURATION: 95 % | RESPIRATION RATE: 16 BRPM | HEART RATE: 75 BPM | DIASTOLIC BLOOD PRESSURE: 61 MMHG | BODY MASS INDEX: 22.55 KG/M2 | SYSTOLIC BLOOD PRESSURE: 95 MMHG | TEMPERATURE: 98 F | WEIGHT: 122.56 LBS

## 2017-11-10 DIAGNOSIS — R07.89 ATYPICAL CHEST PAIN: ICD-10-CM

## 2017-11-10 DIAGNOSIS — B35.4 TINEA CORPORIS: ICD-10-CM

## 2017-11-10 DIAGNOSIS — Z87.09 HISTORY OF ACUTE RESPIRATORY FAILURE: ICD-10-CM

## 2017-11-10 DIAGNOSIS — J45.20 MILD INTERMITTENT INTRINSIC ASTHMA WITHOUT COMPLICATION: ICD-10-CM

## 2017-11-10 DIAGNOSIS — J20.9 ACUTE BRONCHITIS, UNSPECIFIED ORGANISM: ICD-10-CM

## 2017-11-10 DIAGNOSIS — R07.89 ATYPICAL CHEST PAIN: Primary | ICD-10-CM

## 2017-11-10 PROCEDURE — 93010 ELECTROCARDIOGRAM REPORT: CPT | Mod: ,,, | Performed by: INTERNAL MEDICINE

## 2017-11-10 PROCEDURE — 93005 ELECTROCARDIOGRAM TRACING: CPT | Mod: S$GLB,,, | Performed by: INTERNAL MEDICINE

## 2017-11-10 PROCEDURE — 99214 OFFICE O/P EST MOD 30 MIN: CPT | Mod: S$GLB,,, | Performed by: INTERNAL MEDICINE

## 2017-11-10 RX ORDER — FLUCONAZOLE 100 MG/1
100 TABLET ORAL DAILY
Qty: 30 TABLET | Refills: 0 | Status: SHIPPED | OUTPATIENT
Start: 2017-11-10 | End: 2017-12-10

## 2017-11-10 RX ORDER — BENZONATATE 200 MG/1
200 CAPSULE ORAL 3 TIMES DAILY PRN
Qty: 30 CAPSULE | Refills: 1 | Status: SHIPPED | OUTPATIENT
Start: 2017-11-10 | End: 2017-11-20

## 2017-11-10 RX ORDER — NITROGLYCERIN 0.4 MG/1
0.4 TABLET SUBLINGUAL EVERY 5 MIN PRN
Qty: 20 TABLET | Refills: 1 | Status: SHIPPED | OUTPATIENT
Start: 2017-11-10 | End: 2017-11-10 | Stop reason: SDUPTHER

## 2017-11-10 RX ORDER — PREDNISONE 20 MG/1
40 TABLET ORAL DAILY
Qty: 4 TABLET | Refills: 0 | Status: SHIPPED | OUTPATIENT
Start: 2017-11-10 | End: 2017-11-12

## 2017-11-10 NOTE — PATIENT INSTRUCTIONS
Cool mist vaporizor.  Hot fluids. Hot tea with lemon and honey.  Take the Tessalon Perles with Robitussin-DM for best results    If you get a chest pain last more than 5 minutes taken nitroglycerin and some Mylanta.  If it lasts more than 20 minutes call 911    Continue baby aspirin daily    Stop Lipitor for one week while on Diflucan  Lamisil cream twice a day under your breasts.  Everardo's Butt paste under your breasts to prevent it from returning  .

## 2017-11-10 NOTE — PROGRESS NOTES
Subjective:       Patient ID: Jennifer Diaz is a 85 y.o. female.    Chief Complaint: Rash (under breast); Cough (hoarseness); and Chest Pain (last night)    HPI         CHIEF COMPLAINT: CHEST PAIN    HPI: was rushing. Felt like pin pricks.     ONSET/TIMINd    ago    DURATION:  2 mins  QUALITY/COURSE:  unchanged.    SEVERITY: #   3  /10 ( on 1 to 10 scale)    PREVIOUS CARDIAC TESTING: :     LOCATION:  substernal   Radiation -  none    All choices for next 4 sections below are positive to the patient ONLY if BOLDED, otherwise negative:    AGGRAVATING FACTORS: Swallowing, , Deep_Breathing, Coughing, Neck/Arm/Chest_Movement     RELIEVING FACTORS: Nitroglycerin, Resting, Change_of_Position    ASSOCIATED SYMPTOMS:  Hemoptysis,Tenderness,  Leg_Pain    RISK FACTORS: Diabetes, HTN, Hyperlipidemia, smoking,           CHIEF COMPLAINT: Cough(+).  HPI: swallowed orange juice     ONSET/TIMING: . 5 hrs    ago    DURATION:               Paroxysmal: no .    QUALITY/COURSE:. unchanged    INTENSITY/SEVERITY: Severity is #  2 (10 point scale)      The following symptoms/statements are positive if BOLD, negative otherwise.      CONTEXT/WHEN:  Tobacco_use. Smokers_in_home. Seasonal_pattern. Allergies/Hayfever. Sinusitis. Irritant_Exposure(smoke/dust/fumes). Exposure_to_others_with_similar_symptoms.        Similar_problems_in_past.   PAST TREATMENT OR EVALUATION:   previous PPD. Recent_previous_chest_x-ray. Recent_antibiotics.  Associated Symptoms:     sputum production: scant. copious. Hemoptysis.  Medical History: Past_pulmonary_infections.  Cardiovascular_disease.chronic_lung_disease.  tuberculosis. Asthma. AIDS. Gastroesophageal_reflux_disease .              CHIEF COMPLAINT: Rash  HPI:     ONSET/TIMING: Onset    2 weeks        ago. Sudden: no.. Work related: no. Similar_problems_in_the_past: no.    DURATION:  Continuous..    QUALITY/COURSE:   unchanged  .     LOCATION:     .  Under both breasts    INTENSITY/SEVERITY:   "Severity is #   2    (10 point scale).    CONTEXT/WHEN: .--Similar problems: no . .  Past treatments: none  . Exposure_to_others_with_similar_symptoms: no . . Exposure_to_poison _ivy: no. .   New exposures (soaps, lotions, laundry detergents, foods, medications, plants, insects or animals).    SYMPTOMS/RELATED: .--Possible medication side effect:    The following symptoms are positive if BOLD, negative otherwise.     REVIEW OF SYMPTOMS:   Sharp_pain. Dull_pain. Burning_pain.  Erythema-Skin. Hypopigmentation.  hyperpigmentation . Inflammation. Herald_Patch.. fixed . evanescent.  Blisters. Purulence. Fever. Fatigue. Tick_Bites.                     Review of Systems   Constitutional: Negative for chills, diaphoresis, fever and unexpected weight change.   HENT: Positive for postnasal drip. Negative for rhinorrhea, sinus pressure, sore throat and trouble swallowing.    Respiratory: Positive for cough. Negative for shortness of breath and wheezing.    Cardiovascular: Positive for chest pain. Negative for palpitations and leg swelling.   Gastrointestinal: Negative for nausea and vomiting.   Musculoskeletal: Negative for myalgias.   Neurological: Negative for dizziness, syncope, light-headedness and numbness.   Psychiatric/Behavioral: Negative for dysphoric mood. The patient is not nervous/anxious.        Objective:      Vitals:    11/10/17 1457   BP: 95/61   Pulse: 75   Resp: 16   Temp: 98.1 °F (36.7 °C)   TempSrc: Oral   SpO2: 95%   Weight: 55.6 kg (122 lb 9.2 oz)   Height: 5' 2" (1.575 m)   PainSc: 0-No pain     Physical Exam   Constitutional: She appears well-developed and well-nourished.   Eyes: Pupils are equal, round, and reactive to light.   Cardiovascular: Normal rate, regular rhythm and normal heart sounds.    Pulmonary/Chest: Effort normal and breath sounds normal. She exhibits no tenderness.   Abdominal: Soft. There is no tenderness.   Musculoskeletal: She exhibits no edema.   Neurological: She is alert. "   Psychiatric: She has a normal mood and affect. Her behavior is normal. Thought content normal.   Nursing note and vitals reviewed.   addendum: 12/1/2017: The patient was noted to be unable to ambulate without extreme difficulty.  Her posture was very poor.  The patient is deemed appropriate to have a walker.      Assessment:       1. Atypical chest pain    2. History of acute respiratory failure    3. Tinea corporis    4. Acute bronchitis, unspecified organism          Plan:     Atypical chest pain  -     Ambulatory referral to Cardiology  -     nitroGLYCERIN (NITROSTAT) 0.4 MG SL tablet; Place 1 tablet (0.4 mg total) under the tongue every 5 (five) minutes as needed for Chest pain.  Dispense: 20 tablet; Refill: 1  -     EKG 12-lead    History of acute respiratory failure    Tinea corporis  -     fluconazole (DIFLUCAN) 100 MG tablet; Take 1 tablet (100 mg total) by mouth once daily.  Dispense: 30 tablet; Refill: 0    Acute bronchitis, unspecified organism  -     predniSONE (DELTASONE) 20 MG tablet; Take 2 tablets (40 mg total) by mouth once daily.  Dispense: 4 tablet; Refill: 0  -     benzonatate (TESSALON) 200 MG capsule; Take 1 capsule (200 mg total) by mouth 3 (three) times daily as needed for Cough.  Dispense: 30 capsule; Refill: 1      Return for if you are not better return in one week.

## 2017-11-12 RX ORDER — NITROGLYCERIN 0.4 MG/1
TABLET SUBLINGUAL
Qty: 275 TABLET | Refills: 1 | Status: SHIPPED | OUTPATIENT
Start: 2017-11-12 | End: 2019-08-28 | Stop reason: CLARIF

## 2017-11-12 RX ORDER — BUDESONIDE AND FORMOTEROL FUMARATE DIHYDRATE 80; 4.5 UG/1; UG/1
AEROSOL RESPIRATORY (INHALATION)
Qty: 10.2 G | Refills: 0 | Status: SHIPPED | OUTPATIENT
Start: 2017-11-12 | End: 2018-02-20

## 2017-11-13 ENCOUNTER — TELEPHONE (OUTPATIENT)
Dept: FAMILY MEDICINE | Facility: CLINIC | Age: 82
End: 2017-11-13

## 2017-11-13 NOTE — TELEPHONE ENCOUNTER
I'll be happy to make a referral to orthopedics if she will tell me where she is hurting, ie.  if she will say which shoulder it is.  There is nothing in the notes.

## 2017-11-13 NOTE — TELEPHONE ENCOUNTER
----- Message from Thaddeus Chávez sent at 11/13/2017 10:58 AM CST -----  Contact: same  Patient called in and requested a message be sent over regarding getting a referral for her shoulder and also patient stated she did not get the Rx for the cream for her rash.  Patient call back number is [phone]

## 2017-11-14 NOTE — TELEPHONE ENCOUNTER
Patient says that the pharmacy told her there is a cream that goes along with the antifungal medication and she wants to know if you will call it in.[facility] [address].

## 2017-11-14 NOTE — TELEPHONE ENCOUNTER
----- Message from Key Ayers sent at 11/14/2017 12:53 PM CST -----  Contact: elf  Patient is needing to speak to a nurse   Please call back [phone]

## 2017-11-15 ENCOUNTER — TELEPHONE (OUTPATIENT)
Dept: FAMILY MEDICINE | Facility: CLINIC | Age: 82
End: 2017-11-15

## 2017-11-15 NOTE — TELEPHONE ENCOUNTER
----- Message from Cindy Perry sent at 11/15/2017 11:13 AM CST -----  Contact: Gene Sylvester [phone]  Patient was at [facility] asking for the cream that the doctor told her to get but told Bill she didn't have time to call you.  Please either call Gene at [phone] at [facility] with the name of the cream or call the patient at [phone] (home).  Thank you!

## 2017-11-15 NOTE — TELEPHONE ENCOUNTER
----- Message from Cindy Perry sent at 11/15/2017 10:28 AM CST -----  Contact: Self  Patient is calling back to see if Alexa was able to get the information she requested yesterday.  Please call [phone] (home).  Thank you!

## 2017-11-15 NOTE — TELEPHONE ENCOUNTER
Notified patient that the cream she was asking about is Lotrisone cream over the counter.She understood.

## 2017-11-24 ENCOUNTER — TELEPHONE (OUTPATIENT)
Dept: FAMILY MEDICINE | Facility: CLINIC | Age: 82
End: 2017-11-24

## 2017-11-24 NOTE — TELEPHONE ENCOUNTER
----- Message from Jess Chavez sent at 11/22/2017 10:12 AM CST -----  Patient is requesting a return call she needs immediate referral to occupational therapy contact patient at [phone].    Thank you

## 2017-11-29 ENCOUNTER — TELEPHONE (OUTPATIENT)
Dept: FAMILY MEDICINE | Facility: CLINIC | Age: 82
End: 2017-11-29

## 2017-11-29 DIAGNOSIS — R29.818 DISABILITY DUE TO NEUROLOGICAL DISORDER: Primary | ICD-10-CM

## 2017-11-29 NOTE — TELEPHONE ENCOUNTER
The last time she was here she was having a lot of shortness of breath.  I need to verify that she is capable of doing the physical therapy before I order.  She needs to be seen

## 2017-11-29 NOTE — TELEPHONE ENCOUNTER
----- Message from Alfredo Lassiter sent at 11/28/2017  3:35 PM CST -----  Contact: ranjan Lucio in regards to medical equipment she requested. Call her back [phone] (home)   Thanks!

## 2017-11-29 NOTE — TELEPHONE ENCOUNTER
----- Message from Jess Chavez sent at 11/28/2017  2:35 PM CST -----  Patient is requesting an order for a walker, she states she is unable to walk without assistance, contact patient at [phone].    Thank you

## 2017-11-29 NOTE — TELEPHONE ENCOUNTER
----- Message from Shikha Russ sent at 11/29/2017 10:32 AM CST -----  Contact: Izzy queen/ [employer]  Izzy queen/ [employer] calling in regards to the member requesting a walker with a seat and Outpatient Physical Therapy. She is requesting new prescriptions for both. Please advise.  Call back Izzy at  ext 2  Fax   Thanks!

## 2017-12-01 ENCOUNTER — TELEPHONE (OUTPATIENT)
Dept: FAMILY MEDICINE | Facility: CLINIC | Age: 82
End: 2017-12-01

## 2017-12-01 NOTE — TELEPHONE ENCOUNTER
----- Message from Noris Martino sent at 11/30/2017  1:14 PM CST -----  Contact: Mckenzie [facility]  Calling as patient called to let know physical therapy orders and order for a walker with seat were to be sent to [facility] for approval. Please call [phone]. Fax [phone]. Thanks!

## 2017-12-05 ENCOUNTER — TELEPHONE (OUTPATIENT)
Dept: FAMILY MEDICINE | Facility: CLINIC | Age: 82
End: 2017-12-05

## 2017-12-05 NOTE — TELEPHONE ENCOUNTER
----- Message from Keely Calderon sent at 12/4/2017  1:35 PM CST -----  Contact: self [phone]  Please call her regarding the forms that were sent to you by her insurance.  Thank you!

## 2017-12-06 NOTE — TELEPHONE ENCOUNTER
----- Message from Dana Chung sent at 12/6/2017  8:52 AM CST -----  [facility], they states they have left several messages regarding this matter, please call Nurys, [phone]

## 2017-12-07 NOTE — TELEPHONE ENCOUNTER
I need a diagnosis to make the order.  For what is the physical therapy.  This patient has multiple problems.

## 2017-12-12 ENCOUNTER — TELEPHONE (OUTPATIENT)
Dept: FAMILY MEDICINE | Facility: CLINIC | Age: 82
End: 2017-12-12

## 2017-12-12 DIAGNOSIS — R26.9 GAIT DISTURBANCE: Primary | ICD-10-CM

## 2017-12-12 DIAGNOSIS — M25.519 SHOULDER PAIN, UNSPECIFIED CHRONICITY, UNSPECIFIED LATERALITY: ICD-10-CM

## 2017-12-14 DIAGNOSIS — J45.20 MILD INTERMITTENT INTRINSIC ASTHMA WITHOUT COMPLICATION: ICD-10-CM

## 2017-12-14 RX ORDER — BUDESONIDE AND FORMOTEROL FUMARATE DIHYDRATE 80; 4.5 UG/1; UG/1
AEROSOL RESPIRATORY (INHALATION)
Qty: 10.2 G | Refills: 0 | Status: SHIPPED | OUTPATIENT
Start: 2017-12-14 | End: 2018-01-10 | Stop reason: SDUPTHER

## 2017-12-18 DIAGNOSIS — J31.0 RHINITIS, CHRONIC: ICD-10-CM

## 2017-12-18 RX ORDER — FLUTICASONE PROPIONATE 50 MCG
SPRAY, SUSPENSION (ML) NASAL
Qty: 1 BOTTLE | Refills: 5 | Status: SHIPPED | OUTPATIENT
Start: 2017-12-18 | End: 2018-08-02

## 2018-01-05 DIAGNOSIS — J45.20 MILD INTERMITTENT INTRINSIC ASTHMA WITHOUT COMPLICATION: ICD-10-CM

## 2018-01-05 RX ORDER — CLONAZEPAM 0.5 MG/1
TABLET ORAL
Qty: 180 TABLET | Refills: 0 | OUTPATIENT
Start: 2018-01-05

## 2018-01-08 ENCOUNTER — TELEPHONE (OUTPATIENT)
Dept: FAMILY MEDICINE | Facility: CLINIC | Age: 83
End: 2018-01-08

## 2018-01-08 NOTE — TELEPHONE ENCOUNTER
----- Message from Antonette Hart sent at 1/8/2018  3:18 PM CST -----  Contact: self   Placed call to pod, patient was disconnected was talking with nurse please call back at [phone] (home)

## 2018-01-08 NOTE — TELEPHONE ENCOUNTER
----- Message from Dana Spring sent at 1/5/2018  2:52 PM CST -----  Contact: self  Patient called regarding discussing prescription information. Please contact [phone] (tevv)

## 2018-01-08 NOTE — TELEPHONE ENCOUNTER
----- Message from Yumiko Tripathi sent at 1/8/2018  3:59 PM CST -----    Please call  [phone]   Placed a call to the  Pod // pt  Is calling   With  A  Fax  # [phone] for   Physical  theraphy // please  Fax  order

## 2018-01-08 NOTE — TELEPHONE ENCOUNTER
Spoke with patient.  States she needs refill on clonazepam.  States she takes 1/4 pill every morning.  Spoke with Dr Mart.  Patient is not on clonazepam and he is unable to prescribe due to dizziness.  Patient notified.

## 2018-01-09 ENCOUNTER — TELEPHONE (OUTPATIENT)
Dept: FAMILY MEDICINE | Facility: CLINIC | Age: 83
End: 2018-01-09

## 2018-01-09 NOTE — TELEPHONE ENCOUNTER
----- Message from Leia Bower sent at 1/9/2018  3:14 PM CST -----  Contact: Patient  Patient, Jennifer Diaz  [phone].  Patient is calling for a refill on Rx  Clonazetam 25 mg.  (not on chart).  PATIENT IS OUT OF THE MEDICATION. Please advise.    [facility] [address] [place] [address] & [address] [address]  Phone: [phone] Fax: [phone]

## 2018-01-10 ENCOUNTER — OFFICE VISIT (OUTPATIENT)
Dept: FAMILY MEDICINE | Facility: CLINIC | Age: 83
End: 2018-01-10
Payer: MEDICARE

## 2018-01-10 VITALS
DIASTOLIC BLOOD PRESSURE: 61 MMHG | BODY MASS INDEX: 22.23 KG/M2 | OXYGEN SATURATION: 96 % | WEIGHT: 120.81 LBS | HEIGHT: 62 IN | TEMPERATURE: 98 F | HEART RATE: 62 BPM | SYSTOLIC BLOOD PRESSURE: 107 MMHG

## 2018-01-10 DIAGNOSIS — Z78.0 POSTMENOPAUSAL: ICD-10-CM

## 2018-01-10 DIAGNOSIS — G47.00 INSOMNIA, UNSPECIFIED TYPE: Primary | ICD-10-CM

## 2018-01-10 PROBLEM — J96.01 ACUTE HYPOXEMIC RESPIRATORY FAILURE: Status: RESOLVED | Noted: 2017-02-27 | Resolved: 2018-01-10

## 2018-01-10 PROCEDURE — 99213 OFFICE O/P EST LOW 20 MIN: CPT | Mod: S$GLB,,, | Performed by: INTERNAL MEDICINE

## 2018-01-10 RX ORDER — CLONAZEPAM 0.5 MG/1
1.12 TABLET ORAL NIGHTLY PRN
Qty: 10 TABLET | Refills: 0 | Status: SHIPPED | OUTPATIENT
Start: 2018-01-10 | End: 2018-02-20 | Stop reason: SDUPTHER

## 2018-01-10 NOTE — PATIENT INSTRUCTIONS
Absolutely no driving on Klonopin.    [employer]btforinsomnia[employer] has a website that has a course that teaches you how to sleep.  I highly recommend lit.

## 2018-01-10 NOTE — PROGRESS NOTES
"Subjective:       Patient ID: Jennifer Diaz is a 85 y.o. female.    Chief Complaint: Medication Refill    HPI     CHIEF COMPLAINT: insomnia .  HPI: Has been taking a quarter of a milligram of Klonopin to go to sleep for year on an as-needed basis    ONSET/TIMING: Onset   2 years      ago. Sudden: no .  Similar problems in past: no. ..    DURATION:  Intermittent    QUALITY/COURSE: .unchanged.     Can't fall asleep: yes. . Wakes up early: yes.      INTENSITY/SEVERITY:  Severity 5    (on a 1-10 scale).      MODIFIERS/TREATMENTS: . Taking medications: yes.  . Effective: yes.  SYMPTOMS/RELATED: . Possible medication side effects include:     .     The following symptoms/statements  are positive if BOLD, negative otherwise.      CONTEXT/WHEN:  .Nocturnal. . Napping.  shift work . Time_zone_changes.     REVIEW OF SYSTEMS :  .   nocturia . Restless_legs . pain . Obstructive_sleep_apnea  . depression . anxiety . Substance_abuse . Copd . Grief .       Review of Systems    Objective:      Vitals:    01/10/18 1453   BP: 107/61   Pulse: 62   Temp: 98.1 °F (36.7 °C)   TempSrc: Oral   SpO2: 96%   Weight: 54.8 kg (120 lb 13 oz)   Height: 5' 2" (1.575 m)   PainSc: 0-No pain     Physical Exam   Constitutional: She appears well-developed and well-nourished.   Eyes: Pupils are equal, round, and reactive to light.   Cardiovascular: Normal rate, regular rhythm and normal heart sounds.    Pulmonary/Chest: Effort normal and breath sounds normal.   Abdominal: Soft. There is no tenderness.   Neurological: She is alert.   Psychiatric: She has a normal mood and affect. Her behavior is normal. Thought content normal.   Nursing note and vitals reviewed.        Assessment:       1. Insomnia, unspecified type          Plan:     Insomnia, unspecified type  -     clonazePAM (KLONOPIN) 0.5 MG tablet; Take 2.5 tablets (1.25 mg total) by mouth nightly as needed for Anxiety.  Dispense: 10 tablet; Refill: 0      Return in about 3 months (around " 4/10/2018).

## 2018-01-12 DIAGNOSIS — J45.20 MILD INTERMITTENT INTRINSIC ASTHMA WITHOUT COMPLICATION: ICD-10-CM

## 2018-01-12 RX ORDER — BUDESONIDE AND FORMOTEROL FUMARATE DIHYDRATE 80; 4.5 UG/1; UG/1
AEROSOL RESPIRATORY (INHALATION)
Qty: 10.2 G | Refills: 0 | Status: SHIPPED | OUTPATIENT
Start: 2018-01-12 | End: 2018-02-20 | Stop reason: SDUPTHER

## 2018-02-02 DIAGNOSIS — G25.2 INTENTION TREMOR: ICD-10-CM

## 2018-02-02 RX ORDER — PROPRANOLOL HYDROCHLORIDE 20 MG/1
20 TABLET ORAL 2 TIMES DAILY
Qty: 180 TABLET | Refills: 1 | Status: SHIPPED | OUTPATIENT
Start: 2018-02-02 | End: 2018-08-02

## 2018-02-02 NOTE — TELEPHONE ENCOUNTER
----- Message from Jess Zelaya sent at 2/2/2018  9:06 AM CST -----  Patient stated [facility] in [place] needs doctor's authorization for medication: propranolol (INDERAL) 20 MG tablet/please call patient back at [phone] to confirm.

## 2018-02-16 DIAGNOSIS — G47.00 INSOMNIA, UNSPECIFIED TYPE: ICD-10-CM

## 2018-02-16 RX ORDER — CLONAZEPAM 0.5 MG/1
TABLET ORAL
Qty: 10 TABLET | Refills: 0 | OUTPATIENT
Start: 2018-02-16

## 2018-02-16 NOTE — TELEPHONE ENCOUNTER
----- Message from Alis Quinonez sent at 2/16/2018  4:16 PM CST -----  Patient states that she need to speak to you before scheduling an appointment.  Please call [phone].

## 2018-02-19 ENCOUNTER — TELEPHONE (OUTPATIENT)
Dept: FAMILY MEDICINE | Facility: CLINIC | Age: 83
End: 2018-02-19

## 2018-02-19 NOTE — TELEPHONE ENCOUNTER
----- Message from Kary Brar sent at 2/19/2018 10:15 AM CST -----  Contact: Patient  Jennifer, patient [phone], calling for a same day appointment this afternoon for a medication refill of Rx Med refill / clonazePAM (KLONOPIN) 0.5 MG tablet. Offered patient appointment on Tuesday and Wednesday , declined, stated will be out of medication. Please advise. Thanks.

## 2018-02-20 ENCOUNTER — DOCUMENTATION ONLY (OUTPATIENT)
Dept: FAMILY MEDICINE | Facility: CLINIC | Age: 83
End: 2018-02-20

## 2018-02-20 ENCOUNTER — OFFICE VISIT (OUTPATIENT)
Dept: FAMILY MEDICINE | Facility: CLINIC | Age: 83
End: 2018-02-20
Payer: MEDICARE

## 2018-02-20 VITALS
SYSTOLIC BLOOD PRESSURE: 118 MMHG | OXYGEN SATURATION: 94 % | RESPIRATION RATE: 16 BRPM | BODY MASS INDEX: 22.68 KG/M2 | WEIGHT: 123.25 LBS | DIASTOLIC BLOOD PRESSURE: 60 MMHG | HEART RATE: 70 BPM | TEMPERATURE: 99 F | HEIGHT: 62 IN

## 2018-02-20 DIAGNOSIS — G47.00 INSOMNIA, UNSPECIFIED TYPE: ICD-10-CM

## 2018-02-20 PROBLEM — L97.529 FOOT ULCER, LEFT: Status: RESOLVED | Noted: 2017-02-24 | Resolved: 2018-02-20

## 2018-02-20 PROCEDURE — 99213 OFFICE O/P EST LOW 20 MIN: CPT | Mod: S$GLB,,, | Performed by: INTERNAL MEDICINE

## 2018-02-20 PROCEDURE — 3008F BODY MASS INDEX DOCD: CPT | Mod: S$GLB,,, | Performed by: INTERNAL MEDICINE

## 2018-02-20 PROCEDURE — 1159F MED LIST DOCD IN RCRD: CPT | Mod: S$GLB,,, | Performed by: INTERNAL MEDICINE

## 2018-02-20 PROCEDURE — 1126F AMNT PAIN NOTED NONE PRSNT: CPT | Mod: S$GLB,,, | Performed by: INTERNAL MEDICINE

## 2018-02-20 RX ORDER — CLONAZEPAM 0.5 MG/1
TABLET ORAL
Qty: 10 TABLET | Refills: 2 | Status: SHIPPED | OUTPATIENT
Start: 2018-02-20 | End: 2018-05-29 | Stop reason: SDUPTHER

## 2018-02-20 NOTE — PROGRESS NOTES
"Subjective:       Patient ID: Jennifer Diaz is a 85 y.o. female.    Chief Complaint: Tremors (refills)    HPI   CHIEF COMPLAINT: insomnia .  HPI: much better with sleeping taking 1/4 of 0.5 mg klonopin. No falling.  Tremors better.     ONSET/TIMING: Onset       yrs  ago. Sudden: no .  Similar problems in past: no. ..    DURATION:  Intermittent    QUALITY/COURSE: .unchanged.     Can't fall asleep: yes. . Wakes up early: yes.      INTENSITY/SEVERITY:  Severity 5    (on a 1-10 scale).      MODIFIERS/TREATMENTS: . Taking medications: yes.  . Effective: yes.  SYMPTOMS/RELATED: . Possible medication side effects include:     .     The following symptoms/statements  are positive if BOLD, negative otherwise.      CONTEXT/WHEN:  .Nocturnal. . Napping.  shift work . Time_zone_changes.     REVIEW OF SYSTEMS :  .   nocturia . Restless_legs . pain . Obstructive_sleep_apnea  . depression . anxiety . Substance_abuse . Copd . Grief .       Review of Systems    Objective:      Vitals:    02/20/18 1552   BP: 118/60   Pulse: 70   Resp: 16   Temp: 99 °F (37.2 °C)   TempSrc: Oral   SpO2: (!) 94%   Weight: 55.9 kg (123 lb 3.8 oz)   Height: 5' 2" (1.575 m)   PainSc: 0-No pain     Physical Exam   Constitutional: She appears well-developed and well-nourished.   Cardiovascular: Normal rate, regular rhythm and normal heart sounds.    Pulmonary/Chest: Effort normal and breath sounds normal.   Abdominal: Soft. There is no tenderness.   Neurological: She is alert.   Psychiatric: She has a normal mood and affect. Her behavior is normal. Thought content normal.   Nursing note and vitals reviewed.        Assessment:       1. Insomnia, unspecified type          Plan:     Insomnia, unspecified type  -     clonazePAM (KLONOPIN) 0.5 MG tablet; 1/4 pill po qHS prn insomnia.  Dispense: 10 tablet; Refill: 2      Follow-up in about 3 months (around 5/20/2018).      "

## 2018-04-30 DIAGNOSIS — J44.9 CHRONIC OBSTRUCTIVE PULMONARY DISEASE, UNSPECIFIED COPD TYPE: ICD-10-CM

## 2018-04-30 RX ORDER — ALBUTEROL SULFATE 90 UG/1
2 AEROSOL, METERED RESPIRATORY (INHALATION) EVERY 6 HOURS PRN
Qty: 1 EACH | Refills: 11 | Status: SHIPPED | OUTPATIENT
Start: 2018-04-30 | End: 2019-06-26 | Stop reason: SDUPTHER

## 2018-04-30 NOTE — TELEPHONE ENCOUNTER
----- Message from Marly Chanel sent at 4/30/2018  4:50 PM CDT -----  Contact: PT  Pt Jennifer Diaz is calling to speak with nurse about a medical issue. Please call back and advise.    Call back# [phone]  thanks

## 2018-04-30 NOTE — TELEPHONE ENCOUNTER
----- Message from Lesa Jain sent at 4/30/2018  3:06 PM CDT -----  Contact: Pt  Type:  Patient Returning Call    Who Called:  Alexa  Who Left Message for Patient: Pt  Does the patient know what this is regarding?: refill  Best Call Back Number:  [phone] (home)   Additional Information:

## 2018-04-30 NOTE — TELEPHONE ENCOUNTER
----- Message from Noris Saldana sent at 4/30/2018  2:43 PM CDT -----  Contact: Koki Telles with [facility] [phone] is calling/patient is needing a new Ventolin prescription with instructions changed to every 4 to 6 hours instead of every 6 hours/patient is having a flare-up with her Asthma today and is completely out of her Ventolin/please send as soon as possible     [facility] [address] [place] [address] & [address] [address]  Phone: [phone] Fax: [phone]

## 2018-04-30 NOTE — TELEPHONE ENCOUNTER
----- Message from Thaddeus Chávez sent at 4/30/2018  1:38 PM CDT -----  Contact: same  Patient called in and stated she needs a refill on her Rx for Ventolin.  Patient stated she had to use it more often due to the weather.  Patient stated she has refills but [facility] stated it is too soon.  Patient stated they will refill if Dr. Mart calls them.    [facility] [address] [place] [address] & [address] [address]  Phone: [phone] Fax: [phone]    Patient call back number is [phone]

## 2018-05-03 ENCOUNTER — TELEPHONE (OUTPATIENT)
Dept: FAMILY MEDICINE | Facility: CLINIC | Age: 83
End: 2018-05-03

## 2018-05-03 NOTE — TELEPHONE ENCOUNTER
----- Message from Cristal Riley sent at 5/3/2018  3:08 PM CDT -----  Contact: Pt   States she's calling rg wanting a call back rg needing a refill on her medicine and can be reached at [phone]//thanks/benedicto / zeyad to speak with Alexa       1. What is the name of the medication you are requesting? Ventolin   2. What is the dose?   3. How do you take the medication? Orally, topically, etc? Inhaler   4. How often do you take this medication? As needed every 6 hrs  5. Do you need a 30 day or 90 day supply?   6. How many refills are you requesting?   7. What is your preferred pharmacy and location of the pharmacy?   8. Who can we contact with further questions? Pt       [facility] [address] [place] [address] & [address] [address]  Phone: [phone] Fax: [phone]

## 2018-05-04 NOTE — TELEPHONE ENCOUNTER
Spoke to pharmacy and it is too early to fill the ventolin inhaler.I called the patient and she knows,she will fill it Sunday.

## 2018-05-23 ENCOUNTER — TELEPHONE (OUTPATIENT)
Dept: FAMILY MEDICINE | Facility: CLINIC | Age: 83
End: 2018-05-23

## 2018-05-23 NOTE — TELEPHONE ENCOUNTER
----- Message from Yumiko Tripathi sent at 5/23/2018 11:55 AM CDT -----  Type:  RX Refill Request    Who Called: pt  Refill or New Rx: refill  RX Name and Strength:  Clonazepam How is the patient currently taking it? (ex. 1XDay):  1/4 a day  Is this a 30 day or 90 day RX:  90 day  Preferred Pharmacy with phone number:    [facility] [address] [place] [address] & [address] [address]  Phone: [phone] Fax: [phone]    Local or Mail Order:  local  Ordering Provider: scott Day Call Back Number:  [phone]  Additional Information:   Calling to  Get a  Refill

## 2018-05-24 DIAGNOSIS — G47.00 INSOMNIA, UNSPECIFIED TYPE: ICD-10-CM

## 2018-05-24 RX ORDER — CLONAZEPAM 0.5 MG/1
TABLET ORAL
Qty: 10 TABLET | Refills: 0 | OUTPATIENT
Start: 2018-05-24

## 2018-05-25 RX ORDER — IPRATROPIUM BROMIDE AND ALBUTEROL SULFATE 2.5; .5 MG/3ML; MG/3ML
SOLUTION RESPIRATORY (INHALATION)
Qty: 3420 ML | Refills: 5 | Status: SHIPPED | OUTPATIENT
Start: 2018-05-25 | End: 2018-11-08 | Stop reason: SDUPTHER

## 2018-05-29 ENCOUNTER — OFFICE VISIT (OUTPATIENT)
Dept: FAMILY MEDICINE | Facility: CLINIC | Age: 83
End: 2018-05-29
Payer: MEDICARE

## 2018-05-29 ENCOUNTER — DOCUMENTATION ONLY (OUTPATIENT)
Dept: FAMILY MEDICINE | Facility: CLINIC | Age: 83
End: 2018-05-29

## 2018-05-29 VITALS
BODY MASS INDEX: 21.75 KG/M2 | SYSTOLIC BLOOD PRESSURE: 100 MMHG | TEMPERATURE: 98 F | DIASTOLIC BLOOD PRESSURE: 66 MMHG | RESPIRATION RATE: 16 BRPM | OXYGEN SATURATION: 93 % | HEART RATE: 68 BPM | WEIGHT: 118.19 LBS | HEIGHT: 62 IN

## 2018-05-29 DIAGNOSIS — R25.1 TREMORS OF NERVOUS SYSTEM: Primary | ICD-10-CM

## 2018-05-29 DIAGNOSIS — R29.6 MULTIPLE FALLS: ICD-10-CM

## 2018-05-29 DIAGNOSIS — G47.00 INSOMNIA, UNSPECIFIED TYPE: ICD-10-CM

## 2018-05-29 PROCEDURE — 3074F SYST BP LT 130 MM HG: CPT | Mod: CPTII,S$GLB,, | Performed by: INTERNAL MEDICINE

## 2018-05-29 PROCEDURE — 99213 OFFICE O/P EST LOW 20 MIN: CPT | Mod: S$GLB,,, | Performed by: INTERNAL MEDICINE

## 2018-05-29 PROCEDURE — 3078F DIAST BP <80 MM HG: CPT | Mod: CPTII,S$GLB,, | Performed by: INTERNAL MEDICINE

## 2018-05-29 RX ORDER — CLONAZEPAM 0.5 MG/1
TABLET ORAL
Qty: 10 TABLET | Refills: 2 | Status: SHIPPED | OUTPATIENT
Start: 2018-05-29 | End: 2018-08-02

## 2018-05-29 NOTE — PROGRESS NOTES
"Subjective:       Patient ID: Jennifer Diaz is a 85 y.o. female.    Chief Complaint: Tremors (refills)    HPI     CHIEF COMPLAINT: insomnia .tremors.   HPI: much better with sleeping taking 1/4 of 0.5 mg klonopin. No falling.  Tremors better.     ONSET/TIMING: Onset       yrs  ago. Sudden: no .  Similar problems in past: no. ..    DURATION:  Intermittent    QUALITY/COURSE: .unchanged.     Can't fall asleep: yes. . Wakes up early: yes.      INTENSITY/SEVERITY:  Severity 5    (on a 1-10 scale).      MODIFIERS/TREATMENTS: . Taking medications: yes.  . Effective: yes.  SYMPTOMS/RELATED: . Possible medication side effects include:     .     The following symptoms/statements  are positive if BOLD, negative otherwise.      CONTEXT/WHEN:  .Nocturnal. . Napping.  shift work . Time_zone_changes.     REVIEW OF SYSTEMS :  .   nocturia . Restless_legs . pain . Obstructive_sleep_apnea  . depression . anxiety . Substance_abuse . Copd . Grief .     Patient states that she is fearful of walking without a walker.  She doesn't have a good walker at home.  She is able to stand up with no problems.  Review of Systems      Objective:      Vitals:    05/29/18 1554   BP: 100/66   Pulse: 68   Resp: 16   Temp: 98.1 °F (36.7 °C)   TempSrc: Oral   SpO2: (!) 93%   Weight: 53.6 kg (118 lb 2.7 oz)   Height: 5' 2" (1.575 m)   PainSc:   8   PainLoc: Shoulder     Physical Exam   Constitutional: She appears well-developed and well-nourished.   Cardiovascular: Normal rate, regular rhythm and normal heart sounds.    Pulmonary/Chest: Effort normal and breath sounds normal.   Abdominal: Soft. There is no tenderness.   Musculoskeletal:   Very hesitant gait.  Walks inches at a step and doesn't lift her feet up area   Neurological: She is alert.   Psychiatric: She has a normal mood and affect. Her behavior is normal. Thought content normal.   Nursing note and vitals reviewed.        Assessment:       No diagnosis found.      Plan:     There are no " diagnoses linked to this encounter.  No Follow-up on file.

## 2018-06-13 DIAGNOSIS — G47.00 INSOMNIA, UNSPECIFIED TYPE: ICD-10-CM

## 2018-06-13 DIAGNOSIS — R25.1 TREMORS OF NERVOUS SYSTEM: ICD-10-CM

## 2018-06-13 RX ORDER — ALBUTEROL SULFATE 90 UG/1
2 AEROSOL, METERED RESPIRATORY (INHALATION) EVERY 6 HOURS PRN
Qty: 18 G | Refills: 5 | Status: SHIPPED | OUTPATIENT
Start: 2018-06-13 | End: 2019-08-05 | Stop reason: CLARIF

## 2018-06-13 RX ORDER — CLONAZEPAM 0.5 MG/1
TABLET ORAL
Qty: 10 TABLET | Refills: 2 | OUTPATIENT
Start: 2018-06-13

## 2018-06-13 NOTE — TELEPHONE ENCOUNTER
----- Message from Yumiko Tripathi sent at 6/13/2018  1:25 PM CDT -----   Type:  RX Refill Request    Who Called:  pt  Refill or New Rx: elonazeam 5  Mg / veneolin  In haler  RX Name and Strength:  5 mg  How is the patient currently taking it? (ex. 1XDay): mary  Is this a 30 day or 90 day RX:  mary  Preferred Pharmacy with phone number:    [facility] [address] [place] [address] & [address] [address]  Phone: [phone] Fax: [phone]    Local or Mail Order: local  Ordering Provider:  mary  Best Call Back Number: [phone]  Additional Information:  mary

## 2018-06-18 ENCOUNTER — TELEPHONE (OUTPATIENT)
Dept: FAMILY MEDICINE | Facility: CLINIC | Age: 83
End: 2018-06-18

## 2018-06-18 DIAGNOSIS — J45.909 INTRINSIC ASTHMA: Primary | ICD-10-CM

## 2018-06-18 RX ORDER — ALBUTEROL SULFATE 90 UG/1
2 AEROSOL, METERED RESPIRATORY (INHALATION) EVERY 6 HOURS PRN
Qty: 1 EACH | Refills: 11 | Status: SHIPPED | OUTPATIENT
Start: 2018-06-18 | End: 2018-07-02 | Stop reason: SDUPTHER

## 2018-07-02 DIAGNOSIS — J45.909 INTRINSIC ASTHMA: ICD-10-CM

## 2018-07-02 DIAGNOSIS — J45.20 MILD INTERMITTENT INTRINSIC ASTHMA WITHOUT COMPLICATION: ICD-10-CM

## 2018-07-02 RX ORDER — BUDESONIDE AND FORMOTEROL FUMARATE DIHYDRATE 80; 4.5 UG/1; UG/1
AEROSOL RESPIRATORY (INHALATION)
Qty: 10.2 G | Refills: 0 | Status: SHIPPED | OUTPATIENT
Start: 2018-07-02 | End: 2019-08-05 | Stop reason: SDUPTHER

## 2018-07-02 RX ORDER — ALBUTEROL SULFATE 90 UG/1
2 AEROSOL, METERED RESPIRATORY (INHALATION) EVERY 6 HOURS PRN
Qty: 1 EACH | Refills: 0 | Status: SHIPPED | OUTPATIENT
Start: 2018-07-02 | End: 2018-08-02

## 2018-07-02 NOTE — TELEPHONE ENCOUNTER
----- Message from Nevin Garcia sent at 7/2/2018 12:13 PM CDT -----  Contact: Patient  Patient would like to speak with Alexa regarding her medication refill.  SYMBICORT 80-4.5 mcg/actuation HFAA  Ventolin  Call Back#[phone]  Thanks     [facility] [address] [place] [address] & [address] [address]  Phone: [phone] Fax: [phone]

## 2018-08-02 ENCOUNTER — DOCUMENTATION ONLY (OUTPATIENT)
Dept: FAMILY MEDICINE | Facility: CLINIC | Age: 83
End: 2018-08-02

## 2018-08-02 ENCOUNTER — HOSPITAL ENCOUNTER (OUTPATIENT)
Dept: RADIOLOGY | Facility: CLINIC | Age: 83
Discharge: HOME | End: 2018-08-02
Attending: NURSE PRACTITIONER
Payer: MEDICARE

## 2018-08-02 ENCOUNTER — OFFICE VISIT (OUTPATIENT)
Dept: FAMILY MEDICINE | Facility: CLINIC | Age: 83
End: 2018-08-02
Payer: MEDICARE

## 2018-08-02 VITALS
SYSTOLIC BLOOD PRESSURE: 122 MMHG | DIASTOLIC BLOOD PRESSURE: 60 MMHG | BODY MASS INDEX: 21.51 KG/M2 | OXYGEN SATURATION: 95 % | WEIGHT: 116.88 LBS | HEART RATE: 63 BPM | TEMPERATURE: 98 F | HEIGHT: 62 IN | RESPIRATION RATE: 18 BRPM

## 2018-08-02 DIAGNOSIS — R05.9 COUGH: ICD-10-CM

## 2018-08-02 DIAGNOSIS — R73.9 HYPERGLYCEMIA: ICD-10-CM

## 2018-08-02 DIAGNOSIS — R63.4 ABNORMAL WEIGHT LOSS: ICD-10-CM

## 2018-08-02 DIAGNOSIS — Z13.220 ENCOUNTER FOR LIPID SCREENING FOR CARDIOVASCULAR DISEASE: ICD-10-CM

## 2018-08-02 DIAGNOSIS — Z13.6 ENCOUNTER FOR LIPID SCREENING FOR CARDIOVASCULAR DISEASE: ICD-10-CM

## 2018-08-02 DIAGNOSIS — R05.9 COUGH: Primary | ICD-10-CM

## 2018-08-02 DIAGNOSIS — G25.0 ESSENTIAL TREMOR: ICD-10-CM

## 2018-08-02 DIAGNOSIS — J45.901 EXACERBATION OF ASTHMA, UNSPECIFIED ASTHMA SEVERITY, UNSPECIFIED WHETHER PERSISTENT: ICD-10-CM

## 2018-08-02 PROBLEM — K21.9 GASTROESOPHAGEAL REFLUX DISEASE: Status: ACTIVE | Noted: 2018-08-02

## 2018-08-02 PROCEDURE — 71046 X-RAY EXAM CHEST 2 VIEWS: CPT | Mod: 26,,, | Performed by: RADIOLOGY

## 2018-08-02 PROCEDURE — 71046 X-RAY EXAM CHEST 2 VIEWS: CPT | Mod: TC,FY,PO

## 2018-08-02 PROCEDURE — 99213 OFFICE O/P EST LOW 20 MIN: CPT | Mod: S$GLB,,, | Performed by: NURSE PRACTITIONER

## 2018-08-02 RX ORDER — DOXYCYCLINE HYCLATE 100 MG
100 TABLET ORAL 2 TIMES DAILY
Qty: 20 TABLET | Refills: 0 | Status: SHIPPED | OUTPATIENT
Start: 2018-08-02 | End: 2018-08-12

## 2018-08-02 RX ORDER — PRIMIDONE 50 MG/1
50 TABLET ORAL NIGHTLY
Qty: 30 TABLET | Refills: 2 | Status: SHIPPED | OUTPATIENT
Start: 2018-08-02 | End: 2019-08-05

## 2018-08-02 NOTE — PROGRESS NOTES
Subjective:       Patient ID: Jennifer Diaz is a 85 y.o. female.    Chief Complaint: Asthma and Nasal Congestion    Asthma   She complains of cough, hoarse voice, shortness of breath and sputum production. Primary symptoms comments: Was very dyspneic when exacerbation first started, but breathing is better now.   a. This is a recurrent problem. The current episode started more than 1 month ago. The problem has been gradually improving. The cough is productive of brown sputum. Associated symptoms include nasal congestion, sneezing and weight loss (but states her weight loss is from change in diet. ). Pertinent negatives include no chest pain, dyspnea on exertion, ear pain, fever or malaise/fatigue. Her symptoms are alleviated by OTC cough suppressant, ipratropium and beta-agonist (and vicks vaporub and ocean spray saline nasal spray. Only using nebulizer once a day.). She reports minimal improvement on treatment. Her past medical history is significant for asthma.     Weight loss, states it is from changing her diet, but has lost almost 50 pounds in a year and a half.   Review of Systems   Constitutional: Positive for weight loss (but states her weight loss is from change in diet. ). Negative for fever and malaise/fatigue.   HENT: Positive for hoarse voice and sneezing. Negative for ear pain.    Respiratory: Positive for cough, sputum production and shortness of breath.    Cardiovascular: Negative for chest pain and dyspnea on exertion.       Objective:      Physical Exam   Constitutional: She is oriented to person, place, and time. She appears well-developed and well-nourished. No distress.   HENT:   Head: Normocephalic and atraumatic.   Eyes: Conjunctivae are normal. Right eye exhibits no discharge. Left eye exhibits no discharge. No scleral icterus.   Cardiovascular: Normal rate, regular rhythm and normal heart sounds.  Exam reveals no gallop and no friction rub.    No murmur heard.  Pulmonary/Chest:  Effort normal. No respiratory distress. She has no wheezes. She has rales.   Breath sounds very diminished, worse lower lobes bilaterally.    Neurological: She is alert and oriented to person, place, and time.   Skin: Skin is warm and dry. She is not diaphoretic.   Psychiatric: She has a normal mood and affect. Her behavior is normal.   Nursing note and vitals reviewed.      Assessment:       1. Cough    2. Exacerbation of asthma, unspecified asthma severity, unspecified whether persistent    3. Abnormal weight loss    4. Hyperglycemia    5. Encounter for lipid screening for cardiovascular disease        Plan:       Cough  -     X-Ray Chest PA And Lateral; Future; Expected date: 08/02/2018    Exacerbation of asthma, unspecified asthma severity, unspecified whether persistent  -     doxycycline (VIBRA-TABS) 100 MG tablet; Take 1 tablet (100 mg total) by mouth 2 (two) times daily. for 10 days  Dispense: 20 tablet; Refill: 0    Abnormal weight loss  -     Comprehensive metabolic panel; Future; Expected date: 08/02/2018  -     CBC auto differential; Future; Expected date: 08/02/2018    Hyperglycemia  -     Hemoglobin A1c; Future; Expected date: 08/02/2018    Encounter for lipid screening for cardiovascular disease  -     Lipid panel; Future; Expected date: 08/02/2018         Do not start the doxycycline until we call you with the ok.     Follow up with Dr. Mart in 1 month

## 2018-08-02 NOTE — PROGRESS NOTES
Health Maintenance Due   Topic Date Due    Lipid Panel  09/07/1932    TETANUS VACCINE  09/07/1950    DEXA SCAN  09/07/1972    Zoster Vaccine  09/07/1992    Pneumococcal (65+) (2 of 2 - PPSV23) 03/15/2017    Influenza Vaccine  08/01/2018

## 2018-08-02 NOTE — PATIENT INSTRUCTIONS
Do not start the doxycycline until we call you with the ok. Stop propranolol. Start primidone once a day for tremors.

## 2018-08-03 ENCOUNTER — TELEPHONE (OUTPATIENT)
Dept: FAMILY MEDICINE | Facility: CLINIC | Age: 83
End: 2018-08-03

## 2018-08-03 NOTE — TELEPHONE ENCOUNTER
----- Message from Thaddeus Chávez sent at 8/3/2018 12:47 PM CDT -----  Contact: same  Patient called in and stated she has been trying to get her Rx for Propranolol 20 mg refilled.  Patient stated the pharmacy has called also??  Patient saw Tash yesterday 8/2/18 and she stated there must be some misunderstanding about this medication???      [facility] [address] [place] [address] & [address] [address]  Phone: [phone] Fax: [phone]    Patient call back number is [phone]

## 2018-08-03 NOTE — TELEPHONE ENCOUNTER
Inderal is probably not a good idea as she has lung disease and will make it worse.  We had discussed with Tash to put her on primidone instead.

## 2018-08-03 NOTE — TELEPHONE ENCOUNTER
----- Message from London Thrasher sent at 8/3/2018 11:39 AM CDT -----  Contact: self   Patient called to check status of rx, she is having tremors. Please call back at [phone] (home)

## 2018-08-03 NOTE — TELEPHONE ENCOUNTER
----- Message from Amie Murcia sent at 8/3/2018  9:12 AM CDT -----  Contact: self  Patient calling to speak to nurse regarding a refill,patient gave medication number of   # [id number]    Patient stating she need to nurse regarding error on stopped medications.patient requesting to speak with Alexa.    Please call to advice [phone]      [facility] [address] [place] [address] & [address] [address]  Phone: [phone] Fax: [phone]

## 2018-08-03 NOTE — TELEPHONE ENCOUNTER
Unable to identify medication by pharmacy number.  Call placed to pharmacy, request for Inderal 20mg.  Not on current medication list.  Fwd to provider.

## 2018-08-03 NOTE — TELEPHONE ENCOUNTER
----- Message from Rito Saenz sent at 8/3/2018  4:44 PM CDT -----  Contact: Pt  Pt states she needs an approval for the prescription to be filled.  Pt is out of the prescription and has been for 2 days.  The pharmacy has the prescription ready to fill.  Just awaiting on Dr Mart to call with approval.      primidone (MYSOLINE) 50 MG Tab    Call Back #:   Thanks        [facility] [address] [place] [address] & [address] [address]  Phone: [phone] Fax: [phone]

## 2018-08-06 ENCOUNTER — TELEPHONE (OUTPATIENT)
Dept: FAMILY MEDICINE | Facility: CLINIC | Age: 83
End: 2018-08-06

## 2018-08-06 NOTE — TELEPHONE ENCOUNTER
----- Message from Madyson Herzog sent at 8/6/2018 12:58 PM CDT -----  Contact: [phone]   Patient requesting a refill on propranolol 20 mg.    Waiting on doctor approval  Patient is completely out of medication.  Stated the medication help for her tremors and she's starting to shake.    Patient going to her dental appt today if no answer please leave a detail message.    Patient will be using   [facility] [address] [place] [address] & [address] [address]  Phone: [phone] Fax: [phone]    Please call patient at [phone].     Thanks!

## 2018-08-06 NOTE — TELEPHONE ENCOUNTER
Koki spoke to patient and patient is refusing doctor order to discontinue propanolol and start primidone.Patient spoke to management and I confirmed with management what was going on.Roni said I did not need to have Dr moreno contact patient.Patient has appointment with Dr. He.

## 2018-08-09 ENCOUNTER — TELEPHONE (OUTPATIENT)
Dept: FAMILY MEDICINE | Facility: CLINIC | Age: 83
End: 2018-08-09

## 2018-08-09 NOTE — TELEPHONE ENCOUNTER
----- Message from Alfredo Lassiter sent at 8/9/2018  4:16 PM CDT -----  Contact: Jennifer  Calling to see if she can get home health physical therapy. [phone] ([place])   thanks

## 2018-08-13 ENCOUNTER — TELEPHONE (OUTPATIENT)
Dept: FAMILY MEDICINE | Facility: CLINIC | Age: 83
End: 2018-08-13

## 2018-08-13 NOTE — TELEPHONE ENCOUNTER
----- Message from Vijay Szymanski sent at 8/13/2018  3:01 PM CDT -----  Type:  RX Refill Request    Who Called:  Patient  Refill or New Rx:  Refill  RX Name and Strength: Eroeranolol  How is the patient currently taking it? (ex. 1XDay):  2X Day  Is this a 30 day or 90 day RX:  90  Preferred Pharmacy with phone number:    [facility] [address] [place] [address] & [address] [address]  Phone: [phone] Fax: [phone]    Local or Mail Order:  Local  Ordering Provider:  Barron Day Call Back Number:  [phone]  Additional Information:  Patient states that she is leaving town in the morning and needs the prescription as soon as possible.

## 2018-08-13 NOTE — TELEPHONE ENCOUNTER
Spoke with patient.  She was in parking lot at time of call. Requesting refill on medication.  Instructed per provider that she needs appointment.  States she is going out of town.  Nothing available today.  First available is 08/20/18.  Requested emergency supply.  Instructed that per Dr Mart she needs to be seen.  Appointment scheduled.

## 2018-08-13 NOTE — TELEPHONE ENCOUNTER
----- Message from Dana Spring sent at 8/13/2018  2:57 PM CDT -----  Type:  Same Day Appointment Request    Caller is requesting a same day appointment.  Caller declined first available appointment listed below.      Name of Caller:Patient  When is the first available appointment?  8/20/18  Symptoms:  Med refill  Best Call Back Number:  [phone] (home)     Additional Information:   Stating leaving to go out of town tomorrow and would like to be seen today for med refills

## 2018-08-14 ENCOUNTER — TELEPHONE (OUTPATIENT)
Dept: FAMILY MEDICINE | Facility: CLINIC | Age: 83
End: 2018-08-14

## 2018-08-14 NOTE — TELEPHONE ENCOUNTER
----- Message from Amie Murcia sent at 8/14/2018 12:40 PM CDT -----  Contact: self  Patient need to speak to nurse regarding rescheduling appointment for 8/28 to the appointment on 8/20 patient want both appointments to merge on 8/20    Please call to advice [phone] (home)

## 2018-08-17 DIAGNOSIS — Z78.0 ASYMPTOMATIC MENOPAUSAL STATE: Primary | ICD-10-CM

## 2018-08-20 ENCOUNTER — TELEPHONE (OUTPATIENT)
Dept: FAMILY MEDICINE | Facility: CLINIC | Age: 83
End: 2018-08-20

## 2018-08-20 ENCOUNTER — OFFICE VISIT (OUTPATIENT)
Dept: FAMILY MEDICINE | Facility: CLINIC | Age: 83
End: 2018-08-20
Payer: MEDICARE

## 2018-08-20 VITALS
WEIGHT: 116.63 LBS | OXYGEN SATURATION: 95 % | HEART RATE: 68 BPM | HEIGHT: 62 IN | BODY MASS INDEX: 21.46 KG/M2 | TEMPERATURE: 98 F | DIASTOLIC BLOOD PRESSURE: 62 MMHG | RESPIRATION RATE: 16 BRPM | SYSTOLIC BLOOD PRESSURE: 118 MMHG

## 2018-08-20 DIAGNOSIS — R48.2 GAIT APRAXIA OF ELDERLY: Primary | ICD-10-CM

## 2018-08-20 DIAGNOSIS — R26.9 GAIT DISTURBANCE: Primary | ICD-10-CM

## 2018-08-20 PROCEDURE — 99213 OFFICE O/P EST LOW 20 MIN: CPT | Mod: S$GLB,,, | Performed by: INTERNAL MEDICINE

## 2018-08-20 PROCEDURE — 3078F DIAST BP <80 MM HG: CPT | Mod: CPTII,S$GLB,, | Performed by: INTERNAL MEDICINE

## 2018-08-20 PROCEDURE — 3074F SYST BP LT 130 MM HG: CPT | Mod: CPTII,S$GLB,, | Performed by: INTERNAL MEDICINE

## 2018-08-20 RX ORDER — CLONAZEPAM 0.5 MG/1
TABLET ORAL
Qty: 7 TABLET | Refills: 2 | Status: SHIPPED | OUTPATIENT
Start: 2018-08-20 | End: 2018-11-20 | Stop reason: SDUPTHER

## 2018-08-20 RX ORDER — PROPRANOLOL HYDROCHLORIDE 20 MG/1
20 TABLET ORAL 2 TIMES DAILY
Qty: 180 TABLET | Refills: 3 | Status: SHIPPED | OUTPATIENT
Start: 2018-08-20 | End: 2018-11-20 | Stop reason: SDUPTHER

## 2018-08-20 RX ORDER — CLONAZEPAM 0.5 MG/1
0.5 TABLET ORAL 2 TIMES DAILY PRN
COMMUNITY
End: 2018-08-20 | Stop reason: SDUPTHER

## 2018-08-20 NOTE — PROGRESS NOTES
"Subjective:       Patient ID: Jennifer Diaz is a 85 y.o. female.    Chief Complaint: Tremors (refills)    HPI  Review of Systems      Objective:      Vitals:    08/20/18 1340   BP: 118/62   Pulse: 68   Resp: 16   Temp: 97.9 °F (36.6 °C)   SpO2: 95%   Weight: 52.9 kg (116 lb 10 oz)   Height: 5' 2" (1.575 m)   PainSc: 0-No pain     Physical Exam   Constitutional: She appears well-developed and well-nourished.   HENT:   Right Ear: External ear normal.   Left Ear: External ear normal.   Mouth/Throat: Oropharynx is clear and moist. No oropharyngeal exudate.   Cardiovascular: Normal rate, regular rhythm and normal heart sounds. Exam reveals no friction rub.   No murmur heard.  Pulmonary/Chest: Effort normal and breath sounds normal. No respiratory distress. She has no wheezes. She has no rales. She exhibits no tenderness.   Abdominal: Soft. Bowel sounds are normal. There is no tenderness.   Musculoskeletal: She exhibits no edema.   Lymphadenopathy:     She has no cervical adenopathy.   Nursing note and vitals reviewed.        Assessment:       1. Gait apraxia of elderly          Plan:       Gait apraxia of elderly  -     Ambulatory referral to Home Health    Other orders  -     propranolol (INDERAL) 20 MG tablet; Take 1 tablet (20 mg total) by mouth 2 (two) times daily.  Dispense: 180 tablet; Refill: 3  -     clonazePAM (KLONOPIN) 0.5 MG tablet; 0.125 mg po qhs  Dispense: 7 tablet; Refill: 2      Follow-up in about 3 months (around 11/20/2018).      "

## 2018-08-21 ENCOUNTER — TELEPHONE (OUTPATIENT)
Dept: FAMILY MEDICINE | Facility: CLINIC | Age: 83
End: 2018-08-21

## 2018-08-21 NOTE — TELEPHONE ENCOUNTER
----- Message from Flora Saha sent at 8/21/2018  1:17 PM CDT -----  Contact: Jj from [facility]  Unfortunately [facility] is unable to take Ms. Diaz insurance for Home Health.  Jj states for [facility], a referral and the order needs to be sent in to [facility] and they will assign an agency.    Thank you

## 2018-08-22 ENCOUNTER — TELEPHONE (OUTPATIENT)
Dept: FAMILY MEDICINE | Facility: CLINIC | Age: 83
End: 2018-08-22

## 2018-08-22 NOTE — TELEPHONE ENCOUNTER
Faxed orders for home health and physical therapy through home health to [facility] with notes.

## 2018-08-28 ENCOUNTER — TELEPHONE (OUTPATIENT)
Dept: FAMILY MEDICINE | Facility: CLINIC | Age: 83
End: 2018-08-28

## 2018-08-28 NOTE — TELEPHONE ENCOUNTER
----- Message from Fernanda Larsen sent at 8/28/2018  8:36 AM CDT -----  Contact: Lindsey with [facility] [phone]  Lindsey with [facility] [phone] calling to speak with Alexa regarding the patients order for home health, [facility] is not in network so they need it changed to a home health agency in network. Also, she needs to clarify the patients home bound status. Please advise. Thanks.

## 2018-08-28 NOTE — TELEPHONE ENCOUNTER
Spoke with [facility].  Patient does not meet home bound criteria.  Order was canceled.  Sent to provider for OP orders.

## 2018-09-17 ENCOUNTER — DOCUMENTATION ONLY (OUTPATIENT)
Dept: FAMILY MEDICINE | Facility: CLINIC | Age: 83
End: 2018-09-17

## 2018-09-17 ENCOUNTER — OFFICE VISIT (OUTPATIENT)
Dept: FAMILY MEDICINE | Facility: CLINIC | Age: 83
End: 2018-09-17
Payer: MEDICARE

## 2018-09-17 VITALS
HEART RATE: 68 BPM | DIASTOLIC BLOOD PRESSURE: 68 MMHG | RESPIRATION RATE: 16 BRPM | SYSTOLIC BLOOD PRESSURE: 100 MMHG | BODY MASS INDEX: 21.22 KG/M2 | OXYGEN SATURATION: 94 % | WEIGHT: 115.31 LBS | TEMPERATURE: 98 F | HEIGHT: 62 IN

## 2018-09-17 DIAGNOSIS — M67.911 DISORDER OF ROTATOR CUFF, RIGHT: ICD-10-CM

## 2018-09-17 DIAGNOSIS — J30.89 NON-SEASONAL ALLERGIC RHINITIS, UNSPECIFIED TRIGGER: Primary | ICD-10-CM

## 2018-09-17 DIAGNOSIS — J30.89 NON-SEASONAL ALLERGIC RHINITIS, UNSPECIFIED TRIGGER: ICD-10-CM

## 2018-09-17 DIAGNOSIS — Z23 NEEDS FLU SHOT: ICD-10-CM

## 2018-09-17 DIAGNOSIS — R73.9 HYPERGLYCEMIA: ICD-10-CM

## 2018-09-17 PROCEDURE — 99213 OFFICE O/P EST LOW 20 MIN: CPT | Mod: S$GLB,,, | Performed by: INTERNAL MEDICINE

## 2018-09-17 PROCEDURE — G0008 ADMIN INFLUENZA VIRUS VAC: HCPCS | Mod: S$GLB,,, | Performed by: INTERNAL MEDICINE

## 2018-09-17 PROCEDURE — 1101F PT FALLS ASSESS-DOCD LE1/YR: CPT | Mod: CPTII,S$GLB,, | Performed by: INTERNAL MEDICINE

## 2018-09-17 PROCEDURE — 90662 IIV NO PRSV INCREASED AG IM: CPT | Mod: S$GLB,,, | Performed by: INTERNAL MEDICINE

## 2018-09-17 RX ORDER — MOMETASONE FUROATE 50 UG/1
2 SPRAY, METERED NASAL DAILY
Qty: 17 G | Refills: 5 | Status: SHIPPED | OUTPATIENT
Start: 2018-09-17 | End: 2018-11-20 | Stop reason: SDUPTHER

## 2018-09-17 RX ORDER — IPRATROPIUM BROMIDE 42 UG/1
2 SPRAY, METERED NASAL 4 TIMES DAILY
Qty: 15 ML | Refills: 0 | Status: SHIPPED | OUTPATIENT
Start: 2018-09-17 | End: 2018-09-17 | Stop reason: SDUPTHER

## 2018-09-17 RX ORDER — IPRATROPIUM BROMIDE 42 UG/1
SPRAY, METERED NASAL
Qty: 135 ML | Refills: 0 | Status: SHIPPED | OUTPATIENT
Start: 2018-09-17 | End: 2018-11-20 | Stop reason: SDUPTHER

## 2018-09-17 NOTE — PROGRESS NOTES
Subjective:       Patient ID: Jennifer Diaz is a 86 y.o. female.    Chief Complaint: Shoulder Pain (discuss getting physical therapy) and Nasal Congestion    HPI     CHIEF COMPLAINT: Sinusitis(+).  HPI:     ONSET/TIMING:  Onset      yrs  ago. Similar problems in past: no. .   DURATION:   continuous    QUALITY/COURSE:    worse    LOCATION:  Facial/Sinus pain: bilateral. .     INTENSITY/SEVERITY: # 5 / 10 (on 1 to 10 scale)     The following symptoms/statements  are positive if BOLD, negative otherwise.      CONTEXT/WHEN: . Similar_problems.. Seasonal_pattern. Allergies/Hayfever.  Tobacco_use. .  Irritant_Exposure(smoke/dust/fumes). . Exposure_to_others_with_similar_symptoms. .               MODIFIERS/TREATMENTS: Antihistamines: . Rx-Nasal_Spray: . OTC Nasal Spray Use. Decongestants. . Antibiotics. -.      SYMPTOMS/RELATED:  Sinus_pain. .Rhinorrhea/Purulent. .    Dentalgia. Lymphadenopathy.  Swelling-Neck.           CHIEF COMPLAINT: Upper_Extremity_Problems. Pain: yes.. Weakness: no . Injury: no .  HPI:also has neck pain.     ONSET/TIMING: . Onset 180 d  ago.  Gradual. Work related: no.  Similar_problems_in past: no.     DURATION: .          Paroxysmal: no .    QUALITY/COURSE:  unchanged     LOCATION:    r shoulder  . Radiation: no.    INTENSITY/SEVERITY:. Severity is # 8   (10 point scale).    CONTEXT/WHEN: . Date_Expected_Work_Return is . Activity: yes. . Abduction greater than 90 degrees: no.. Inactivity: no      MODIFIERS/TREATMENTS:  Current_Limitations_are.        Taking medications: no.  Physical_Therapy: no .  Chiropractor: no . . Litigation_pending: no. . X-rays: no.. CT: no.. MRI: no.    SYMPTOMS/RELATED: . --Possible medication side effects include:.     The following symptoms are positive if BOLD, negative otherwise.       REVIEW OF SYMPTOMS:   Numbness. Weakness. Muscle_Problems. Fever. Joint_Problems. Swelling. Erythema. Weight_loss.    Review of Systems   Constitutional: Negative for chills  "and fever.   HENT: Positive for postnasal drip and rhinorrhea. Negative for nosebleeds, sinus pressure, sneezing and sore throat.    Eyes: Negative for itching.   Musculoskeletal: Negative for neck pain.         Objective:      Vitals:    09/17/18 1507   BP: 100/68   Pulse: 68   Resp: 16   Temp: 97.9 °F (36.6 °C)   TempSrc: Oral   SpO2: (!) 94%   Weight: 52.3 kg (115 lb 4.8 oz)   Height: 5' 2" (1.575 m)   PainSc: 0-No pain     Physical Exam   Constitutional: She appears well-developed and well-nourished.   HENT:   Right Ear: External ear normal.   Left Ear: External ear normal.   Mouth/Throat: Oropharynx is clear and moist. No oropharyngeal exudate.   Congestive   Cardiovascular: Normal rate, regular rhythm and normal heart sounds. Exam reveals no friction rub.   No murmur heard.  Pulmonary/Chest: Effort normal and breath sounds normal. No respiratory distress. She has no wheezes. She has no rales. She exhibits no tenderness.   Abdominal: Soft. Bowel sounds are normal. There is no tenderness.   Musculoskeletal: She exhibits no edema.   No tenderness. Abduction passively to about 70°.  Patient has extreme weakness with abduction.  Avery sign positive.   Lymphadenopathy:     She has no cervical adenopathy.   Neurological: She is alert.   Psychiatric: She has a normal mood and affect. Her behavior is normal. Thought content normal.   Nursing note and vitals reviewed.        Assessment:       1. Non-seasonal allergic rhinitis, unspecified trigger    2. Disorder of rotator cuff, right    3. Needs flu shot    4. Hyperglycemia          Plan:       Non-seasonal allergic rhinitis, unspecified trigger  -     mometasone (NASONEX) 50 mcg/actuation nasal spray; 2 sprays by Nasal route once daily.  Dispense: 17 g; Refill: 5  -     ipratropium (ATROVENT) 42 mcg (0.06 %) nasal spray; 2 sprays by Nasal route 4 (four) times daily.  Dispense: 15 mL; Refill: 0    Disorder of rotator cuff, right  -     Ambulatory consult to " Orthopedics  -     Ambulatory consult to Physical Therapy    Needs flu shot  -     Influenza - High Dose (65+) (PF) (IM)    Hyperglycemia  -     Comprehensive metabolic panel; Future; Expected date: 09/17/2018      Follow-up in about 2 months (around 11/17/2018).

## 2018-09-17 NOTE — PROGRESS NOTES
Health Maintenance Due   Topic Date Due    Lipid Panel  09/07/1932    TETANUS VACCINE  09/07/1950    Zoster Vaccine  09/07/1992    Pneumococcal (65+) (2 of 2 - PPSV23) 03/15/2017    Influenza Vaccine  08/01/2018

## 2018-09-21 DIAGNOSIS — M25.511 RIGHT SHOULDER PAIN, UNSPECIFIED CHRONICITY: Primary | ICD-10-CM

## 2018-10-25 ENCOUNTER — TELEPHONE (OUTPATIENT)
Dept: FAMILY MEDICINE | Facility: CLINIC | Age: 83
End: 2018-10-25

## 2018-10-25 DIAGNOSIS — J45.20 MILD INTERMITTENT INTRINSIC ASTHMA WITHOUT COMPLICATION: ICD-10-CM

## 2018-10-25 NOTE — TELEPHONE ENCOUNTER
----- Message from Lesa Jain sent at 10/25/2018  3:36 PM CDT -----  Contact: Pt  Name of Who is Calling: Jennifer      What is the request in detail: Patient is calling requesting to speak with a nurse in regards to knowing why her medication is pending.      Can the clinic reply by [facility]:      What Number to Call Back if not in [facility]: .[phone] (home)

## 2018-10-26 RX ORDER — BUDESONIDE AND FORMOTEROL FUMARATE DIHYDRATE 80; 4.5 UG/1; UG/1
AEROSOL RESPIRATORY (INHALATION)
Qty: 10.2 G | Refills: 0 | Status: SHIPPED | OUTPATIENT
Start: 2018-10-26 | End: 2019-08-05 | Stop reason: SDUPTHER

## 2018-10-31 ENCOUNTER — TELEPHONE (OUTPATIENT)
Dept: FAMILY MEDICINE | Facility: CLINIC | Age: 83
End: 2018-10-31

## 2018-10-31 NOTE — TELEPHONE ENCOUNTER
----- Message from Jess Zelaya sent at 10/31/2018  1:57 PM CDT -----  Type: Needs to reschedule appointment    Who Called:  Patient  Best Call Back Number: [phone]  Additional Information: Patient is requesting to reschedule appointment on November 20th due to will be out of town/ would like to be seen on November 8th around 2:00 if possible/no appointments showing available until December/please call patient back to reschedule or advise.

## 2018-11-06 ENCOUNTER — PATIENT OUTREACH (OUTPATIENT)
Dept: ADMINISTRATIVE | Facility: HOSPITAL | Age: 83
End: 2018-11-06

## 2018-11-06 DIAGNOSIS — Z00.00 ROUTINE ADULT HEALTH MAINTENANCE: Primary | ICD-10-CM

## 2018-11-06 NOTE — LETTER
November 14, 2018    Jennifer Veras Luts  [address]  [place] [address]             [facility] [address]  [place] [address]  Phone: [phone]

## 2018-11-08 RX ORDER — IPRATROPIUM BROMIDE AND ALBUTEROL SULFATE 2.5; .5 MG/3ML; MG/3ML
SOLUTION RESPIRATORY (INHALATION)
Qty: 3420 ML | Refills: 5 | Status: SHIPPED | OUTPATIENT
Start: 2018-11-08 | End: 2020-04-02 | Stop reason: SDUPTHER

## 2018-11-08 NOTE — TELEPHONE ENCOUNTER
"----- Message from Lesa Jian sent at 11/7/2018 12:01 PM CST -----  Contact: Pt  Name of Who is Calling: Jovan      What is the request in detail: Patient is calling requesting to speak with a nurse      Can the clinic reply by [facility]:       [facility]: .[phone] (home)       Pt states she needs refill on ProAir inhaler.   Refilled 6/13/2018. Spoke with [facility]'s pharmacist (Tram) who states refill are on file however, patient has filled "three inhalers since 9/4/18. Her insurance is rejecting because this should only be used for rescue"    Patient states she "has very bad asthma at this time of year" and reports "use the ProAir every 6 hours, two puffs but I am still out".   She reports using her nebulizer "sometimes" but requested a refill.   Please advise.     "

## 2018-11-20 ENCOUNTER — TELEPHONE (OUTPATIENT)
Dept: FAMILY MEDICINE | Facility: CLINIC | Age: 83
End: 2018-11-20

## 2018-11-20 ENCOUNTER — OFFICE VISIT (OUTPATIENT)
Dept: FAMILY MEDICINE | Facility: CLINIC | Age: 83
End: 2018-11-20
Payer: MEDICARE

## 2018-11-20 VITALS
HEIGHT: 62 IN | OXYGEN SATURATION: 94 % | DIASTOLIC BLOOD PRESSURE: 84 MMHG | RESPIRATION RATE: 16 BRPM | HEART RATE: 65 BPM | WEIGHT: 116.88 LBS | SYSTOLIC BLOOD PRESSURE: 126 MMHG | BODY MASS INDEX: 21.51 KG/M2

## 2018-11-20 DIAGNOSIS — E78.5 HYPERLIPIDEMIA, UNSPECIFIED HYPERLIPIDEMIA TYPE: ICD-10-CM

## 2018-11-20 DIAGNOSIS — G25.2 INTENTION TREMOR: Primary | ICD-10-CM

## 2018-11-20 DIAGNOSIS — J30.89 NON-SEASONAL ALLERGIC RHINITIS, UNSPECIFIED TRIGGER: ICD-10-CM

## 2018-11-20 PROCEDURE — 1101F PT FALLS ASSESS-DOCD LE1/YR: CPT | Mod: CPTII,S$GLB,, | Performed by: INTERNAL MEDICINE

## 2018-11-20 PROCEDURE — 99213 OFFICE O/P EST LOW 20 MIN: CPT | Mod: S$GLB,,, | Performed by: INTERNAL MEDICINE

## 2018-11-20 RX ORDER — PROPRANOLOL HYDROCHLORIDE 20 MG/1
20 TABLET ORAL 2 TIMES DAILY
Qty: 180 TABLET | Refills: 3 | Status: SHIPPED | OUTPATIENT
Start: 2018-11-20 | End: 2019-05-15 | Stop reason: SDUPTHER

## 2018-11-20 RX ORDER — MOMETASONE FUROATE 50 UG/1
2 SPRAY, METERED NASAL DAILY
Qty: 17 G | Refills: 5 | Status: SHIPPED | OUTPATIENT
Start: 2018-11-20 | End: 2019-02-21 | Stop reason: SDUPTHER

## 2018-11-20 RX ORDER — IPRATROPIUM BROMIDE 42 UG/1
SPRAY, METERED NASAL
Qty: 135 ML | Refills: 0 | Status: SHIPPED | OUTPATIENT
Start: 2018-11-20 | End: 2018-11-20 | Stop reason: SDUPTHER

## 2018-11-20 RX ORDER — IPRATROPIUM BROMIDE 42 UG/1
SPRAY, METERED NASAL
Qty: 135 ML | Refills: 0 | Status: SHIPPED | OUTPATIENT
Start: 2018-11-20 | End: 2019-02-21 | Stop reason: SDUPTHER

## 2018-11-20 RX ORDER — CLONAZEPAM 0.5 MG/1
TABLET ORAL
Qty: 7 TABLET | Refills: 2 | Status: SHIPPED | OUTPATIENT
Start: 2018-11-20 | End: 2019-02-21 | Stop reason: SDUPTHER

## 2018-11-20 NOTE — PROGRESS NOTES
"Subjective:       Patient ID: Jennifer Diaz is a 86 y.o. female.    Chief Complaint: Tremors (refills) and nasal drip    HPI     CHIEF :COMPLAINT:  Tremors.   HPI:  ONSET:      2 y   ago.   DURATION: . intermittant  QUALITY/COURSE: .better  LOCATION: both  Hands. Left worse.   INTENSITY/SEVERITY: # 3/10 (on 1 to 10 scale)  CONTEXT/WHEN:          MODIFIERS/TREATMENTS: Taking Medications: ? .     Compliant with Taking Prescribed Medications: ? yes  . Prior X-Rays: no  Prior Labs: no    Movements, medications or activities that worsen the problem:       Movements, medications, or activities that ease the problem:      The below section is positive for the patient ONLY if BOLDED:    SYMPTOMS/RELATED: decreased mobility, walks in house only.     Nasal congestion for 6 mo .      Review of Systems   HENT: Positive for postnasal drip, rhinorrhea and sneezing.          Objective:      Vitals:    11/20/18 1521   BP: 126/84   Pulse: 65   Resp: 16   SpO2: (!) 94%   Weight: 53 kg (116 lb 13.5 oz)   Height: 5' 2" (1.575 m)   PainSc: 0-No pain     Physical Exam   Constitutional: She appears well-developed and well-nourished.   HENT:   Right Ear: External ear normal.   Left Ear: External ear normal.   Mouth/Throat: Oropharynx is clear and moist. No oropharyngeal exudate.   Pale boggy nasal mucosa   Cardiovascular: Normal rate, regular rhythm and normal heart sounds. Exam reveals no friction rub.   No murmur heard.  Pulmonary/Chest: Effort normal and breath sounds normal. No respiratory distress. She has no wheezes. She has no rales. She exhibits no tenderness.   Abdominal: Soft. Bowel sounds are normal. There is no tenderness.   Musculoskeletal: She exhibits no edema.   Lymphadenopathy:     She has no cervical adenopathy.   Neurological: She is alert.   Psychiatric: She has a normal mood and affect. Her behavior is normal. Thought content normal.   Nursing note and vitals reviewed.        Assessment:       1. Intention " tremor    2. Non-seasonal allergic rhinitis, unspecified trigger          Plan:       Intention tremor  -     propranolol (INDERAL) 20 MG tablet; Take 1 tablet (20 mg total) by mouth 2 (two) times daily.  Dispense: 180 tablet; Refill: 3  -     clonazePAM (KLONOPIN) 0.5 MG tablet; 0.125 mg po qhs  Dispense: 7 tablet; Refill: 2    Non-seasonal allergic rhinitis, unspecified trigger  -     ipratropium (ATROVENT) 42 mcg (0.06 %) nasal spray; USE 2 SPRAYS IN EACH NOSTRIL FOUR TIMES DAILY  Dispense: 135 mL; Refill: 0  -     mometasone (NASONEX) 50 mcg/actuation nasal spray; 2 sprays by Nasal route once daily.  Dispense: 17 g; Refill: 5      No Follow-up on file.

## 2018-11-20 NOTE — TELEPHONE ENCOUNTER
----- Message from Diogo Johnston sent at 11/20/2018  2:44 PM CST -----  Contact: patient  Type: Needs Medical Advice    Who Called:  patient  Symptoms (please be specific):    How long has patient had these symptoms:    Pharmacy name and phone #:    Best Call Back Number: [phone]  Additional Information: patient stated that she just arrive at the office and requesting [place] to come get it with the wheel chair

## 2019-01-03 DIAGNOSIS — J45.20 MILD INTERMITTENT INTRINSIC ASTHMA WITHOUT COMPLICATION: ICD-10-CM

## 2019-01-04 RX ORDER — BUDESONIDE AND FORMOTEROL FUMARATE DIHYDRATE 80; 4.5 UG/1; UG/1
AEROSOL RESPIRATORY (INHALATION)
Qty: 10.2 G | Refills: 0 | Status: SHIPPED | OUTPATIENT
Start: 2019-01-04 | End: 2019-05-15 | Stop reason: SDUPTHER

## 2019-02-05 ENCOUNTER — PATIENT OUTREACH (OUTPATIENT)
Dept: ADMINISTRATIVE | Facility: HOSPITAL | Age: 84
End: 2019-02-05

## 2019-02-05 NOTE — LETTER
February 13, 2019    Jennifer Diaz  [address] [place] [address] [address]  [place] [address]             [facility]  [address]  [place] [address]  Phone: [phone] Dear Jennifer Diaz       [facility] is committed to your overall health.  To help you get the most out of each of your visits, we will review your information to make sure you are up to date on all of your recommended tests and/or procedures.       As a new patient to Dr. CARMELO JOHN we may not have your complete medical records.  She has found that your chart shows you may be due for a:     LIPID PANEL     If you have had any of the above done at another facility, please bring the records or information with you so that your record at [facility] will be complete.       If you are currently taking medication, please bring it with you to your appointment for review.     Also, if you have any type of Advanced Directives, please bring them with you to your office visit so we may scan them into your chart.         Sierra Lee LPN Clinical Care Coordinator   [place] [facility] [address] [place] [address]   Phone [phone]   Fax [phone]

## 2019-02-15 ENCOUNTER — TELEPHONE (OUTPATIENT)
Dept: FAMILY MEDICINE | Facility: CLINIC | Age: 84
End: 2019-02-15

## 2019-02-15 NOTE — TELEPHONE ENCOUNTER
----- Message from Nevin Garcia sent at 2/15/2019  2:49 PM CST -----  Contact: Patient  Type:  RX Refill Request    Who Called:  Patient  Refill or New Rx:  Refill  RX Name and Strength:    clonazePAM (KLONOPIN) 0.5 MG tablet  How is the patient currently taking it? (ex. 1XDay):    Is this a 30 day or 90 day RX:    Preferred Pharmacy with phone number:    [facility] [address] [place] [address] & [address] [address]  Phone: [phone] Fax: [phone]  Local or Mail Order:  Local  Ordering Provider:  Barron Day Call Back Number:    Additional Information:

## 2019-02-18 ENCOUNTER — TELEPHONE (OUTPATIENT)
Dept: FAMILY MEDICINE | Facility: CLINIC | Age: 84
End: 2019-02-18

## 2019-02-18 NOTE — TELEPHONE ENCOUNTER
----- Message from Steven Nair sent at 2/18/2019  2:47 PM CST -----  Contact: pt  Type:  Sooner Apoointment Request    Caller is requesting a sooner appointment.  Caller declined first available appointment listed below.  Caller will not accept being placed on the waitlist and is requesting a message be sent to doctor.    Name of Caller:  pt  When is the first available appointment?  None showing  Symptoms:  Check up/ med check  Best Call Back Number:   Additional Information:  [phone]

## 2019-02-21 ENCOUNTER — OFFICE VISIT (OUTPATIENT)
Dept: FAMILY MEDICINE | Facility: CLINIC | Age: 84
End: 2019-02-21
Payer: MEDICARE

## 2019-02-21 ENCOUNTER — DOCUMENTATION ONLY (OUTPATIENT)
Dept: FAMILY MEDICINE | Facility: CLINIC | Age: 84
End: 2019-02-21

## 2019-02-21 VITALS
OXYGEN SATURATION: 94 % | HEART RATE: 57 BPM | SYSTOLIC BLOOD PRESSURE: 124 MMHG | RESPIRATION RATE: 16 BRPM | BODY MASS INDEX: 20.98 KG/M2 | TEMPERATURE: 98 F | HEIGHT: 62 IN | DIASTOLIC BLOOD PRESSURE: 80 MMHG | WEIGHT: 114 LBS

## 2019-02-21 DIAGNOSIS — G25.2 INTENTION TREMOR: ICD-10-CM

## 2019-02-21 DIAGNOSIS — J30.89 NON-SEASONAL ALLERGIC RHINITIS, UNSPECIFIED TRIGGER: ICD-10-CM

## 2019-02-21 DIAGNOSIS — J45.20 MILD INTERMITTENT INTRINSIC ASTHMA WITHOUT COMPLICATION: ICD-10-CM

## 2019-02-21 DIAGNOSIS — J06.9 UPPER RESPIRATORY TRACT INFECTION, UNSPECIFIED TYPE: Primary | ICD-10-CM

## 2019-02-21 PROCEDURE — 99213 PR OFFICE/OUTPT VISIT, EST, LEVL III, 20-29 MIN: ICD-10-PCS | Mod: S$GLB,,, | Performed by: INTERNAL MEDICINE

## 2019-02-21 PROCEDURE — 1101F PR PT FALLS ASSESS DOC 0-1 FALLS W/OUT INJ PAST YR: ICD-10-PCS | Mod: CPTII,S$GLB,, | Performed by: INTERNAL MEDICINE

## 2019-02-21 PROCEDURE — 99213 OFFICE O/P EST LOW 20 MIN: CPT | Mod: S$GLB,,, | Performed by: INTERNAL MEDICINE

## 2019-02-21 PROCEDURE — 1101F PT FALLS ASSESS-DOCD LE1/YR: CPT | Mod: CPTII,S$GLB,, | Performed by: INTERNAL MEDICINE

## 2019-02-21 RX ORDER — CLONAZEPAM 0.5 MG/1
TABLET ORAL
Qty: 6 TABLET | Refills: 2 | Status: SHIPPED | OUTPATIENT
Start: 2019-02-21 | End: 2019-05-15 | Stop reason: SDUPTHER

## 2019-02-21 RX ORDER — CETIRIZINE HYDROCHLORIDE 1 MG/ML
10 SOLUTION ORAL DAILY
Qty: 300 ML | Refills: 3 | Status: SHIPPED | OUTPATIENT
Start: 2019-02-21 | End: 2019-08-28 | Stop reason: CLARIF

## 2019-02-21 RX ORDER — BUDESONIDE AND FORMOTEROL FUMARATE DIHYDRATE 80; 4.5 UG/1; UG/1
AEROSOL RESPIRATORY (INHALATION)
Qty: 10.2 G | Refills: 0 | Status: SHIPPED | OUTPATIENT
Start: 2019-02-21 | End: 2019-08-05 | Stop reason: SDUPTHER

## 2019-02-21 RX ORDER — MOMETASONE FUROATE 50 UG/1
2 SPRAY, METERED NASAL DAILY
Qty: 17 G | Refills: 5 | Status: SHIPPED | OUTPATIENT
Start: 2019-02-21 | End: 2019-02-25

## 2019-02-21 RX ORDER — IPRATROPIUM BROMIDE 42 UG/1
SPRAY, METERED NASAL
Qty: 135 ML | Refills: 0 | Status: SHIPPED | OUTPATIENT
Start: 2019-02-21 | End: 2019-08-28 | Stop reason: CLARIF

## 2019-02-21 NOTE — PROGRESS NOTES
"Subjective:       Patient ID: Jennifer Diaz is a 86 y.o. female.    Chief Complaint: Cough and Nasal Congestion    HPI     Patient is out of her Klonopin.  She uses a a quarter pill of 0.5 mg tablet to go to sleep.  She managed to stretched out an extra 5 days.    CHIEF COMPLAINT: Cough(+).  HPI:     ONSET/TIMIN wks   ago    DURATION:               Paroxysmal: no .    QUALITY/COURSE:. unchanged    INTENSITY/SEVERITY: Severity is # 2 (10 point scale)      The following symptoms/statements are positive if BOLD, negative otherwise.      CONTEXT/WHEN:  Tobacco_use. Smokers_in_home. Seasonal_pattern. Allergies/Hayfever. Sinusitis. Irritant_Exposure(smoke/dust/fumes). Exposure_to_others_with_similar_symptoms.        Similar_problems_in_past.   PAST TREATMENT OR EVALUATION:   previous PPD. Recent_previous_chest_x-ray. Recent_antibiotics.  Associated Symptoms:     sputum production: scant. copious. Hemoptysis.  Medical History: Past_pulmonary_infections.  Cardiovascular_disease.chronic_lung_disease.  tuberculosis. Asthma. AIDS. Gastroesophageal_reflux_disease .      Review of Systems   Constitutional: Negative for chills, diaphoresis, fever and unexpected weight change.   HENT: Positive for postnasal drip. Negative for rhinorrhea, sinus pressure and sore throat.    Respiratory: Positive for cough. Negative for shortness of breath and wheezing.    Cardiovascular: Negative for chest pain.   Musculoskeletal: Negative for myalgias.         Objective:      Vitals:    19 1151   BP: 124/80   Pulse: (!) 57   Resp: 16   Temp: 98.2 °F (36.8 °C)   TempSrc: Oral   SpO2: (!) 94%   Weight: 51.7 kg (113 lb 15.7 oz)   Height: 5' 2" (1.575 m)   PainSc: 0-No pain     Physical Exam   Constitutional: She appears well-developed and well-nourished.   HENT:   Right Ear: External ear normal.   Left Ear: External ear normal.   Mouth/Throat: Oropharynx is clear and moist. No oropharyngeal exudate.   Cardiovascular: Normal rate, " regular rhythm and normal heart sounds. Exam reveals no friction rub.   No murmur heard.  Pulmonary/Chest: Effort normal and breath sounds normal. No respiratory distress. She has no wheezes. She has no rales. She exhibits no tenderness.   Abdominal: Soft. Bowel sounds are normal. There is no tenderness.   Musculoskeletal: She exhibits no edema.   Lymphadenopathy:     She has no cervical adenopathy.   Neurological: She is alert.   Psychiatric: She has a normal mood and affect. Her behavior is normal. Thought content normal.   Nursing note and vitals reviewed.   Aorta      Assessment:       1. Upper respiratory tract infection, unspecified type    2. Non-seasonal allergic rhinitis, unspecified trigger    3. Intention tremor          Plan:     Slowly weaning the Klonopin  Upper respiratory tract infection, unspecified type  -     ipratropium (ATROVENT) 42 mcg (0.06 %) nasal spray; Use 2 sprays in each nostril qid  Dispense: 135 mL; Refill: 0  -     mometasone (NASONEX) 50 mcg/actuation nasal spray; 2 sprays by Nasal route once daily.  Dispense: 17 g; Refill: 5  -     cetirizine (ZYRTEC) 1 mg/mL syrup; Take 10 mLs (10 mg total) by mouth once daily.  Dispense: 300 mL; Refill: 3    Non-seasonal allergic rhinitis, unspecified trigger    Intention tremor  -     clonazePAM (KLONOPIN) 0.5 MG tablet; 0.125 mg po qhs  Dispense: 6 tablet; Refill: 2      Follow-up in about 3 months (around 5/21/2019) for if you are not better return in 2 weeks.

## 2019-02-21 NOTE — PATIENT INSTRUCTIONS
Cool mist vaporizor.  Hot fluids. Hot tea with lemon and honey.   Per transport ambulance is in ER and will be here in a few minutes

## 2019-02-21 NOTE — PROGRESS NOTES
Health Maintenance Due   Topic Date Due    Lipid Panel  09/07/1932    TETANUS VACCINE  09/07/1950    Zoster Vaccine  09/07/1992    Pneumococcal Vaccine (65+ Low/Medium Risk) (2 of 2 - PPSV23) 03/15/2017

## 2019-02-25 ENCOUNTER — TELEPHONE (OUTPATIENT)
Dept: FAMILY MEDICINE | Facility: CLINIC | Age: 84
End: 2019-02-25

## 2019-02-25 DIAGNOSIS — J30.89 NON-SEASONAL ALLERGIC RHINITIS, UNSPECIFIED TRIGGER: Primary | ICD-10-CM

## 2019-02-25 RX ORDER — FLUTICASONE PROPIONATE 50 MCG
2 SPRAY, SUSPENSION (ML) NASAL DAILY
Qty: 16 G | Refills: 11 | Status: SHIPPED | OUTPATIENT
Start: 2019-02-25 | End: 2019-08-28 | Stop reason: CLARIF

## 2019-03-18 ENCOUNTER — TELEPHONE (OUTPATIENT)
Dept: FAMILY MEDICINE | Facility: CLINIC | Age: 84
End: 2019-03-18

## 2019-05-14 ENCOUNTER — TELEPHONE (OUTPATIENT)
Dept: FAMILY MEDICINE | Facility: CLINIC | Age: 84
End: 2019-05-14

## 2019-05-14 NOTE — TELEPHONE ENCOUNTER
----- Message from Bill Hillman sent at 5/14/2019  4:22 PM CDT -----  Type:  Patient Call Back    Who Called:  pt  Who Left Message for Patient: salvatore  Does the patient know what this is regarding?: returning the phone call  Best Call Back Number:  [phone]  Additional Information:  Please call pt and leave a detailed message if there is no answer.

## 2019-05-14 NOTE — TELEPHONE ENCOUNTER
lvm on patient voice mail that needs to be seen for refill on klonopin.  Scheduled for tomorrow.  Instructed to call back to confirm

## 2019-05-14 NOTE — TELEPHONE ENCOUNTER
----- Message from Rito Saenz sent at 5/14/2019 12:48 PM CDT -----  Contact: pt  Type:  RX Refill Request    Who Called:  pt  Refill or New Rx:  refill  RX Name and Strength:  clonazePAM (KLONOPIN) 0.5 MG tablet  How is the patient currently taking it? (ex. 1XDay):  .25 x day  Is this a 30 day or 90 day RX:  30  Preferred Pharmacy with phone number:    [facility] [address] [place] [address] & [address] [address]  Phone: [phone] Fax: [phone]    Local or Mail Order:    Ordering Provider:    Shay Call Back Number:  [phone]  Additional Information:  Pt states she is out and has been trying to get this filled since Friday morning     Name band;

## 2019-05-14 NOTE — TELEPHONE ENCOUNTER
----- Message from Thaddeus Chávez sent at 5/14/2019  2:57 PM CDT -----  Contact: same  Patient called in and wanted to check the status of her Rx for clonazePAM (KLONOPIN) 0.5 MG tablet, 0.125 mg po qhs, 6 tablets with 2 refills last filled on 2/21/2019.  Patient stated she is completely out of medication & cannot drive after dark.      [facility] [address] [place] [address] & [address] [address]  Phone: [phone] Fax: [phone]    Patient call back number is [phone]

## 2019-05-15 ENCOUNTER — OFFICE VISIT (OUTPATIENT)
Dept: FAMILY MEDICINE | Facility: CLINIC | Age: 84
End: 2019-05-15
Payer: MEDICARE

## 2019-05-15 ENCOUNTER — DOCUMENTATION ONLY (OUTPATIENT)
Dept: FAMILY MEDICINE | Facility: CLINIC | Age: 84
End: 2019-05-15

## 2019-05-15 VITALS
RESPIRATION RATE: 16 BRPM | SYSTOLIC BLOOD PRESSURE: 102 MMHG | WEIGHT: 112.19 LBS | HEIGHT: 62 IN | HEART RATE: 65 BPM | DIASTOLIC BLOOD PRESSURE: 62 MMHG | OXYGEN SATURATION: 92 % | BODY MASS INDEX: 20.65 KG/M2 | TEMPERATURE: 98 F

## 2019-05-15 DIAGNOSIS — G25.2 INTENTION TREMOR: ICD-10-CM

## 2019-05-15 DIAGNOSIS — Z00.00 HEALTH CARE MAINTENANCE: Primary | ICD-10-CM

## 2019-05-15 DIAGNOSIS — J45.20 MILD INTERMITTENT INTRINSIC ASTHMA WITHOUT COMPLICATION: ICD-10-CM

## 2019-05-15 PROCEDURE — 90732 PPSV23 VACC 2 YRS+ SUBQ/IM: CPT | Mod: S$GLB,,, | Performed by: INTERNAL MEDICINE

## 2019-05-15 PROCEDURE — 1101F PR PT FALLS ASSESS DOC 0-1 FALLS W/OUT INJ PAST YR: ICD-10-PCS | Mod: CPTII,S$GLB,, | Performed by: INTERNAL MEDICINE

## 2019-05-15 PROCEDURE — 99213 PR OFFICE/OUTPT VISIT, EST, LEVL III, 20-29 MIN: ICD-10-PCS | Mod: 25,S$GLB,, | Performed by: INTERNAL MEDICINE

## 2019-05-15 PROCEDURE — 90732 PNEUMOCOCCAL POLYSACCHARIDE VACCINE 23-VALENT =>2YO SQ IM: ICD-10-PCS | Mod: S$GLB,,, | Performed by: INTERNAL MEDICINE

## 2019-05-15 PROCEDURE — 99499 RISK ADDL DX/OHS AUDIT: ICD-10-PCS | Mod: S$GLB,,, | Performed by: INTERNAL MEDICINE

## 2019-05-15 PROCEDURE — G0009 PNEUMOCOCCAL POLYSACCHARIDE VACCINE 23-VALENT =>2YO SQ IM: ICD-10-PCS | Mod: S$GLB,,, | Performed by: INTERNAL MEDICINE

## 2019-05-15 PROCEDURE — 99213 OFFICE O/P EST LOW 20 MIN: CPT | Mod: 25,S$GLB,, | Performed by: INTERNAL MEDICINE

## 2019-05-15 PROCEDURE — 1101F PT FALLS ASSESS-DOCD LE1/YR: CPT | Mod: CPTII,S$GLB,, | Performed by: INTERNAL MEDICINE

## 2019-05-15 PROCEDURE — 99499 UNLISTED E&M SERVICE: CPT | Mod: S$GLB,,, | Performed by: INTERNAL MEDICINE

## 2019-05-15 PROCEDURE — G0009 ADMIN PNEUMOCOCCAL VACCINE: HCPCS | Mod: S$GLB,,, | Performed by: INTERNAL MEDICINE

## 2019-05-15 RX ORDER — CLONAZEPAM 0.5 MG/1
TABLET ORAL
Qty: 6 TABLET | Refills: 2 | Status: SHIPPED | OUTPATIENT
Start: 2019-05-15 | End: 2019-08-05 | Stop reason: SDUPTHER

## 2019-05-15 RX ORDER — PROPRANOLOL HYDROCHLORIDE 20 MG/1
20 TABLET ORAL 2 TIMES DAILY
Qty: 180 TABLET | Refills: 3 | Status: SHIPPED | OUTPATIENT
Start: 2019-05-15 | End: 2020-06-02

## 2019-05-15 RX ORDER — BUDESONIDE AND FORMOTEROL FUMARATE DIHYDRATE 80; 4.5 UG/1; UG/1
AEROSOL RESPIRATORY (INHALATION)
Qty: 10.2 G | Refills: 5 | Status: SHIPPED | OUTPATIENT
Start: 2019-05-15 | End: 2020-06-02 | Stop reason: SDUPTHER

## 2019-05-15 NOTE — PROGRESS NOTES
Subjective:       Patient ID: Jennifer Diaz is a 86 y.o. female.    Chief Complaint: Tremors (refills)    HPI         CHIEF COMPLAINT: Dyspnea    .   HPI:     ONSET/TIMING:            yrs     ago.      DURATION: . intermittant    QUALITY/COURSE:   unchanged      INTENSITY/SEVERITY:  2 (0 to 10)               MODIFIERS/TREATMENTS:Precipitating factors: exercise,  Weights:   Wt Readings from Last 1 Encounters:   05/15/19 1319 50.9 kg (112 lb 3.4 oz)     BNP    @LABRCNTIP(BNP,BNPTRIAGEBLO)@  D-dimer  No results found for: DDIMER      SYMPTOMS/RELATED: . --Possible medication side effects include:    The following symptoms/statements are positive if in BOLD, negative if not.          CONTEXT/WHEN:  Similar_problems . Tobacco_use. Seasonal_pattern.  Copd. CHF.   thomboembolic disease.  Allergies/Hayfever.. Sinusitis. . Asthma.  Exposure_to_others_with_similar_symptoms.      TREATMENTS:  OTC_Cough/Decongestants..  Rx_Cough/Decongestants. Bronchodilators.. Inhaled_Corticosteroids. Oral_Corticosteroids... .Antibiotics. Diuretics. Ace inhibitor or ARB. Beta-blocker.     REVIEW OF SYMPTOMS:    . Dyspnea on exertion. Paroxysmal_Nocturnal_Dyspnea.. Orthopnea...  Purulent_Sputum. . Hemoptysis.  Rhinorrhea/Purulent.   Stressed. Weight_loss. Weight_gain. Leg_Pain.     \        CHIEF :COMPLAINT:  Tremor  HPI:  On propranolol and low-dose Klonopin.  ONSET:        ago.   DURATION: . intermittant  QUALITY/COURSE: .unchanged   LOCATION:   INTENSITY/SEVERITY: # 2/10 with Klonopin and propanolol but 6/10 without (on 1 to 10 scale)  CONTEXT/WHEN:          MODIFIERS/TREATMENTS: Taking Medications: ? .     Compliant with Taking Prescribed Medications: ? yes  . Prior X-Rays: no  Prior Labs: no    Movements, medications or activities that worsen the problem:       Movements, medications, or activities that ease the problem:      The below section is positive for the patient ONLY if BOLDED:    SYMPTOMS/RELATED:         Review of  "Systems      Objective:      Vitals:    05/15/19 1319   BP: 102/62   Pulse: 65   Resp: 16   Temp: 98 °F (36.7 °C)   TempSrc: Oral   SpO2: (!) 92%   Weight: 50.9 kg (112 lb 3.4 oz)   Height: 5' 2" (1.575 m)   PainSc: 0-No pain     Physical Exam   Constitutional: She appears well-developed and well-nourished.   Cardiovascular: Normal rate, regular rhythm and normal heart sounds.   Pulmonary/Chest: Effort normal and breath sounds normal.   Abdominal: Soft. There is no tenderness.   Musculoskeletal:   Mild tremor   Neurological: She is alert.   Psychiatric: She has a normal mood and affect. Her behavior is normal. Thought content normal.   Nursing note and vitals reviewed.        Assessment:       1. Health care maintenance    2. 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  Dispense: 1 Device; Refill: 0    Intention tremor  -     propranolol (INDERAL) 20 MG tablet; Take 1 tablet (20 mg total) by mouth 2 (two) times daily.  Dispense: 180 tablet; Refill: 3  -     clonazePAM (KLONOPIN) 0.5 MG tablet; 0.125 mg po qhs  Dispense: 6 tablet; Refill: 2      Follow up in about 3 months (around 8/15/2019).      "

## 2019-05-16 ENCOUNTER — TELEPHONE (OUTPATIENT)
Dept: FAMILY MEDICINE | Facility: CLINIC | Age: 84
End: 2019-05-16

## 2019-05-16 NOTE — TELEPHONE ENCOUNTER
----- Message from Lauryn Edwards sent at 5/16/2019  1:32 PM CDT -----  Type: Needs Medical Advice    Who Called:  self  Symptoms (please be specific):   Requesting  new battery for her scooter   How long has patient had these symptoms: can  the order to send to medicare     Pharmacy name and phone #:     Best Call Back Number: [phone]   Additional Information: asking to get help with costs

## 2019-05-17 NOTE — TELEPHONE ENCOUNTER
Attempted to call patient on Home telephone number and there was no answer; VM was not set up; no message left.

## 2019-05-17 NOTE — TELEPHONE ENCOUNTER
Called the [phone] phone number and a woman picked up the phone and said that I had the wrong number.

## 2019-06-26 DIAGNOSIS — J44.9 CHRONIC OBSTRUCTIVE PULMONARY DISEASE, UNSPECIFIED COPD TYPE: ICD-10-CM

## 2019-06-26 RX ORDER — ALBUTEROL SULFATE 90 UG/1
2 AEROSOL, METERED RESPIRATORY (INHALATION) EVERY 6 HOURS PRN
Qty: 1 EACH | Refills: 11 | Status: SHIPPED | OUTPATIENT
Start: 2019-06-26 | End: 2020-12-23 | Stop reason: SDUPTHER

## 2019-06-26 NOTE — TELEPHONE ENCOUNTER
----- Message from London Thrasher sent at 6/26/2019 12:13 PM CDT -----  Contact: self   Type:  RX Refill Request    Who Called: patient   Refill or New Rx:  Refill   RX Name and Strength:  Ventolin albuterol sulfate   Preferred Pharmacy with phone number:    [facility] [address] [place] [address] & [address] [address]  Phone: [phone] Fax: [phone]  Local or Mail Order:  Local   Best Call Back Number:  [phone] (home)

## 2019-07-01 ENCOUNTER — TELEPHONE (OUTPATIENT)
Dept: FAMILY MEDICINE | Facility: CLINIC | Age: 84
End: 2019-07-01

## 2019-07-01 NOTE — TELEPHONE ENCOUNTER
----- Message from Kaila Foley sent at 7/1/2019  1:23 PM CDT -----  Contact: Jennifer pt  Type: Needs Medical Advice    Who Called:  Jennifer  Best Call Back Number: [phone]  Additional Information: Pls call Jennifer regarding a note for home health for her. She is requesting for it to be faxed to [facility] @ [phone]. Pls call pt if any questions.

## 2019-07-23 DIAGNOSIS — G25.2 INTENTION TREMOR: ICD-10-CM

## 2019-07-23 RX ORDER — CLONAZEPAM 0.5 MG/1
TABLET ORAL
Qty: 6 TABLET | Refills: 2 | OUTPATIENT
Start: 2019-07-23

## 2019-07-23 NOTE — TELEPHONE ENCOUNTER
----- Message from Shmuelmirtha Price sent at 7/23/2019 12:01 PM CDT -----  Contact: patient   Type:  RX Refill Request    Who Called:  Patient   Refill or New Rx:  Refill   RX Name and Strength:  clonazePAM (KLONOPIN) 0.5 MG tablet  Preferred Pharmacy with phone number:  [facility], [phone]  Local or Mail Order: local   Best Call Back Number: [phone] (home)

## 2019-08-02 ENCOUNTER — TELEPHONE (OUTPATIENT)
Dept: FAMILY MEDICINE | Facility: CLINIC | Age: 84
End: 2019-08-02

## 2019-08-02 NOTE — TELEPHONE ENCOUNTER
----- Message from Antonette Hart sent at 8/2/2019 12:40 PM CDT -----  Contact: self  Patient want to know if you can fit her in before August 6th patient is going out of town please call back at [phone] for medication refill

## 2019-08-05 ENCOUNTER — OFFICE VISIT (OUTPATIENT)
Dept: FAMILY MEDICINE | Facility: CLINIC | Age: 84
End: 2019-08-05
Payer: MEDICARE

## 2019-08-05 ENCOUNTER — DOCUMENTATION ONLY (OUTPATIENT)
Dept: FAMILY MEDICINE | Facility: CLINIC | Age: 84
End: 2019-08-05

## 2019-08-05 VITALS
HEIGHT: 62 IN | TEMPERATURE: 98 F | BODY MASS INDEX: 20.4 KG/M2 | OXYGEN SATURATION: 93 % | WEIGHT: 110.88 LBS | DIASTOLIC BLOOD PRESSURE: 64 MMHG | SYSTOLIC BLOOD PRESSURE: 118 MMHG | HEART RATE: 68 BPM | RESPIRATION RATE: 16 BRPM

## 2019-08-05 DIAGNOSIS — J45.20 MILD INTERMITTENT INTRINSIC ASTHMA WITHOUT COMPLICATION: ICD-10-CM

## 2019-08-05 DIAGNOSIS — K59.00 CONSTIPATION, UNSPECIFIED CONSTIPATION TYPE: ICD-10-CM

## 2019-08-05 DIAGNOSIS — R07.89 ATYPICAL CHEST PAIN: Primary | ICD-10-CM

## 2019-08-05 DIAGNOSIS — G25.2 INTENTION TREMOR: ICD-10-CM

## 2019-08-05 PROCEDURE — 1101F PT FALLS ASSESS-DOCD LE1/YR: CPT | Mod: CPTII,S$GLB,, | Performed by: INTERNAL MEDICINE

## 2019-08-05 PROCEDURE — 99214 OFFICE O/P EST MOD 30 MIN: CPT | Mod: S$GLB,,, | Performed by: INTERNAL MEDICINE

## 2019-08-05 PROCEDURE — 1101F PR PT FALLS ASSESS DOC 0-1 FALLS W/OUT INJ PAST YR: ICD-10-PCS | Mod: CPTII,S$GLB,, | Performed by: INTERNAL MEDICINE

## 2019-08-05 PROCEDURE — 93010 EKG 12-LEAD: ICD-10-PCS | Mod: S$GLB,,, | Performed by: INTERNAL MEDICINE

## 2019-08-05 PROCEDURE — 93005 EKG 12-LEAD: ICD-10-PCS | Mod: S$GLB,,, | Performed by: INTERNAL MEDICINE

## 2019-08-05 PROCEDURE — 93010 ELECTROCARDIOGRAM REPORT: CPT | Mod: S$GLB,,, | Performed by: INTERNAL MEDICINE

## 2019-08-05 PROCEDURE — 99214 PR OFFICE/OUTPT VISIT, EST, LEVL IV, 30-39 MIN: ICD-10-PCS | Mod: S$GLB,,, | Performed by: INTERNAL MEDICINE

## 2019-08-05 PROCEDURE — 93005 ELECTROCARDIOGRAM TRACING: CPT | Mod: S$GLB,,, | Performed by: INTERNAL MEDICINE

## 2019-08-05 RX ORDER — CLONAZEPAM 0.5 MG/1
TABLET ORAL
Qty: 6 TABLET | Refills: 2 | Status: SHIPPED | OUTPATIENT
Start: 2019-08-05 | End: 2019-10-23 | Stop reason: SDUPTHER

## 2019-08-05 RX ORDER — DOCUSATE SODIUM 100 MG/1
100 CAPSULE, LIQUID FILLED ORAL 2 TIMES DAILY
Qty: 60 CAPSULE | Refills: 11 | Status: SHIPPED | OUTPATIENT
Start: 2019-08-05 | End: 2019-08-15

## 2019-08-05 NOTE — PATIENT INSTRUCTIONS
When you get the spacer restart the Symbicort.  Let us know you have any more problems.      Increase fiber intake.  Bran cereal or bran muffins are good.      Please fill out the patient experience survey.

## 2019-08-05 NOTE — PROGRESS NOTES
Subjective:      3:05 PM     Patient ID: Jennifer Diaz is a 86 y.o. female.    Chief Complaint: Tremors (refills)    HPI         CHIEF COMPLAINT: CHEST PAIN    HPI:  5 days ago the patient stop Symbicort because of burning in her throat but does not have a spacer    ONSET/TIMING:   Months   ago patient unsure    DURATION:  1 min  QUALITY/COURSE:  unchanged.    SEVERITY: #    3 /10 ( on 1 to 10 scale)    PREVIOUS CARDIAC TESTING: : echocardiogram and electrocardiogram (ECG).    LOCATION:  substernal   Radiation -  none    All choices for next 4 sections below are positive to the patient ONLY if BOLDED, otherwise negative:    AGGRAVATING FACTORS: Swallowing, , Deep_Breathing, Coughing, Neck/Arm/Chest_Movement     RELIEVING FACTORS: Nitroglycerin, Resting, Change_of_Position, coughing    ASSOCIATED SYMPTOMS:  Hemoptysis,Tenderness,  Leg_Pain    RISK FACTORS: Diabetes, HTN, Hyperlipidemia, smoking,         The patient had extreme constipation for 2 days last week.  It is improved now that she is having soft stools and sometimes diarrhea with the.  Her rectum was very tender trying to past very hard stool initially.            CHIEF :COMPLAINT:  Tremor  HPI:  On propranolol and low-dose Klonopin.  ONSET:        ago.   DURATION: . intermittant  QUALITY/COURSE: .unchanged   LOCATION:   INTENSITY/SEVERITY: # 1/10 with Klonopin and propanolol but 6/10 without (on 1 to 10 scale)  CONTEXT/WHEN:            MODIFIERS/TREATMENTS: Taking Medications: ? .     Compliant with Taking Prescribed Medications: ? yes  . Prior X-Rays: no  Prior Labs: no     Movements, medications or activities that worsen the problem:        Movements, medications, or activities that ease the problem:       The below section is positive for the patient ONLY if BOLDED:     SYMPTOMS/RELATED:          Review of Systems      Objective:      Vitals:    08/05/19 1437   BP: 118/64   Pulse: 68   Resp: 16   Temp: 98.2 °F (36.8 °C)   TempSrc: Oral   SpO2:  "(!) 93%   Weight: 50.3 kg (110 lb 14.3 oz)   Height: 5' 2" (1.575 m)   PainSc: 0-No pain     Physical Exam   Constitutional: She appears well-developed and well-nourished.   Cardiovascular: Normal rate, regular rhythm and normal heart sounds.   Pulmonary/Chest: Effort normal and breath sounds normal. She exhibits no tenderness (T2 ribs anteriorly).   Abdominal: Soft. There is no tenderness.   Neurological: She is alert.   Psychiatric: She has a normal mood and affect. Her behavior is normal. Thought content normal.   Nursing note and vitals reviewed.  ekg low voltage. Without ischemic changes.       Assessment:       1. Atypical chest pain    2. 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 Intention tremor    4. Constipation, unspecified constipation type          Plan:   Patient refused rectal exam.  I advised her that she could be impacted as she is having some diarrhea around hard stool.  Advised her that this would need to be removed where she continued to have problems if a was the case.  Advised that she could become obstructed and have vomiting.  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  Dispense: 1 Device; Refill: 0    Intention tremor  -     clonazePAM (KLONOPIN) 0.5 MG tablet; 0.125 mg po qhs  Dispense: 6 tablet; Refill: 2    Constipation, unspecified constipation type  -     docusate sodium (COLACE) 100 MG capsule; Take 1 capsule (100 mg total) by mouth 2 (two) times daily. for 10 days  Dispense: 60 capsule; Refill: 11      Follow up in about 3 months (around 11/5/2019).      "

## 2019-08-26 ENCOUNTER — TELEPHONE (OUTPATIENT)
Dept: FAMILY MEDICINE | Facility: CLINIC | Age: 84
End: 2019-08-26

## 2019-08-26 NOTE — TELEPHONE ENCOUNTER
----- Message from Yumiko Tripathi sent at 8/26/2019  4:01 PM CDT -----  Pt is calling to  Get a  Urine  Test  Order  Put in  So   Pt  Can take a  Urine  Tomorrow  Before manda // please call [phone]  PATIENT  IS  REQUESTING A  WHEEL  CHAIR TOMORROW ALSO   TY

## 2019-08-28 ENCOUNTER — OFFICE VISIT (OUTPATIENT)
Dept: FAMILY MEDICINE | Facility: CLINIC | Age: 84
End: 2019-08-28
Payer: MEDICARE

## 2019-08-28 ENCOUNTER — HOSPITAL ENCOUNTER (EMERGENCY)
Facility: HOSPITAL | Age: 84
Discharge: HOME | End: 2019-08-28
Attending: EMERGENCY MEDICINE
Payer: MEDICARE

## 2019-08-28 VITALS
HEART RATE: 78 BPM | WEIGHT: 108 LBS | HEIGHT: 61 IN | BODY MASS INDEX: 20.39 KG/M2 | DIASTOLIC BLOOD PRESSURE: 58 MMHG | RESPIRATION RATE: 16 BRPM | OXYGEN SATURATION: 94 % | SYSTOLIC BLOOD PRESSURE: 130 MMHG

## 2019-08-28 VITALS
TEMPERATURE: 98 F | HEART RATE: 71 BPM | WEIGHT: 108.44 LBS | BODY MASS INDEX: 19.96 KG/M2 | DIASTOLIC BLOOD PRESSURE: 60 MMHG | HEIGHT: 62 IN | SYSTOLIC BLOOD PRESSURE: 80 MMHG

## 2019-08-28 DIAGNOSIS — N30.00 ACUTE CYSTITIS WITHOUT HEMATURIA: ICD-10-CM

## 2019-08-28 DIAGNOSIS — R30.0 DYSURIA: Primary | ICD-10-CM

## 2019-08-28 DIAGNOSIS — I95.9 HYPOTENSION, UNSPECIFIED HYPOTENSION TYPE: Primary | ICD-10-CM

## 2019-08-28 DIAGNOSIS — R03.1 LOW BLOOD PRESSURE READING: ICD-10-CM

## 2019-08-28 LAB
ANION GAP SERPL CALC-SCNC: 10 MMOL/L (ref 8–16)
BACTERIA #/AREA URNS HPF: ABNORMAL /HPF
BASOPHILS # BLD AUTO: 0.04 K/UL (ref 0–0.2)
BASOPHILS NFR BLD: 0.9 % (ref 0–1.9)
BILIRUB SERPL-MCNC: NEGATIVE MG/DL
BILIRUB UR QL STRIP: NEGATIVE
BLOOD URINE, POC: 50
BUN SERPL-MCNC: 19 MG/DL (ref 8–23)
CALCIUM SERPL-MCNC: 9.8 MG/DL (ref 8.7–10.5)
CHLORIDE SERPL-SCNC: 99 MMOL/L (ref 95–110)
CLARITY UR: ABNORMAL
CO2 SERPL-SCNC: 30 MMOL/L (ref 23–29)
COLOR UR: YELLOW
COLOR, POC UA: YELLOW
CREAT SERPL-MCNC: 0.6 MG/DL (ref 0.5–1.4)
DIFFERENTIAL METHOD: ABNORMAL
EOSINOPHIL # BLD AUTO: 0.1 K/UL (ref 0–0.5)
EOSINOPHIL NFR BLD: 2.4 % (ref 0–8)
ERYTHROCYTE [DISTWIDTH] IN BLOOD BY AUTOMATED COUNT: 13.4 % (ref 11.5–14.5)
EST. GFR  (AFRICAN AMERICAN): >60 ML/MIN/1.73 M^2
EST. GFR  (NON AFRICAN AMERICAN): >60 ML/MIN/1.73 M^2
GLUCOSE SERPL-MCNC: 83 MG/DL (ref 70–110)
GLUCOSE UR QL STRIP: NEGATIVE
GLUCOSE UR QL STRIP: NORMAL
HCT VFR BLD AUTO: 42.7 % (ref 37–48.5)
HGB BLD-MCNC: 13.2 G/DL (ref 12–16)
HGB UR QL STRIP: ABNORMAL
IMM GRANULOCYTES # BLD AUTO: 0.01 K/UL (ref 0–0.04)
KETONES UR QL STRIP: ABNORMAL
KETONES UR QL STRIP: NEGATIVE
LACTATE SERPL-SCNC: 1 MMOL/L (ref 0.5–2.2)
LEUKOCYTE ESTERASE UR QL STRIP: ABNORMAL
LEUKOCYTE ESTERASE URINE, POC: ABNORMAL
LYMPHOCYTES # BLD AUTO: 0.9 K/UL (ref 1–4.8)
LYMPHOCYTES NFR BLD: 19.8 % (ref 18–48)
MCH RBC QN AUTO: 27.8 PG (ref 27–31)
MCHC RBC AUTO-ENTMCNC: 30.9 G/DL (ref 32–36)
MCV RBC AUTO: 90 FL (ref 82–98)
MICROSCOPIC COMMENT: ABNORMAL
MONOCYTES # BLD AUTO: 0.4 K/UL (ref 0.3–1)
MONOCYTES NFR BLD: 9 % (ref 4–15)
NEUTROPHILS # BLD AUTO: 3.1 K/UL (ref 1.8–7.7)
NEUTROPHILS NFR BLD: 67.7 % (ref 38–73)
NITRITE UR QL STRIP: POSITIVE
NITRITE, POC UA: POSITIVE
NRBC BLD-RTO: 0 /100 WBC
PH UR STRIP: 6 [PH] (ref 5–8)
PH, POC UA: 5
PLATELET # BLD AUTO: 175 K/UL (ref 150–350)
PMV BLD AUTO: 10.7 FL (ref 9.2–12.9)
POTASSIUM SERPL-SCNC: 4.4 MMOL/L (ref 3.5–5.1)
PROT UR QL STRIP: NEGATIVE
PROTEIN, POC: 30
RBC # BLD AUTO: 4.75 M/UL (ref 4–5.4)
RBC #/AREA URNS HPF: 0 /HPF (ref 0–4)
SODIUM SERPL-SCNC: 139 MMOL/L (ref 136–145)
SP GR UR STRIP: 1.02 (ref 1–1.03)
SPECIFIC GRAVITY, POC UA: 1.02
SQUAMOUS #/AREA URNS HPF: 2 /HPF
URN SPEC COLLECT METH UR: ABNORMAL
UROBILINOGEN UR STRIP-ACNC: NEGATIVE EU/DL
UROBILINOGEN, POC UA: NORMAL
WBC # BLD AUTO: 4.54 K/UL (ref 3.9–12.7)
WBC #/AREA URNS HPF: 45 /HPF (ref 0–5)

## 2019-08-28 PROCEDURE — 99214 OFFICE O/P EST MOD 30 MIN: CPT | Mod: 25,S$GLB,, | Performed by: NURSE PRACTITIONER

## 2019-08-28 PROCEDURE — 1101F PR PT FALLS ASSESS DOC 0-1 FALLS W/OUT INJ PAST YR: ICD-10-PCS | Mod: CPTII,S$GLB,, | Performed by: NURSE PRACTITIONER

## 2019-08-28 PROCEDURE — 36415 COLL VENOUS BLD VENIPUNCTURE: CPT

## 2019-08-28 PROCEDURE — 83605 ASSAY OF LACTIC ACID: CPT

## 2019-08-28 PROCEDURE — 96360 HYDRATION IV INFUSION INIT: CPT

## 2019-08-28 PROCEDURE — 81000 URINALYSIS NONAUTO W/SCOPE: CPT

## 2019-08-28 PROCEDURE — 80048 BASIC METABOLIC PNL TOTAL CA: CPT

## 2019-08-28 PROCEDURE — 87086 URINE CULTURE/COLONY COUNT: CPT | Mod: 59

## 2019-08-28 PROCEDURE — 25000003 PHARM REV CODE 250: Performed by: EMERGENCY MEDICINE

## 2019-08-28 PROCEDURE — 87088 URINE BACTERIA CULTURE: CPT | Mod: 59

## 2019-08-28 PROCEDURE — 81002 URINALYSIS NONAUTO W/O SCOPE: CPT | Mod: S$GLB,,, | Performed by: NURSE PRACTITIONER

## 2019-08-28 PROCEDURE — 1101F PT FALLS ASSESS-DOCD LE1/YR: CPT | Mod: CPTII,S$GLB,, | Performed by: NURSE PRACTITIONER

## 2019-08-28 PROCEDURE — 99214 PR OFFICE/OUTPT VISIT, EST, LEVL IV, 30-39 MIN: ICD-10-PCS | Mod: 25,S$GLB,, | Performed by: NURSE PRACTITIONER

## 2019-08-28 PROCEDURE — 99999 PR PBB SHADOW E&M-EST. PATIENT-LVL IV: ICD-10-PCS | Mod: PBBFAC,,, | Performed by: NURSE PRACTITIONER

## 2019-08-28 PROCEDURE — 99999 PR PBB SHADOW E&M-EST. PATIENT-LVL IV: CPT | Mod: PBBFAC,,, | Performed by: NURSE PRACTITIONER

## 2019-08-28 PROCEDURE — 87086 URINE CULTURE/COLONY COUNT: CPT

## 2019-08-28 PROCEDURE — 81002 POCT URINE DIPSTICK WITHOUT MICROSCOPE: ICD-10-PCS | Mod: S$GLB,,, | Performed by: NURSE PRACTITIONER

## 2019-08-28 PROCEDURE — 63600175 PHARM REV CODE 636 W HCPCS: Performed by: EMERGENCY MEDICINE

## 2019-08-28 PROCEDURE — 85025 COMPLETE CBC W/AUTO DIFF WBC: CPT

## 2019-08-28 PROCEDURE — 87088 URINE BACTERIA CULTURE: CPT

## 2019-08-28 PROCEDURE — 99284 EMERGENCY DEPT VISIT MOD MDM: CPT | Mod: 25

## 2019-08-28 RX ORDER — NITROFURANTOIN MACROCRYSTALS 50 MG/1
100 CAPSULE ORAL
Status: DISCONTINUED | OUTPATIENT
Start: 2019-08-28 | End: 2019-08-28 | Stop reason: SDUPTHER

## 2019-08-28 RX ORDER — NITROFURANTOIN 25; 75 MG/1; MG/1
CAPSULE ORAL
Status: DISCONTINUED
Start: 2019-08-28 | End: 2019-08-28 | Stop reason: HOSPADM

## 2019-08-28 RX ORDER — NITROFURANTOIN 25; 75 MG/1; MG/1
100 CAPSULE ORAL
Status: COMPLETED | OUTPATIENT
Start: 2019-08-28 | End: 2019-08-28

## 2019-08-28 RX ORDER — NITROFURANTOIN 25; 75 MG/1; MG/1
100 CAPSULE ORAL 2 TIMES DAILY
Qty: 9 CAPSULE | Refills: 0 | Status: SHIPPED | OUTPATIENT
Start: 2019-08-29 | End: 2019-08-29

## 2019-08-28 RX ADMIN — SODIUM CHLORIDE, SODIUM LACTATE, POTASSIUM CHLORIDE, AND CALCIUM CHLORIDE 500 ML: .6; .31; .03; .02 INJECTION, SOLUTION INTRAVENOUS at 05:08

## 2019-08-28 RX ADMIN — NITROFURANTOIN MONOHYDRATE/MACROCRYSTALLINE 100 MG: 25; 75 CAPSULE ORAL at 06:08

## 2019-08-28 NOTE — ED PROVIDER NOTES
Encounter Date: 8/28/2019    SCRIBE #1 NOTE: I, Nona Foss, am scribing for, and in the presence of, Ugo Salvador MD.       History     Chief Complaint   Patient presents with    Hypotension     at clinic 88/62 sent to ED       Time seen by provider: 4:47 PM on 08/28/2019    Jennifer Diaz is a 86 y.o. female with a PMHx of asthma who presents to the ED via EMS with an onset of hypotension, which occurred PTA. Pt is sent here from Dr. Mart's office for having a blood pressure of 88/62. She states since Friday, she's been having burning with urination and frequency. She also c/o a decrease appetite which has been ongoing for 2 days. It's reported that pt has been experiencing weakness for 1 week. The patient denies flank pain, abdominal pain, nausea, vomiting, hematuria, fever, LOC, or any other symptoms at this time. PSHx of total abdominal hysterectomy with bilateral salpingoophorectomy, tonsillectomy, adenoidectomy, dilation and curettage of uterus, and umbilical hernia repair. Epinephrine, Codeine, and Pcn drug allergies.    The history is provided by the patient and the EMS personnel.     Review of patient's allergies indicates:   Allergen Reactions    Epinephrine      Seizure like activity      Codeine     Pcn [penicillins]      Past Medical History:   Diagnosis Date    Acid reflux     Family history of breast cancer     Family history of diabetes mellitus     Intrinsic asthma     Psoriasis      Past Surgical History:   Procedure Laterality Date    DILATION AND CURETTAGE OF UTERUS      x2    TONSILLECTOMY, ADENOIDECTOMY  as child    TOTAL ABDOMINAL HYSTERECTOMY W/ BILATERAL SALPINGOOPHORECTOMY  6/2012    UMBILICAL HERNIA REPAIR       Family History   Problem Relation Age of Onset    Cancer Mother         pancreas    Heart attack Father     Cancer Sister         breast    Diabetes Brother      Social History     Tobacco Use    Smoking status: Never Smoker    Smokeless  tobacco: Never Used   Substance Use Topics    Alcohol use: No    Drug use: No     Review of Systems   Constitutional: Positive for appetite change (decreased). Negative for fever.   HENT: Negative for sore throat.    Respiratory: Negative for shortness of breath.    Cardiovascular: Negative for chest pain.   Gastrointestinal: Negative for abdominal pain, nausea and vomiting.   Genitourinary: Positive for dysuria and frequency. Negative for flank pain and hematuria.   Musculoskeletal: Negative for back pain.   Skin: Negative for rash.   Neurological: Positive for weakness (generalized). Negative for syncope.   Hematological: Does not bruise/bleed easily.       Physical Exam     Initial Vitals [08/28/19 1643]   BP Pulse Resp Temp SpO2   (!) 112/57 64 16 -- 96 %      MAP       --         Physical Exam    Nursing note and vitals reviewed.  Constitutional: She appears well-developed and well-nourished. She is not diaphoretic. No distress.   HENT:   Head: Normocephalic and atraumatic.   Mouth/Throat: Oropharynx is clear and moist and mucous membranes are normal.   Eyes: Conjunctivae are normal.   Neck: Neck supple.   Cardiovascular: Normal rate, regular rhythm, normal heart sounds and intact distal pulses. Exam reveals no gallop and no friction rub.    No murmur heard.  Pulmonary/Chest: Breath sounds normal. She has no wheezes. She has no rhonchi. She has no rales.   Abdominal: Soft. She exhibits no distension. There is no tenderness. There is no CVA tenderness.   No flank tenderness.   Musculoskeletal: Normal range of motion.   Neurological: She is alert and oriented to person, place, and time.   Skin: No rash noted. No erythema.   Psychiatric: Her speech is normal.         ED Course   Procedures  Labs Reviewed   CBC W/ AUTO DIFFERENTIAL   BASIC METABOLIC PANEL   LACTIC ACID, PLASMA   URINALYSIS, REFLEX TO URINE CULTURE          Imaging Results    None          Medical Decision Making:   History:   Old Medical Records:  I decided to obtain old medical records.  Clinical Tests:   Lab Tests: Ordered and Reviewed            Scribe Attestation:   Scribe #1: I performed the above scribed service and the documentation accurately describes the services I performed. I attest to the accuracy of the note.    I, Dr. Ugo Salvador, personally performed the services described in this documentation. All medical record entries made by the scribe were at my direction and in my presence.  I have reviewed the chart and agree that the record reflects my personal performance and is accurate and complete. Ugo Salvador MD.  9:44 PM 08/28/2019    Jennifer Diaz is a 86 y.o. female presenting with decreased blood pressure pre-hospital in the setting of urinary symptoms consistent with acute, uncomplicated cystitis.  I doubt sepsis, pyelonephritis.  I do not think cultures or inpatient observation or IV antibiotics are necessary at this time.  She has a benign abdominal exam with very low suspicion for other alternative intra-abdominal processes obstructive uropathy, abscess, obstruction, perforation, appendicitis.  I do not think further abdominal imaging is indicated.  She is well-appearing and eating a diet at the time of my reassessment.  She wishes to go home.  She has adequate creatinine clearance of around 50 on calculation with Macrobid initiated pending outpatient PCP follow-up.  Return precautions reviewed.             Clinical Impression:       ICD-10-CM ICD-9-CM   1. Hypotension, unspecified hypotension type I95.9 458.9   2. Acute cystitis without hematuria N30.00 595.0                                Ugo Salvador MD  08/28/19 8669

## 2019-08-28 NOTE — PROGRESS NOTES
Subjective:       Patient ID: Jennifer Diaz is a 86 y.o. female.    Chief Complaint: urinary frequency, burning with urination    Patient who is new to me presents for a possible UTI. She has not been able to eat or drink due to fatigue. She lives alone. She denies any fever, dizziness, CP or SOB.     Urinary Tract Infection    This is a new problem. The current episode started in the past 7 days. The problem occurs every urination. The problem has been gradually worsening. The quality of the pain is described as burning. There has been no fever. She is not sexually active. Associated symptoms include frequency and urgency. Pertinent negatives include no chills, discharge, flank pain, hematuria, hesitancy, nausea, vomiting, constipation or rash. She has tried increased fluids (drinking cranberry juice) for the symptoms.     Review of Systems   Constitutional: Negative for chills, fatigue and fever.   HENT: Negative for congestion, sinus pressure, sinus pain, sneezing and sore throat.    Respiratory: Negative for cough, chest tightness, shortness of breath and wheezing.    Cardiovascular: Negative for chest pain, palpitations and leg swelling.   Gastrointestinal: Negative for abdominal distention, abdominal pain, constipation, diarrhea, nausea and vomiting.   Genitourinary: Positive for dysuria, frequency and urgency. Negative for decreased urine volume, difficulty urinating, flank pain, hematuria and hesitancy.   Musculoskeletal: Negative for arthralgias, gait problem, joint swelling and myalgias.   Skin: Negative for rash and wound.   Neurological: Negative for dizziness, light-headedness, numbness and headaches.       Objective:      Physical Exam   Constitutional: She is oriented to person, place, and time. She appears well-developed.   Pale and Frail appearing elderly woman   HENT:   Head: Normocephalic and atraumatic.   Right Ear: External ear normal.   Left Ear: External ear normal.   Nose: Nose  normal.   Mouth/Throat: Oropharynx is clear and moist.   Eyes: Pupils are equal, round, and reactive to light.   Neck: Normal range of motion.   Cardiovascular: Normal rate, regular rhythm, normal heart sounds and intact distal pulses.   Pulmonary/Chest: Effort normal and breath sounds normal.   Abdominal: Soft. Bowel sounds are normal. There is no CVA tenderness.   Musculoskeletal: Normal range of motion.   Neurological: She is alert and oriented to person, place, and time.   Skin: Skin is warm and dry.   Nursing note and vitals reviewed.      Assessment:       1. Dysuria    2. Low blood pressure reading    3. Acute cystitis without hematuria        Plan:       Jennifer was seen today for urinary frequency, burning with urination.    Diagnoses and all orders for this visit:    Dysuria  -     Urine culture  -     POCT URINE DIPSTICK WITHOUT MICROSCOPE    Low blood pressure reading  -     Urine culture  -     POCT URINE DIPSTICK WITHOUT MICROSCOPE    Acute cystitis without hematuria  -     Urine culture  -     POCT URINE DIPSTICK WITHOUT MICROSCOPE      Patient will low bp reading ranging from 90-80 systolic and 60 diastolic. Discussed with patient she would best be treated in the ER due to possible dehydration vs sepsis. Patient verbalized understanding and will go to ER via EMS. Report given to EMS.

## 2019-08-29 ENCOUNTER — TELEPHONE (OUTPATIENT)
Dept: FAMILY MEDICINE | Facility: CLINIC | Age: 84
End: 2019-08-29

## 2019-08-29 RX ORDER — NITROFURANTOIN 25; 75 MG/1; MG/1
100 CAPSULE ORAL 2 TIMES DAILY
Qty: 12 CAPSULE | Refills: 0 | Status: SHIPPED | OUTPATIENT
Start: 2019-08-29 | End: 2019-08-30

## 2019-08-29 NOTE — TELEPHONE ENCOUNTER
----- Message from Linnea Ventura sent at 8/29/2019  1:02 PM CDT -----  Contact: patient  Type: Needs Medical Advice    Who Called:  patient  Symptoms (please be specific):  uti  How long has patient had these symptoms:  yesterday  Pharmacy name and phone #:    [id number] [address] & [address] [address]  Phone: [phone] Fax: [phone]  Best Call Back Number: [phone]  Additional Information: Patient states office needs to send nitrofurantoin, macrocrystal-monohydrate, (MACROBID) 100 MG capsule to the pharmacy.  Please call to advise and let patient know its at the pharmacy. Thanks!

## 2019-08-30 DIAGNOSIS — N39.0 URINARY TRACT INFECTION WITHOUT HEMATURIA, SITE UNSPECIFIED: Primary | ICD-10-CM

## 2019-08-30 LAB
BACTERIA UR CULT: ABNORMAL
BACTERIA UR CULT: ABNORMAL

## 2019-08-30 RX ORDER — SULFAMETHOXAZOLE AND TRIMETHOPRIM 800; 160 MG/1; MG/1
1 TABLET ORAL 2 TIMES DAILY
Qty: 14 TABLET | Refills: 0 | Status: SHIPPED | OUTPATIENT
Start: 2019-08-30 | End: 2019-09-06

## 2019-08-30 NOTE — PROGRESS NOTES
Please call the patient and let her know that the urine culture shows the antibiotic she is on will not treat the infection. I have sent another in to her pharmacy. Its called [facility]. Thanks.

## 2019-09-03 ENCOUNTER — TELEPHONE (OUTPATIENT)
Dept: FAMILY MEDICINE | Facility: CLINIC | Age: 84
End: 2019-09-03

## 2019-09-03 NOTE — TELEPHONE ENCOUNTER
Scheduled appt for tomorrow, patient stated that she will have to get back with us to let us know if she could make it or not.

## 2019-09-03 NOTE — TELEPHONE ENCOUNTER
----- Message from Mireya Hernandez sent at 9/3/2019  1:41 PM CDT -----  Contact: patient  Type:  Sooner Apoointment Request    Caller is requesting a sooner appointment.  Caller declined first available appointment listed below.  Caller will not accept being placed on the waitlist and is requesting a message be sent to doctor.    Name of Caller:  ranjan  When is the first available appointment?  n/a  Symptoms:  F/u ER visit on 8-28-19  Best Call Back Number:  [phone]  Additional Information:  Either tomorrow or thursday

## 2019-09-11 ENCOUNTER — TELEPHONE (OUTPATIENT)
Dept: FAMILY MEDICINE | Facility: CLINIC | Age: 84
End: 2019-09-11

## 2019-09-11 NOTE — TELEPHONE ENCOUNTER
----- Message from Amie Murcia sent at 9/11/2019  3:52 PM CDT -----  Contact: self  Patient requesting a nurse visit to have blood pressure checked       Patient will like appt to be on 09/13 at 3 pm if possible please         Patient contact is [phone] (home)

## 2019-09-16 ENCOUNTER — TELEPHONE (OUTPATIENT)
Dept: FAMILY MEDICINE | Facility: CLINIC | Age: 84
End: 2019-09-16

## 2019-09-16 NOTE — TELEPHONE ENCOUNTER
----- Message from Ashleigh Denice sent at 9/16/2019 11:45 AM CDT -----  Contact: patient  Type: Needs Medical Advice    Who Called:  pt  Best Call Back Number:   Additional Information: Requesting a call back from Nurse, regarding a sooner for Bp check, and mediation

## 2019-09-20 ENCOUNTER — TELEPHONE (OUTPATIENT)
Dept: FAMILY MEDICINE | Facility: CLINIC | Age: 84
End: 2019-09-20

## 2019-09-20 NOTE — TELEPHONE ENCOUNTER
----- Message from Linnea Ventura sent at 9/20/2019  1:14 PM CDT -----  Contact: patient  Type: Needs Medical Advice    Who Called:  patient  Symptoms (please be specific):  na  How long has patient had these symptoms:  mary  Pharmacy name and phone #:  [facility]  Best Call Back Number: [phone]  Additional Information: hospital f/ u apt. Please call ot advise and schedule. Thanks

## 2019-09-25 ENCOUNTER — DOCUMENTATION ONLY (OUTPATIENT)
Dept: FAMILY MEDICINE | Facility: CLINIC | Age: 84
End: 2019-09-25

## 2019-09-25 ENCOUNTER — OFFICE VISIT (OUTPATIENT)
Dept: FAMILY MEDICINE | Facility: CLINIC | Age: 84
End: 2019-09-25
Payer: MEDICARE

## 2019-09-25 VITALS
HEIGHT: 61 IN | WEIGHT: 105.19 LBS | RESPIRATION RATE: 16 BRPM | HEART RATE: 78 BPM | SYSTOLIC BLOOD PRESSURE: 114 MMHG | OXYGEN SATURATION: 92 % | BODY MASS INDEX: 19.86 KG/M2 | DIASTOLIC BLOOD PRESSURE: 62 MMHG | TEMPERATURE: 98 F

## 2019-09-25 DIAGNOSIS — R53.81 DEBILITY: ICD-10-CM

## 2019-09-25 DIAGNOSIS — R03.1 LOW BLOOD PRESSURE READING: ICD-10-CM

## 2019-09-25 DIAGNOSIS — J30.89 NON-SEASONAL ALLERGIC RHINITIS, UNSPECIFIED TRIGGER: ICD-10-CM

## 2019-09-25 DIAGNOSIS — N39.0 URINARY TRACT INFECTION WITHOUT HEMATURIA, SITE UNSPECIFIED: Primary | ICD-10-CM

## 2019-09-25 PROCEDURE — 99214 PR OFFICE/OUTPT VISIT, EST, LEVL IV, 30-39 MIN: ICD-10-PCS | Mod: S$GLB,,, | Performed by: INTERNAL MEDICINE

## 2019-09-25 PROCEDURE — 1101F PT FALLS ASSESS-DOCD LE1/YR: CPT | Mod: CPTII,S$GLB,, | Performed by: INTERNAL MEDICINE

## 2019-09-25 PROCEDURE — 1101F PR PT FALLS ASSESS DOC 0-1 FALLS W/OUT INJ PAST YR: ICD-10-PCS | Mod: CPTII,S$GLB,, | Performed by: INTERNAL MEDICINE

## 2019-09-25 PROCEDURE — 99214 OFFICE O/P EST MOD 30 MIN: CPT | Mod: S$GLB,,, | Performed by: INTERNAL MEDICINE

## 2019-09-25 RX ORDER — FLUTICASONE PROPIONATE 50 MCG
2 SPRAY, SUSPENSION (ML) NASAL DAILY
Qty: 16 G | Refills: 11 | Status: SHIPPED | OUTPATIENT
Start: 2019-09-25

## 2019-09-25 RX ORDER — IPRATROPIUM BROMIDE 42 UG/1
2 SPRAY, METERED NASAL 4 TIMES DAILY
Qty: 15 ML | Refills: 5 | Status: SHIPPED | OUTPATIENT
Start: 2019-09-25

## 2019-09-25 NOTE — PATIENT INSTRUCTIONS
Drink plenty of fluid.    Thank you for choosing [facility].     Please fill out the patient experience survey.

## 2019-09-25 NOTE — PROGRESS NOTES
"Subjective:      2:23 PM     Patient ID: Jennifer Diaz is a 87 y.o. female.    Chief Complaint: ER follow up hypotension (refills)    HPI          CHIEF COMPLAINT: Bladder/UTI(+).  HPI:  Was evaluated in the ER for hypotension.  She was sent home on Macrobid and did very well    ONSET/TIMING: Onset    1 month  ago. Sudden:: no .     DURATION:  continuous    QUALITY/COURSE:   unchanged     LOCATION: pain/pressure: suprapubic    .     INTENSITY/SEVERITY: # 0  /10 (on a 1 to 10 scale).    CONTEXT/WHEN: --Similar problems:  No 1st UTI. .  Past_treatments: no . Past_evaluations: no    MODIFIERS/TREATMENTS:  .     The following symptoms are positive if BOLD, negative otherwise.       REVIEW OF SYMPTOMS:Urine_Frequency . Urine_Urgency . Dysuria . Suprapubic_Pain . Hematuria . Urine_Incontinence . . Fever . Chills . Nausea . Vomiting . Perineal Pain .  Sharp_pain . Dull_pain . Burning_pain .Tenesmus . Back_pain . Vaginal_Discharge . Vaginal_Itching . Vaginal_Burning . Dyspareunia .       The patient is using very low-dose Klonopin to sleep.  I have advised her that this is dangerous particularly since she is frail and extreme fall risk.  I have discussed alternatives such as melatonin Desyrel,  and trazodone and she will look these up.    Patient has had a runny nose for the last 3 weeks.  No congestion. No fever.    She is asked growing for motorized scooter.  She does use a walker in the house however                     Review of Systems      Objective:      Vitals:    09/25/19 1407   BP: 114/62   Pulse: 78   Resp: 16   Temp: 98.2 °F (36.8 °C)   TempSrc: Oral   SpO2: (!) 92%   Weight: 47.7 kg (105 lb 2.6 oz)   Height: 5' 1" (1.549 m)   PainSc: 0-No pain     Physical Exam   Constitutional: She appears well-developed and well-nourished.   Cardiovascular: Normal rate, regular rhythm and normal heart sounds.   Pulmonary/Chest: Effort normal and breath sounds normal.   Abdominal: Soft. There is no tenderness. "   Neurological: She is alert.   Psychiatric: She has a normal mood and affect. Her behavior is normal. Thought content normal.   Nursing note and vitals reviewed.        Assessment:       1. Urinary tract infection without hematuria, site unspecified    2. Low blood pressure reading    3. Non-seasonal allergic rhinitis, unspecified trigger    4. Debility          Plan:   In 3 weeks she is due to have her Klonopin renewed; advised her that we will slowly decrease the amount.  She threatened to get another doctor.  Advised her that she has to qualify for the scooter by not being mobile with a walker in the house.    gUrinary tract infection without hematuria, site unspecified  -     fluticasone propionate (FLONASE) 50 mcg/actuation nasal spray; 2 sprays (100 mcg total) by Each Nostril route once daily.  Dispense: 16 g; Refill: 11  -     ipratropium (ATROVENT) 42 mcg (0.06 %) nasal spray; 2 sprays by Nasal route 4 (four) times daily.  Dispense: 15 mL; Refill: 5    Low blood pressure reading    Non-seasonal allergic rhinitis, unspecified trigger    Debility  -     CBC auto differential; Future; Expected date: 09/25/2019  -     Comprehensive metabolic panel; Future; Expected date: 09/25/2019    Other orders  -     Influenza - High Dose (65+) (PF) (IM)      Follow up in about 3 months (around 12/25/2019).

## 2019-09-30 PROCEDURE — G0008 FLU VACCINE - HIGH DOSE (65+) PRESERVATIVE FREE IM: ICD-10-PCS | Mod: S$GLB,,, | Performed by: INTERNAL MEDICINE

## 2019-09-30 PROCEDURE — 90662 FLU VACCINE - HIGH DOSE (65+) PRESERVATIVE FREE IM: ICD-10-PCS | Mod: S$GLB,,, | Performed by: INTERNAL MEDICINE

## 2019-09-30 PROCEDURE — 90662 IIV NO PRSV INCREASED AG IM: CPT | Mod: S$GLB,,, | Performed by: INTERNAL MEDICINE

## 2019-09-30 PROCEDURE — G0008 ADMIN INFLUENZA VIRUS VAC: HCPCS | Mod: S$GLB,,, | Performed by: INTERNAL MEDICINE

## 2019-10-14 ENCOUNTER — TELEPHONE (OUTPATIENT)
Dept: FAMILY MEDICINE | Facility: CLINIC | Age: 84
End: 2019-10-14

## 2019-10-14 NOTE — TELEPHONE ENCOUNTER
----- Message from Amie Murcia sent at 10/14/2019  3:53 PM CDT -----  Contact: self  Patient requesting Alexa requesting to speak to Alexa regarding scheduling appt to take blood pressure and weigh her per patient       Please call to advice [phone] (home)

## 2019-10-21 ENCOUNTER — TELEPHONE (OUTPATIENT)
Dept: FAMILY MEDICINE | Facility: CLINIC | Age: 84
End: 2019-10-21

## 2019-10-21 NOTE — TELEPHONE ENCOUNTER
----- Message from Kaila Foley sent at 10/21/2019  2:08 PM CDT -----  Contact: jennifer pt  Type:  RX Refill Request    Who Called:  Jennifer  Refill or New Rx:  refill  RX Name and Strength:  clonazePAM (KLONOPIN) 0.5 MG tablet  How is the patient currently taking it? (ex. 1XDay): n/a  Is this a 30 day or 90 day RX:  n/a  Ordering Provider:  Barron  Best Call Back Number:  [phone]  Additional Information:  Pls call pt regarding refill. She is requesting to speak to Dr Mart regarding med.

## 2019-10-21 NOTE — TELEPHONE ENCOUNTER
----- Message from Diogo Johnston sent at 10/21/2019 12:07 PM CDT -----  Contact: patient  Type:  Sooner Apoointment Request    Caller is requesting a sooner appointment.  Caller declined first available appointment listed below.  Caller will not accept being placed on the waitlist and is requesting a message be sent to doctor.    Name of Caller:  patient  When is the first available appointment?  10/29  Symptoms:    Best Call Back Number:  [phone]  Additional Information:  Requesting a call back to confirm f/u appt for meds refills/blood pressure

## 2019-10-23 ENCOUNTER — OFFICE VISIT (OUTPATIENT)
Dept: FAMILY MEDICINE | Facility: CLINIC | Age: 84
End: 2019-10-23
Payer: MEDICARE

## 2019-10-23 VITALS
DIASTOLIC BLOOD PRESSURE: 62 MMHG | HEIGHT: 61 IN | TEMPERATURE: 98 F | RESPIRATION RATE: 16 BRPM | OXYGEN SATURATION: 93 % | WEIGHT: 101.19 LBS | BODY MASS INDEX: 19.11 KG/M2 | SYSTOLIC BLOOD PRESSURE: 118 MMHG | HEART RATE: 71 BPM

## 2019-10-23 DIAGNOSIS — G47.00 INSOMNIA, UNSPECIFIED TYPE: Primary | ICD-10-CM

## 2019-10-23 DIAGNOSIS — G25.2 INTENTION TREMOR: ICD-10-CM

## 2019-10-23 PROCEDURE — 99213 PR OFFICE/OUTPT VISIT, EST, LEVL III, 20-29 MIN: ICD-10-PCS | Mod: S$GLB,,, | Performed by: INTERNAL MEDICINE

## 2019-10-23 PROCEDURE — 99213 OFFICE O/P EST LOW 20 MIN: CPT | Mod: S$GLB,,, | Performed by: INTERNAL MEDICINE

## 2019-10-23 PROCEDURE — 1101F PR PT FALLS ASSESS DOC 0-1 FALLS W/OUT INJ PAST YR: ICD-10-PCS | Mod: CPTII,S$GLB,, | Performed by: INTERNAL MEDICINE

## 2019-10-23 PROCEDURE — 1101F PT FALLS ASSESS-DOCD LE1/YR: CPT | Mod: CPTII,S$GLB,, | Performed by: INTERNAL MEDICINE

## 2019-10-23 RX ORDER — CLONAZEPAM 0.5 MG/1
TABLET ORAL
Qty: 6 TABLET | Refills: 2 | Status: SHIPPED | OUTPATIENT
Start: 2019-10-23 | End: 2020-01-08 | Stop reason: SDUPTHER

## 2019-10-23 RX ORDER — DESIPRAMINE HYDROCHLORIDE 10 MG/1
10 TABLET ORAL NIGHTLY
Qty: 30 TABLET | Refills: 11 | Status: SHIPPED | OUTPATIENT
Start: 2019-10-23 | End: 2020-06-02

## 2019-10-23 NOTE — PATIENT INSTRUCTIONS
No television for an hour before you go to bed.  If you can fall asleep in 40 min go do something outside of your bedroom until you're feeling tired    Thank you for choosing [facility].     Please fill out the patient experience survey.

## 2019-10-23 NOTE — PROGRESS NOTES
"Subjective:      2:51 PM     Patient ID: Jennifer Diaz is a 87 y.o. female.    Chief Complaint: Tremors (refills)    HPI CHIEF :COMPLAINT:  Tremors  HPI:  ONSET:    Years    ago.  She  DURATION: . intermittant  QUALITY/COURSE: .  Getting better  LOCATION:  Both hands  INTENSITY/SEVERITY: # four/10 (on 1 to 10 scale)  CONTEXT/WHEN:          MODIFIERS/TREATMENTS: Taking Medications: ? .     Compliant with Taking Prescribed Medications: ? yes  . Prior X-Rays: no  Prior Labs: no    Movements, medications or activities that worsen the problem:       Movements, medications, or activities that ease the problem:  Klonopin and Inderal    The below section is positive for the patient ONLY if BOLDED:    SYMPTOMS/RELATED:       CHIEF COMPLAINT: insomnia .  HPI:  Very dependent on the Klonopin and has run out for 3 days and not slept.  She does fall asleep on the sofa better.  She watches television before she goes to bed usually    ONSET/TIMING: Onset         ago. Sudden: no .  Similar problems in past: no. ..    DURATION:  Intermittent    QUALITY/COURSE: .unchanged.     Can't fall asleep: yes. . Wakes up early: yes.      INTENSITY/SEVERITY:  Severity 5    (on a 1-10 scale).      MODIFIERS/TREATMENTS: . Taking medications: yes.  . Effective: yes.  SYMPTOMS/RELATED: . Possible medication side effects include:     .     The following symptoms/statements  are positive if BOLD, negative otherwise.      CONTEXT/WHEN:  .Nocturnal. . Napping.  shift work . Time_zone_changes.     REVIEW OF SYSTEMS :  .   nocturia . Restless_legs . pain . Obstructive_sleep_apnea  . depression . anxiety . Substance_abuse . Copd . Grief .         Review of Systems      Objective:      Vitals:    10/23/19 1432   BP: 118/62   Pulse: 71   Resp: 16   Temp: 98.1 °F (36.7 °C)   TempSrc: Oral   SpO2: (!) 93%   Weight: 45.9 kg (101 lb 3.1 oz)   Height: 5' 1" (1.549 m)   PainSc: 0-No pain     Physical Exam   Constitutional: She appears well-developed and " well-nourished.   Cardiovascular: Normal rate, regular rhythm and normal heart sounds.   Pulmonary/Chest: Effort normal and breath sounds normal.   Abdominal: Soft. There is no tenderness.   Neurological: She is alert.   Psychiatric: She has a normal mood and affect. Her behavior is normal. Thought content normal.   Nursing note and vitals reviewed.        Assessment:       1. Insomnia, unspecified type    2. Intention tremor          Plan:       Insomnia, unspecified type  -     clonazePAM (KLONOPIN) 0.5 MG tablet; 0.125 mg po every bedtime  Dispense: 6 tablet; Refill: 2  -     desipramine (NOPRAMIN) 10 MG tablet; Take 1 tablet (10 mg total) by mouth every evening.  Dispense: 30 tablet; Refill: 11    Intention tremor  -     clonazePAM (KLONOPIN) 0.5 MG tablet; 0.125 mg po every bedtime  Dispense: 6 tablet; Refill: 2      Follow up in about 3 months (around 1/23/2020).

## 2019-10-31 ENCOUNTER — TELEPHONE (OUTPATIENT)
Dept: FAMILY MEDICINE | Facility: CLINIC | Age: 84
End: 2019-10-31

## 2019-10-31 DIAGNOSIS — Z74.09 MOBILITY POOR: Primary | ICD-10-CM

## 2019-10-31 NOTE — TELEPHONE ENCOUNTER
Spoke with patient.  Having issues bathing.  Afraid of falling.  She spoke with insurance and they told her to call us.  Fwd to provider.

## 2019-10-31 NOTE — TELEPHONE ENCOUNTER
----- Message from Antonette Hart sent at 10/31/2019  3:05 PM CDT -----  Contact: self  Patient want to speak with a nurse regarding setting up home health for her please call back at [phone] ([place])

## 2019-11-06 ENCOUNTER — TELEPHONE (OUTPATIENT)
Dept: FAMILY MEDICINE | Facility: CLINIC | Age: 84
End: 2019-11-06

## 2019-11-06 NOTE — TELEPHONE ENCOUNTER
----- Message from Kathleen Solitario sent at 11/6/2019  3:49 PM CST -----  Contact: Ashleigh MALIK  Type: Needs Medical Advice    Who Called:  Ashleigh MALIK  Best Call Back Number: [phone]  Additional Information: requesting a call back regarding referral we sent to them--they are not in network & can't see patient--please advise--thank you

## 2019-11-07 ENCOUNTER — TELEPHONE (OUTPATIENT)
Dept: FAMILY MEDICINE | Facility: CLINIC | Age: 84
End: 2019-11-07

## 2019-11-07 NOTE — TELEPHONE ENCOUNTER
----- Message from Ashleigh Denice sent at 11/7/2019 11:33 AM CST -----  Contact: [facility]  Type: Needs Medical Advice    Who Called:  Thien    Best Call Back Number:      Additional Information: Requesting a call back from Nurse, regarding a home health order that was faxed over and they need clarification   and recent clinical notes,please call back with advice

## 2019-11-12 ENCOUNTER — TELEPHONE (OUTPATIENT)
Dept: FAMILY MEDICINE | Facility: CLINIC | Age: 84
End: 2019-11-12

## 2019-11-12 NOTE — TELEPHONE ENCOUNTER
Advised [facility] the patient was wanting an aide to help her around the house. [facility]riel advised that HH can't be ordered for just a sitter. She will contact their  to reach patient to further discuss community options that can assist her.

## 2019-11-12 NOTE — TELEPHONE ENCOUNTER
----- Message from Gabriela Chanel sent at 11/12/2019  8:46 AM CST -----  Contact: [facility]riel  Type: Needs Medical Advice    Who Called:  [facility] / [facility]  Best Call Back Number: [phone]  Fax number: [phone]  Additional Information: home health request received. Need clarification on what the doctor wants, home bound status, and recent clinical info to support the request.

## 2020-01-05 DIAGNOSIS — G47.00 INSOMNIA, UNSPECIFIED TYPE: ICD-10-CM

## 2020-01-05 DIAGNOSIS — G25.2 INTENTION TREMOR: ICD-10-CM

## 2020-01-06 ENCOUNTER — TELEPHONE (OUTPATIENT)
Dept: FAMILY MEDICINE | Facility: CLINIC | Age: 85
End: 2020-01-06

## 2020-01-06 RX ORDER — CLONAZEPAM 0.5 MG/1
TABLET ORAL
Qty: 6 TABLET | OUTPATIENT
Start: 2020-01-06

## 2020-01-06 NOTE — TELEPHONE ENCOUNTER
----- Message from Linnea Ventura sent at 1/6/2020  1:41 PM CST -----  Contact: patient  Type: Needs Medical Advice    Who Called:  patient  Best Call Back Number: [phone]  Additional Information: Patient is out of medication and would like to schedule apt. For tomorrow and speak with the nurse.  Please call to advise and schedule.  Thanks!

## 2020-01-07 ENCOUNTER — DOCUMENTATION ONLY (OUTPATIENT)
Dept: FAMILY MEDICINE | Facility: CLINIC | Age: 85
End: 2020-01-07

## 2020-01-07 ENCOUNTER — TELEPHONE (OUTPATIENT)
Dept: FAMILY MEDICINE | Facility: CLINIC | Age: 85
End: 2020-01-07

## 2020-01-07 NOTE — TELEPHONE ENCOUNTER
----- Message from Diogo Johnston sent at 1/7/2020  1:09 PM CST -----  Contact: patient  Type: Needs Medical Advice    Who Called:  Patient  Symptoms (please be specific):    How long has patient had these symptoms:    Pharmacy name and phone #:    Best Call Back Number: [phone]  Additional Information: patient has appointment for today,stated not feeling well enough to come in,requesting a call back from [place] to reschedule appointment,next available 01/21

## 2020-01-08 ENCOUNTER — OFFICE VISIT (OUTPATIENT)
Dept: FAMILY MEDICINE | Facility: CLINIC | Age: 85
End: 2020-01-08
Payer: MEDICARE

## 2020-01-08 VITALS
OXYGEN SATURATION: 93 % | WEIGHT: 94.38 LBS | DIASTOLIC BLOOD PRESSURE: 66 MMHG | BODY MASS INDEX: 17.82 KG/M2 | HEART RATE: 82 BPM | HEIGHT: 61 IN | SYSTOLIC BLOOD PRESSURE: 118 MMHG | TEMPERATURE: 98 F | RESPIRATION RATE: 16 BRPM

## 2020-01-08 DIAGNOSIS — G25.2 INTENTION TREMOR: ICD-10-CM

## 2020-01-08 DIAGNOSIS — I50.9 HEART FAILURE, UNSPECIFIED HF CHRONICITY, UNSPECIFIED HEART FAILURE TYPE: ICD-10-CM

## 2020-01-08 DIAGNOSIS — R53.81 DEBILITY: Primary | ICD-10-CM

## 2020-01-08 DIAGNOSIS — R29.898 RIGHT ARM WEAKNESS: ICD-10-CM

## 2020-01-08 DIAGNOSIS — G47.00 INSOMNIA, UNSPECIFIED TYPE: ICD-10-CM

## 2020-01-08 PROCEDURE — 99499 UNLISTED E&M SERVICE: CPT | Mod: S$GLB,,, | Performed by: INTERNAL MEDICINE

## 2020-01-08 PROCEDURE — 1101F PT FALLS ASSESS-DOCD LE1/YR: CPT | Mod: CPTII,S$GLB,, | Performed by: INTERNAL MEDICINE

## 2020-01-08 PROCEDURE — 99214 PR OFFICE/OUTPT VISIT, EST, LEVL IV, 30-39 MIN: ICD-10-PCS | Mod: S$GLB,,, | Performed by: INTERNAL MEDICINE

## 2020-01-08 PROCEDURE — 1126F AMNT PAIN NOTED NONE PRSNT: CPT | Mod: S$GLB,,, | Performed by: INTERNAL MEDICINE

## 2020-01-08 PROCEDURE — 1159F MED LIST DOCD IN RCRD: CPT | Mod: S$GLB,,, | Performed by: INTERNAL MEDICINE

## 2020-01-08 PROCEDURE — 1126F PR PAIN SEVERITY QUANTIFIED, NO PAIN PRESENT: ICD-10-PCS | Mod: S$GLB,,, | Performed by: INTERNAL MEDICINE

## 2020-01-08 PROCEDURE — 99499 RISK ADDL DX/OHS AUDIT: ICD-10-PCS | Mod: S$GLB,,, | Performed by: INTERNAL MEDICINE

## 2020-01-08 PROCEDURE — 1101F PR PT FALLS ASSESS DOC 0-1 FALLS W/OUT INJ PAST YR: ICD-10-PCS | Mod: CPTII,S$GLB,, | Performed by: INTERNAL MEDICINE

## 2020-01-08 PROCEDURE — 99214 OFFICE O/P EST MOD 30 MIN: CPT | Mod: S$GLB,,, | Performed by: INTERNAL MEDICINE

## 2020-01-08 PROCEDURE — 1159F PR MEDICATION LIST DOCUMENTED IN MEDICAL RECORD: ICD-10-PCS | Mod: S$GLB,,, | Performed by: INTERNAL MEDICINE

## 2020-01-08 RX ORDER — ALBUTEROL SULFATE 90 UG/1
2 AEROSOL, METERED RESPIRATORY (INHALATION) EVERY 6 HOURS PRN
Qty: 1 EACH | Refills: 11 | Status: SHIPPED | OUTPATIENT
Start: 2020-01-08 | End: 2020-11-30 | Stop reason: SDUPTHER

## 2020-01-08 RX ORDER — CLONAZEPAM 0.5 MG/1
TABLET ORAL
Qty: 6 TABLET | Refills: 2 | Status: SHIPPED | OUTPATIENT
Start: 2020-01-08 | End: 2020-03-20 | Stop reason: SDUPTHER

## 2020-01-08 NOTE — PATIENT INSTRUCTIONS
Cool mist vaporizer for hoarseness.    Thank you for choosing [facility].     Please fill out the patient experience survey.
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
## 2020-03-16 DIAGNOSIS — G47.00 INSOMNIA, UNSPECIFIED TYPE: ICD-10-CM

## 2020-03-16 DIAGNOSIS — G25.2 INTENTION TREMOR: ICD-10-CM

## 2020-03-16 RX ORDER — CLONAZEPAM 0.5 MG/1
TABLET ORAL
Qty: 6 TABLET | Refills: 0 | OUTPATIENT
Start: 2020-03-16

## 2020-03-16 NOTE — TELEPHONE ENCOUNTER
----- Message from Antonette Hart sent at 3/16/2020  2:00 PM CDT -----  Contact: self   Patient want to know if you can send a hlf of supply until she see doctor on clonazePAM 0.5 MG please send to [facility] drug [employer], any questions please call back at [phone] (home)     Case number [id number]  [id number] - [place], [place]  [facility] & [address] [address]  Phone: [phone] Fax: [phone]

## 2020-03-19 ENCOUNTER — TELEPHONE (OUTPATIENT)
Dept: FAMILY MEDICINE | Facility: CLINIC | Age: 85
End: 2020-03-19

## 2020-03-19 DIAGNOSIS — G47.00 INSOMNIA, UNSPECIFIED TYPE: ICD-10-CM

## 2020-03-19 DIAGNOSIS — G25.2 INTENTION TREMOR: ICD-10-CM

## 2020-03-19 NOTE — TELEPHONE ENCOUNTER
Patient is concerned about coming for her visit tomorrow.Are you willing to send her clonazepam to the pharmacy or do you want her to come in.

## 2020-03-20 ENCOUNTER — TELEPHONE (OUTPATIENT)
Dept: FAMILY MEDICINE | Facility: CLINIC | Age: 85
End: 2020-03-20

## 2020-03-20 RX ORDER — CLONAZEPAM 0.5 MG/1
TABLET ORAL
Qty: 6 TABLET | Refills: 2 | Status: SHIPPED | OUTPATIENT
Start: 2020-03-20 | End: 2020-06-02 | Stop reason: SDUPTHER

## 2020-03-20 NOTE — TELEPHONE ENCOUNTER
----- Message from Ayanna Veliz sent at 3/19/2020  2:46 PM CDT -----  Contact: patient  Type: Needs Medical Advice    Who Called:  Patient  Pharmacy name and phone # [facility]  Best Call Back Number:[phone] (home)   Additional Information: the pt said she needs the Rx Clonazepam 0.5mg called in today she said she is out and would like to pick them up today. Pt said she needs enough to last a month for at least a 90 day supply.

## 2020-03-20 NOTE — TELEPHONE ENCOUNTER
Notified patient that she doesn't need to come in today and Dr Mart is sending her refills to pharmacy.

## 2020-04-02 NOTE — TELEPHONE ENCOUNTER
----- Message from Yumiko Tripathi sent at 4/2/2020 12:34 PM CDT -----    Type:  RX Refill Request    Who Called:  pt  Refill   RX Name and Strength:  albuterol  viles 0.5  How is the patient currently taking it? (ex. 1XDay):  Every  Six  hours  Is this a 30 day or 90 day RX:  90 day  Preferred Pharmacy with phone number:    [facility] #[id number] [address] & [address] [address]  Phone: [phone] Fax: [phone]  Best Call Back Number:  [phone]

## 2020-04-03 ENCOUNTER — TELEPHONE (OUTPATIENT)
Dept: FAMILY MEDICINE | Facility: CLINIC | Age: 85
End: 2020-04-03

## 2020-04-03 RX ORDER — IPRATROPIUM BROMIDE AND ALBUTEROL SULFATE 2.5; .5 MG/3ML; MG/3ML
SOLUTION RESPIRATORY (INHALATION)
Qty: 3420 ML | Refills: 5 | Status: SHIPPED | OUTPATIENT
Start: 2020-04-03

## 2020-04-03 NOTE — TELEPHONE ENCOUNTER
----- Message from Princess ANTONIO Goldberg sent at 4/3/2020  4:27 PM CDT -----  Contact: pt  Type:  RX Refill Request    Who Called:  Patient  Refill or New Rx: Refill  RX Name and Strength:  albuterol-ipratropium (DUO-NEB) 2.5 mg-0.5 mg/3 mL nebulizer solution  How is the patient currently taking it? (ex. 1XDay):   USE 3 ML VIA NEBULIZER EVERY 6 HOURS AS NEEDED FOR WHEEZING  Is this a 30 day or 90 day RX:  3420  Preferred Pharmacy with phone number:    [facility] #[id number] [address] & [address] [address]  Phone: [phone] Fax: [phone]  Local or Mail Order:  Local  Ordering Provider:  Dr Barron Day Call Back Number:    Additional Information:

## 2020-05-28 DIAGNOSIS — G47.00 INSOMNIA, UNSPECIFIED TYPE: ICD-10-CM

## 2020-05-28 DIAGNOSIS — G25.2 INTENTION TREMOR: ICD-10-CM

## 2020-05-28 RX ORDER — CLONAZEPAM 0.5 MG/1
TABLET ORAL
Qty: 6 TABLET | Refills: 2 | OUTPATIENT
Start: 2020-05-28

## 2020-06-01 ENCOUNTER — TELEPHONE (OUTPATIENT)
Dept: FAMILY MEDICINE | Facility: CLINIC | Age: 85
End: 2020-06-01

## 2020-06-01 DIAGNOSIS — G47.00 INSOMNIA, UNSPECIFIED TYPE: ICD-10-CM

## 2020-06-01 DIAGNOSIS — G25.2 INTENTION TREMOR: ICD-10-CM

## 2020-06-01 RX ORDER — CLONAZEPAM 0.5 MG/1
TABLET ORAL
Qty: 6 TABLET | OUTPATIENT
Start: 2020-06-01

## 2020-06-01 NOTE — TELEPHONE ENCOUNTER
Patient states her son has to bring her and he can only do it Wednesday.She gave me Her sons phone number to schedule a good time for him.I left a message on his phone to call the office.

## 2020-06-01 NOTE — TELEPHONE ENCOUNTER
----- Message from Ayanna Veliz sent at 6/1/2020  3:29 PM CDT -----  Contact: patient  Type: Needs Medical Advice  Who Called:  Patient  Best Call Back Number: [phone] (home)   Additional Information: The pt said she needs an appt today please call her to advise thanks.

## 2020-06-01 NOTE — TELEPHONE ENCOUNTER
----- Message from Araceli Guerra sent at 6/1/2020  1:30 PM CDT -----  Contact: self  Type:  Patient Returning Call    Who Called:  self  Who Left Message for Patient:  Alexa  Does the patient know what this is regarding?:  yes  Best Call Back Number:  [phone] (home)   Additional Information:  Patient states it is regarding an appointment so she can get her medication. Please call patient. Thanks!

## 2020-06-01 NOTE — TELEPHONE ENCOUNTER
----- Message from Antonette Hart sent at 6/1/2020 10:42 AM CDT -----  Contact: self  Placed call to pod, patient miss call from office please call back at [phone] (home)     Case number [id number]

## 2020-06-01 NOTE — TELEPHONE ENCOUNTER
----- Message from Johanna Benjamin sent at 6/1/2020 12:15 PM CDT -----  Type:  Patient Returning Call    Who Called: self Who Left Message for Patient:   Does the patient know what this is regarding?: rx clonazepam   Best Call Back Number: [phone]   Additional Information:

## 2020-06-01 NOTE — TELEPHONE ENCOUNTER
----- Message from Antonette Hart sent at 6/1/2020  3:50 PM CDT -----  Contact: self   Patient want to speak with office regarding scheduling appointment for patient please call back at [phone] (home) patient only can come certain days

## 2020-06-01 NOTE — TELEPHONE ENCOUNTER
----- Message from Dimas Sow sent at 6/1/2020 11:55 AM CDT -----  Contact: pt  Type: Needs Medical Advice    Who Called:  pt    Best Call Back Number: [phone]  Additional Information: pt needs the following medications refilled:  albuterol (PROVENTIL/VENTOLIN HFA) 90 mcg/actuation inhaler  clonazePAM (KLONOPIN) 0.5 MG tablet    [facility] [address] [place] [address] & [address] [address]  Phone: [phone] Fax: [phone]

## 2020-06-01 NOTE — TELEPHONE ENCOUNTER
Spoke to patient.  Needs Klonopin refill.  Can only come in in the afternoon when son can bring her.

## 2020-06-02 ENCOUNTER — TELEPHONE (OUTPATIENT)
Dept: FAMILY MEDICINE | Facility: CLINIC | Age: 85
End: 2020-06-02

## 2020-06-02 ENCOUNTER — OFFICE VISIT (OUTPATIENT)
Dept: FAMILY MEDICINE | Facility: CLINIC | Age: 85
End: 2020-06-02
Payer: MEDICARE

## 2020-06-02 VITALS
BODY MASS INDEX: 16.27 KG/M2 | TEMPERATURE: 98 F | RESPIRATION RATE: 16 BRPM | WEIGHT: 86.19 LBS | SYSTOLIC BLOOD PRESSURE: 118 MMHG | HEIGHT: 61 IN | DIASTOLIC BLOOD PRESSURE: 64 MMHG | OXYGEN SATURATION: 91 % | HEART RATE: 89 BPM

## 2020-06-02 DIAGNOSIS — J45.909 MODERATE ASTHMA, UNSPECIFIED WHETHER COMPLICATED, UNSPECIFIED WHETHER PERSISTENT: ICD-10-CM

## 2020-06-02 DIAGNOSIS — B35.4 TINEA CORPORIS: ICD-10-CM

## 2020-06-02 DIAGNOSIS — G25.2 INTENTION TREMOR: ICD-10-CM

## 2020-06-02 DIAGNOSIS — J45.20 MILD INTERMITTENT INTRINSIC ASTHMA WITHOUT COMPLICATION: ICD-10-CM

## 2020-06-02 DIAGNOSIS — G47.00 INSOMNIA, UNSPECIFIED TYPE: ICD-10-CM

## 2020-06-02 DIAGNOSIS — R63.4 WEIGHT LOSS: Primary | ICD-10-CM

## 2020-06-02 DIAGNOSIS — Z12.31 ENCOUNTER FOR SCREENING MAMMOGRAM FOR MALIGNANT NEOPLASM OF BREAST: ICD-10-CM

## 2020-06-02 PROCEDURE — 1126F PR PAIN SEVERITY QUANTIFIED, NO PAIN PRESENT: ICD-10-PCS | Mod: S$GLB,,, | Performed by: INTERNAL MEDICINE

## 2020-06-02 PROCEDURE — 1159F MED LIST DOCD IN RCRD: CPT | Mod: S$GLB,,, | Performed by: INTERNAL MEDICINE

## 2020-06-02 PROCEDURE — 99214 OFFICE O/P EST MOD 30 MIN: CPT | Mod: S$GLB,,, | Performed by: INTERNAL MEDICINE

## 2020-06-02 PROCEDURE — 1101F PR PT FALLS ASSESS DOC 0-1 FALLS W/OUT INJ PAST YR: ICD-10-PCS | Mod: CPTII,S$GLB,, | Performed by: INTERNAL MEDICINE

## 2020-06-02 PROCEDURE — 1101F PT FALLS ASSESS-DOCD LE1/YR: CPT | Mod: CPTII,S$GLB,, | Performed by: INTERNAL MEDICINE

## 2020-06-02 PROCEDURE — 99214 PR OFFICE/OUTPT VISIT, EST, LEVL IV, 30-39 MIN: ICD-10-PCS | Mod: S$GLB,,, | Performed by: INTERNAL MEDICINE

## 2020-06-02 PROCEDURE — 1159F PR MEDICATION LIST DOCUMENTED IN MEDICAL RECORD: ICD-10-PCS | Mod: S$GLB,,, | Performed by: INTERNAL MEDICINE

## 2020-06-02 PROCEDURE — 1126F AMNT PAIN NOTED NONE PRSNT: CPT | Mod: S$GLB,,, | Performed by: INTERNAL MEDICINE

## 2020-06-02 RX ORDER — BUSPIRONE HYDROCHLORIDE 10 MG/1
10 TABLET ORAL 2 TIMES DAILY
Qty: 60 TABLET | Refills: 11 | Status: SHIPPED | OUTPATIENT
Start: 2020-06-02 | End: 2021-06-02

## 2020-06-02 RX ORDER — MIRTAZAPINE 7.5 MG/1
7.5 TABLET, FILM COATED ORAL NIGHTLY
Qty: 30 TABLET | Refills: 11 | Status: SHIPPED | OUTPATIENT
Start: 2020-06-02 | End: 2020-12-23

## 2020-06-02 RX ORDER — PRENATAL VIT 91/IRON/FOLIC/DHA 28-975-200
COMBINATION PACKAGE (EA) ORAL 2 TIMES DAILY
Refills: 0 | COMMUNITY
Start: 2020-06-02 | End: 2020-12-23

## 2020-06-02 RX ORDER — PRIMIDONE 50 MG/1
125 TABLET ORAL 3 TIMES DAILY
Qty: 270 TABLET | Refills: 11 | Status: SHIPPED | OUTPATIENT
Start: 2020-06-02 | End: 2020-08-31 | Stop reason: SDUPTHER

## 2020-06-02 RX ORDER — CLONAZEPAM 0.5 MG/1
TABLET ORAL
Qty: 8 TABLET | Refills: 2 | Status: SHIPPED | OUTPATIENT
Start: 2020-06-02 | End: 2020-08-31 | Stop reason: SDUPTHER

## 2020-06-02 RX ORDER — BUDESONIDE AND FORMOTEROL FUMARATE DIHYDRATE 80; 4.5 UG/1; UG/1
AEROSOL RESPIRATORY (INHALATION)
Qty: 10.2 G | Refills: 5 | Status: SHIPPED | OUTPATIENT
Start: 2020-06-02

## 2020-06-02 NOTE — PATIENT INSTRUCTIONS
Stop atenolol    Thank you for choosing [facility].     Please fill out the patient experience survey.

## 2020-06-02 NOTE — TELEPHONE ENCOUNTER
----- Message from Jess Zelaya sent at 6/2/2020  7:59 AM CDT -----  Type:  RX Refill Request    Who Called:  Patient  RX Name and Strength:  clonazePAM (KLONOPIN) 0.5 MG tablet  Preferred Pharmacy with phone number:  [facility] Call Back Number:  [phone]  Additional Information:  Please call patient back to advise.

## 2020-06-02 NOTE — PROGRESS NOTES
Subjective:      2:02 PM     Patient ID: Jennifer Diaz is a 87 y.o. female.    Chief Complaint: Tremors (refills)    HPI         CHIEF COMPLAINT: weight loss  HPI:     ONSET/TIMING: Onset      3 months     ago.  Gradual.  Similar problems in past: no .     DURATION:       QUALITY/COURSE:  unchanged     LOCATION:     INTENSITY/SEVERITY:  8 lb but the patient was already under weight..Severity is #   5    (10 point scale).      CONTEXT/WHEN: .decreased appetite: yes.  . Alcohol abuse: no .    MODIFIERS/TREATMENTS: .      SYMPTOMS/RELATED:  Possible medication side effects include:    .    The following symptoms are positive if BOLD, negative otherwise.       REVIEW OF SYMPTOMS: . Anorexia.  Stress. Night sweats .Lymphadenopathy.  . Tick_Bites.   . Loss_of_concentration. Loss_of_intersts  Wt Readings from Last 1 Encounters:   06/02/20 1352 39.1 kg (86 lb 3.2 oz)       Lab Results   Component Value Date    WBC 4.54 08/28/2019    HGB 13.2 08/28/2019    HCT 42.7 08/28/2019     08/28/2019    ALT 22 02/23/2017    AST 41 (H) 02/23/2017     08/28/2019    K 4.4 08/28/2019    CL 99 08/28/2019    CREATININE 0.6 08/28/2019    BUN 19 08/28/2019    CO2 30 (H) 08/28/2019    TSH 2.133 02/23/2017    INR 1.1 02/06/2017    HGBA1C 5.8 02/07/2017             CHIEF COMPLAINT: insomnia .  HPI:  Still requires quarter of a 0.5 mg Xanax to go to sleep.    ONSET/TIMING: Onset         ago. Sudden: no .  Similar problems in past: no. ..    DURATION:  Intermittent    QUALITY/COURSE: .unchanged.     Can't fall asleep: yes. . Wakes up early: yes.      INTENSITY/SEVERITY:  Severity 4   (on a 1-10 scale).       MODIFIERS/TREATMENTS: . Taking medications: yes.  . Effective: yes.  SYMPTOMS/RELATED: . Possible medication side effects include:     .      The following symptoms/statements  are positive if BOLD, negative otherwise.       CONTEXT/WHEN:  .Nocturnal. . Napping.  shift work . Time_zone_changes.     REVIEW OF SYSTEMS :  .  "  nocturia . Restless_legs . pain . Obstructive_sleep_apnea  . depression . anxiety . Substance_abuse . Copd . Grief .         CHIEF COMPLAINT: Rash  HPI:     ONSET/TIMING: Onset       1 month    ago. Sudden: no.. Work related: no. Similar_problems_in_the_past: no.    DURATION:  Continuous..    QUALITY/COURSE:   unchanged  .     LOCATION:    Face .     INTENSITY/SEVERITY:  Severity is #  3   (10 point scale).    CONTEXT/WHEN: .--Similar problems: no . .  Past treatments: none  . Exposure_to_others_with_similar_symptoms: no . . Exposure_to_poison _ivy: no. .   New exposures (soaps, lotions, laundry detergents, foods, medications, plants, insects or animals). no    SYMPTOMS/RELATED: .--Possible medication side effect:    The following symptoms are positive if BOLD, negative otherwise.     REVIEW OF SYMPTOMS:  Itching.  Pain. Sharp_pain. Dull_pain. Burning_pain.  Erythema-Skin. Hypopigmentation.  hyperpigmentation . Inflammation. Herald_Patch.. fixed . evanescent.  Blisters. Purulence. Fever. Fatigue. Tick_Bites.               Review of Systems      Objective:      Vitals:    06/02/20 1352   BP: 118/64   Pulse: 89   Resp: 16   Temp: 98.2 °F (36.8 °C)   TempSrc: Oral   SpO2: (!) 91%   Weight: 39.1 kg (86 lb 3.2 oz)   Height: 5' 1" (1.549 m)   PainSc: 0-No pain     Physical Exam   Constitutional: She appears well-developed and well-nourished.   Cachectic   Cardiovascular: Normal rate, regular rhythm and normal heart sounds.   Pulmonary/Chest: Effort normal and breath sounds normal.   Abdominal: Soft. There is no tenderness.   Neurological: She is alert.   Intention tremor of the right hand only   Psychiatric: She has a normal mood and affect. Her behavior is normal. Thought content normal.   Nursing note and vitals reviewed.    No results found for this or any previous visit (from the past 1008 hour(s)).       Assessment:       1. Weight loss    2. 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 Intention " tremor    4. Insomnia, unspecified type    5. Tinea corporis    6. Encounter for screening mammogram for malignant neoplasm of breast     7. Moderate asthma, unspecified whether complicated, unspecified whether persistent          Plan:       Weight loss  -     mirtazapine (REMERON) 7.5 MG Tab; Take 1 tablet (7.5 mg total) by mouth every evening.  Dispense: 30 tablet; Refill: 11  -     CBC auto differential; Future; Expected date: 06/02/2020  -     Comprehensive metabolic panel; Future; Expected date: 06/02/2020  -     TSH; Future; Expected date: 06/02/2020  -     Rapid HIV; Future; Expected date: 06/02/2020  -     Ambulatory referral/consult to Gynecology; Future; Expected date: 06/09/2020  -     Ambulatory referral/consult to Gastroenterology; Future; Expected date: 06/09/2020  -     X-Ray Chest PA And Lateral; Future; Expected date: 06/02/2020  -     US Abdomen Complete; Future; Expected date: 06/02/2020  -     Mammo Digital Screening Bilat; Future; Expected date: 06/02/2020    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  -     budesonide-formoterol 80-4.5 mcg (SYMBICORT) 80-4.5 mcg/actuation HFAA; INHALE 2 PUFFS INTO THE LUNGS EVERY DAY  Dispense: 10.2 g; Refill: 5    Intention tremor  -     busPIRone (BUSPAR) 10 MG tablet; Take 1 tablet (10 mg total) by mouth 2 (two) times daily.  Dispense: 60 tablet; Refill: 11  -     clonazePAM (KLONOPIN) 0.5 MG tablet; 0.125 mg po every bedtime  Dispense: 8 tablet; Refill: 2  -     primidone (MYSOLINE) 50 MG Tab; Take 3 tablets (150 mg total) by mouth 3 (three) times daily.  Dispense: 270 tablet; Refill: 11    Insomnia, unspecified type  -     clonazePAM (KLONOPIN) 0.5 MG tablet; 0.125 mg po every bedtime  Dispense: 8 tablet; Refill: 2    Tinea corporis  -     terbinafine HCL (LAMISIL) 1 % cream; Apply topically 2 (two) times daily.; Refill: 0    Encounter for screening mammogram for malignant neoplasm of breast   -     Mammo Digital Screening Bilat; Future;  Expected date: 06/02/2020    Moderate asthma, unspecified whether complicated, unspecified whether persistent  -     NEBULIZER FOR HOME USE      Follow up in about 3 months (around 9/2/2020).

## 2020-06-11 ENCOUNTER — TELEPHONE (OUTPATIENT)
Dept: FAMILY MEDICINE | Facility: CLINIC | Age: 85
End: 2020-06-11

## 2020-06-11 NOTE — TELEPHONE ENCOUNTER
----- Message from Princess ANTONIO Goldberg sent at 6/11/2020  1:26 PM CDT -----  Contact: pt  Type: Needs Medical Advice  Who Called:  pt  Best Call Back Number: [phone] (home)     Additional Information: Patient would like a medication list she has lost her other one.

## 2020-08-24 ENCOUNTER — TELEPHONE (OUTPATIENT)
Dept: FAMILY MEDICINE | Facility: CLINIC | Age: 85
End: 2020-08-24

## 2020-08-24 NOTE — TELEPHONE ENCOUNTER
----- Message from Vijay Szymanski sent at 8/24/2020  1:22 PM CDT -----  Type: Needs Medical Advice  Who Called: Patient    [id number] [place] [address] [facility] & [address] [address]  Phone: [phone] Fax: [phone]        Best Call Back Number [phone]  Additional Information: Patient states that she has been waiting for a callback from Alexa regarding a refill for clonazePAM (KLONOPIN) 0.5 MG tablet

## 2020-08-24 NOTE — TELEPHONE ENCOUNTER
----- Message from Ivanna Dumont sent at 8/24/2020 12:00 PM CDT -----  Regarding: advice  Contact: Patient/[phone] (home)  Type: Needs Medical Advice  Who Called:  Patient/[phone] (home)     Additional Information: Only wants to speak to Alexa concerning one of her medications and scheduling her appointment that she canceled today. She would not hear of me scheduling. Please call to advise.

## 2020-08-27 ENCOUNTER — TELEPHONE (OUTPATIENT)
Dept: FAMILY MEDICINE | Facility: CLINIC | Age: 85
End: 2020-08-27

## 2020-08-27 NOTE — TELEPHONE ENCOUNTER
----- Message from Anusha Becker sent at 8/27/2020  2:05 PM CDT -----  Pt called stating that she needs to reschedule her appointment to 9/4 or 9/7 in the morning please reach out to pt at [phone]

## 2020-08-31 ENCOUNTER — OFFICE VISIT (OUTPATIENT)
Dept: FAMILY MEDICINE | Facility: CLINIC | Age: 85
End: 2020-08-31
Payer: MEDICARE

## 2020-08-31 VITALS
HEIGHT: 61 IN | HEART RATE: 79 BPM | SYSTOLIC BLOOD PRESSURE: 106 MMHG | WEIGHT: 85.75 LBS | TEMPERATURE: 98 F | DIASTOLIC BLOOD PRESSURE: 64 MMHG | BODY MASS INDEX: 16.19 KG/M2 | RESPIRATION RATE: 16 BRPM | OXYGEN SATURATION: 95 %

## 2020-08-31 DIAGNOSIS — G25.2 INTENTION TREMOR: ICD-10-CM

## 2020-08-31 DIAGNOSIS — G47.00 INSOMNIA, UNSPECIFIED TYPE: ICD-10-CM

## 2020-08-31 DIAGNOSIS — J45.20 MILD INTERMITTENT INTRINSIC ASTHMA WITHOUT COMPLICATION: ICD-10-CM

## 2020-08-31 DIAGNOSIS — R63.4 WEIGHT LOSS: Primary | ICD-10-CM

## 2020-08-31 PROCEDURE — 1126F AMNT PAIN NOTED NONE PRSNT: CPT | Mod: S$GLB,,, | Performed by: INTERNAL MEDICINE

## 2020-08-31 PROCEDURE — 1159F MED LIST DOCD IN RCRD: CPT | Mod: S$GLB,,, | Performed by: INTERNAL MEDICINE

## 2020-08-31 PROCEDURE — 1159F PR MEDICATION LIST DOCUMENTED IN MEDICAL RECORD: ICD-10-PCS | Mod: S$GLB,,, | Performed by: INTERNAL MEDICINE

## 2020-08-31 PROCEDURE — 1101F PR PT FALLS ASSESS DOC 0-1 FALLS W/OUT INJ PAST YR: ICD-10-PCS | Mod: CPTII,S$GLB,, | Performed by: INTERNAL MEDICINE

## 2020-08-31 PROCEDURE — 1126F PR PAIN SEVERITY QUANTIFIED, NO PAIN PRESENT: ICD-10-PCS | Mod: S$GLB,,, | Performed by: INTERNAL MEDICINE

## 2020-08-31 PROCEDURE — 99213 OFFICE O/P EST LOW 20 MIN: CPT | Mod: S$GLB,,, | Performed by: INTERNAL MEDICINE

## 2020-08-31 PROCEDURE — 1101F PT FALLS ASSESS-DOCD LE1/YR: CPT | Mod: CPTII,S$GLB,, | Performed by: INTERNAL MEDICINE

## 2020-08-31 PROCEDURE — 99499 RISK ADDL DX/OHS AUDIT: ICD-10-PCS | Mod: S$GLB,,, | Performed by: INTERNAL MEDICINE

## 2020-08-31 PROCEDURE — 99213 PR OFFICE/OUTPT VISIT, EST, LEVL III, 20-29 MIN: ICD-10-PCS | Mod: S$GLB,,, | Performed by: INTERNAL MEDICINE

## 2020-08-31 PROCEDURE — 99499 UNLISTED E&M SERVICE: CPT | Mod: S$GLB,,, | Performed by: INTERNAL MEDICINE

## 2020-08-31 RX ORDER — CLONAZEPAM 0.5 MG/1
TABLET ORAL
Qty: 8 TABLET | Refills: 2 | Status: SHIPPED | OUTPATIENT
Start: 2020-08-31 | End: 2020-12-23 | Stop reason: SDUPTHER

## 2020-08-31 RX ORDER — PRIMIDONE 50 MG/1
150 TABLET ORAL 3 TIMES DAILY
Qty: 270 TABLET | Refills: 11 | Status: SHIPPED | OUTPATIENT
Start: 2020-08-31 | End: 2020-12-23

## 2020-08-31 NOTE — PATIENT INSTRUCTIONS
Please get labs, ulrasound and x-ray.      Thank you for choosing [facility].     Please fill out the patient experience survey.

## 2020-08-31 NOTE — PROGRESS NOTES
"Subjective:      8:28 AM     Patient ID: Jennifer Diaz is a 87 y.o. female.    Chief Complaint: Tremors (refills)    HPI     Tremors are much better on primadone.     Sleeping ok  On  Klonopin 0.125 mg qha.       Weight  Loss  Has  Stabilized  But no workup was done, it  Was ordered.     She denies wheezing.     Review of Systems      Objective:      Vitals:    08/31/20 0820   BP: 106/64   Pulse: 79   Resp: 16   Temp: 97.9 °F (36.6 °C)   TempSrc: Oral   SpO2: 95%   Weight: 38.9 kg (85 lb 12.1 oz)   Height: 5' 1" (1.549 m)   PainSc: 0-No pain     Physical Exam  Vitals signs and nursing note reviewed.   Constitutional:       Appearance: She is well-developed.   Cardiovascular:      Rate and Rhythm: Normal rate and regular rhythm.      Heart sounds: Normal heart sounds.   Pulmonary:      Effort: Pulmonary effort is normal.      Breath sounds: Normal breath sounds.   Abdominal:      Palpations: Abdomen is soft.      Tenderness: There is no abdominal tenderness.   Neurological:      Mental Status: She is alert.   Psychiatric:         Behavior: Behavior normal.         Thought Content: Thought content normal.       No results found for this or any previous visit (from the past 1008 hour(s)).       Assessment:       1. Weight loss    2. Intention tremor    3. Insomnia, unspecified type    4. 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          Plan:       Weight loss    Intention tremor  -     primidone (MYSOLINE) 50 MG Tab; Take 3 tablets (150 mg total) by mouth 3 (three) times daily.  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      "

## 2020-09-17 NOTE — TELEPHONE ENCOUNTER
----- Message from Jess Chavez sent at 6/2/2020  8:23 AM CDT -----  Type:  Same Day Appointment Request    Caller is requesting a same day appointment.      Name of Caller:  Jeronimo - son  When is the first available appointment?  06/19/2020 with Dr. Mart  Symptoms:  Needs refills on  Klonopin  Best Call Back Number:  [phone]  Additional Information:   Contact  Son Jeronimo only about the appointment son, will be bring patient.  Caller states patient can do a virtual visit if needed      
Made patient an appointment.Son was upset.I notified him that I tried to call him to schedule patient an appointment asap.I left a message.we talked about everything and worked it out.He will make sure patient gets an appointment before she is out of medications.  
Universal Safety Interventions

## 2020-11-16 ENCOUNTER — TELEPHONE (OUTPATIENT)
Dept: FAMILY MEDICINE | Facility: CLINIC | Age: 85
End: 2020-11-16

## 2020-11-16 NOTE — TELEPHONE ENCOUNTER
----- Message from Sabina Mcdaniel sent at 11/16/2020  3:13 PM CST -----  Regarding: pharmacy call  Contact: Mandie      Type:  Pharmacy Calling to Clarify an RX    Name of Caller:  Mandie  Pharmacy Name:  [facility]  Prescription Name:  propanalog 20mg  What do they need to clarify?:  2 refill request sent  Best Call Back Number:  [phone]  Additional Information:  2 request sent over no response, please call

## 2020-11-18 ENCOUNTER — TELEPHONE (OUTPATIENT)
Dept: FAMILY MEDICINE | Facility: CLINIC | Age: 85
End: 2020-11-18

## 2020-11-18 NOTE — TELEPHONE ENCOUNTER
----- Message from Thaddeus Chávez sent at 11/18/2020 10:14 AM CST -----  Regarding: Refill  Contact: Charlene/[facility]  Charlene called in and stated patient informed them that Dr. Mart had filled for her in the past a Rx for Propranolol 20 mg and patient is requesting a refill?        [facility]  Phone Number: [phone]

## 2020-11-19 NOTE — TELEPHONE ENCOUNTER
Patient says that she doesn't want to take the primidone.She says that she takes the propanolol and doesn't think she takes the primidone.She says she doesn't know why you would have changed it when the propanolol works.Will you send in a refill of the propanolol?She said she cant make it in right now to discuss.

## 2020-11-20 NOTE — TELEPHONE ENCOUNTER
She had asked for the cardiologist to manage the propanolol which is reasonable.  That is how we ended up giving   Her the primidone.  I would like to know what happened with the cardiologist before I switch her back.

## 2020-11-23 RX ORDER — PROPRANOLOL HYDROCHLORIDE 20 MG/1
20 TABLET ORAL 2 TIMES DAILY
Qty: 60 TABLET | Refills: 11 | Status: SHIPPED | OUTPATIENT
Start: 2020-11-23 | End: 2020-11-30 | Stop reason: SDUPTHER
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
## 2020-11-27 ENCOUNTER — TELEPHONE (OUTPATIENT)
Dept: FAMILY MEDICINE | Facility: CLINIC | Age: 85
End: 2020-11-27

## 2020-11-27 NOTE — TELEPHONE ENCOUNTER
----- Message from Brandy Cancino sent at 11/27/2020 11:50 AM CST -----  Regarding: refill  Contact: [phone]  Type:  RX Refill Request    Who Called:jodi   Refill or New Rx:refill   RX Name and Strength:propranoloL (INDERAL) 20 MG tablet 60 tablet   How is the patient currently taking it? (ex. 1XDay):  Is this a 30 day or 90 day RX:  Preferred Pharmacy with phone number:[facility] [address] [facility] [phone] (Phone)  [phone] (Fax)  Local or Mail Order:local   Ordering Provider:  Would the patient rather a call back or a response via [facility]? Call back  Best Call Back Number:[phone]  Additional Information:

## 2020-11-27 NOTE — TELEPHONE ENCOUNTER
[facility]ol [facility] [address] [facility] of [facility].i left a message for patient to call the office.i [facility] see [facility]

## 2020-11-30 ENCOUNTER — TELEPHONE (OUTPATIENT)
Dept: FAMILY MEDICINE | Facility: CLINIC | Age: 85
End: 2020-11-30

## 2020-11-30 DIAGNOSIS — I50.9 HEART FAILURE, UNSPECIFIED HF CHRONICITY, UNSPECIFIED HEART FAILURE TYPE: ICD-10-CM

## 2020-11-30 RX ORDER — PROPRANOLOL HYDROCHLORIDE 20 MG/1
20 TABLET ORAL 2 TIMES DAILY
Qty: 60 TABLET | Refills: 11 | Status: SHIPPED | OUTPATIENT
Start: 2020-11-30 | End: 2021-11-30

## 2020-11-30 RX ORDER — ALBUTEROL SULFATE 90 UG/1
2 AEROSOL, METERED RESPIRATORY (INHALATION) EVERY 6 HOURS PRN
Qty: 18 G | Refills: 5 | Status: SHIPPED | OUTPATIENT
Start: 2020-11-30 | End: 2021-01-27 | Stop reason: SDUPTHER

## 2020-11-30 NOTE — TELEPHONE ENCOUNTER
----- Message from Ivanna Dumont sent at 11/30/2020  2:22 PM CST -----  Regarding: pharmacy  Contact: [facility]/[place]/[phone]  Type:  Pharmacy Calling to Clarify an RX    Name of Caller:  [facility]/[place]/[phone]  Pharmacy Name:        [facility] - [place], [place] - [address] [facility]  [address] [address] [address]  Phone: [phone] Fax: [phone]     Prescription Name:  clonazePAM (KLONOPIN) 0.5 MG tablet    What do they need to clarify?:  directions

## 2020-11-30 NOTE — TELEPHONE ENCOUNTER
----- Message from Antonette Hart sent at 11/30/2020 10:02 AM CST -----  Regarding: refill  Contact: patient  Type:  RX Refill Request    Who Called:  Patient   Refill or New Rx:  refill  RX Name and Strength:  albuterol, propranolol, clonazePAM  Is this a 30 day or 90 day RX: 30day  Preferred Pharmacy with phone number:      [facility] [address] [facility]  [address] [address] [address]  Phone: [phone] Fax: [phone]     Local or Mail Order:  Local  Ordering Provider:  Dr. Barron Day Call Back Number:  [phone] (home)     Case number [id number]

## 2020-11-30 NOTE — TELEPHONE ENCOUNTER
Notified patient that I sent a request for refills to Dr Mart.She knows she needs to be seen for refills on the clonazepam and will call back to make an appointment.

## 2020-11-30 NOTE — TELEPHONE ENCOUNTER
Pharmacy wanted to make sure that taking 1/4 tablet was the directions. The rx was transferred from [facility]. I did inform them that she is going to need an appt to get refills.

## 2020-12-15 ENCOUNTER — TELEPHONE (OUTPATIENT)
Dept: FAMILY MEDICINE | Facility: CLINIC | Age: 85
End: 2020-12-15

## 2020-12-15 NOTE — TELEPHONE ENCOUNTER
----- Message from Madyson Herzog sent at 12/15/2020  3:50 PM CST -----  Regarding: advice  Contact: YUDELKA MATSON [[id number]]  Patient is requesting a call back from the nurse in regards to a virtual visit.    Please call the patient upon request at phone number [phone].

## 2020-12-21 ENCOUNTER — TELEPHONE (OUTPATIENT)
Dept: FAMILY MEDICINE | Facility: CLINIC | Age: 85
End: 2020-12-21

## 2020-12-21 NOTE — TELEPHONE ENCOUNTER
----- Message from Princess ANTONIO Goldberg sent at 12/21/2020 10:22 AM CST -----  Contact: Mandie  Type:  RX Refill Request    Who Called:  Auroa   Refill or New Rx:  refill   RX Name and Strength:  clonazePAM (KLONOPIN) 0.5 MG tablet  How is the patient currently taking it? (ex. 1XDay):    Is this a 30 day or 90 day RX:   Preferred Pharmacy with phone number:      [facility] [address] [facility]  [address] [address] [address]  Phone: [phone] Fax: [phone]        Local or Mail Order:  local   Ordering Provider:  Dr. Mart  Best Call Back Number:    Additional Information:  please when rx is sent.

## 2020-12-23 ENCOUNTER — TELEPHONE (OUTPATIENT)
Dept: FAMILY MEDICINE | Facility: CLINIC | Age: 85
End: 2020-12-23

## 2020-12-23 ENCOUNTER — OFFICE VISIT (OUTPATIENT)
Dept: FAMILY MEDICINE | Facility: CLINIC | Age: 85
End: 2020-12-23
Payer: MEDICARE

## 2020-12-23 DIAGNOSIS — R53.81 DEBILITY: Primary | ICD-10-CM

## 2020-12-23 DIAGNOSIS — J44.9 CHRONIC OBSTRUCTIVE PULMONARY DISEASE, UNSPECIFIED COPD TYPE: ICD-10-CM

## 2020-12-23 DIAGNOSIS — G25.2 INTENTION TREMOR: ICD-10-CM

## 2020-12-23 DIAGNOSIS — G47.00 INSOMNIA, UNSPECIFIED TYPE: ICD-10-CM

## 2020-12-23 PROCEDURE — 99499 UNLISTED E&M SERVICE: CPT | Mod: 95,,, | Performed by: INTERNAL MEDICINE

## 2020-12-23 PROCEDURE — 1159F MED LIST DOCD IN RCRD: CPT | Mod: 95,,, | Performed by: INTERNAL MEDICINE

## 2020-12-23 PROCEDURE — 99499 RISK ADDL DX/OHS AUDIT: ICD-10-PCS | Mod: 95,,, | Performed by: INTERNAL MEDICINE

## 2020-12-23 PROCEDURE — 1159F PR MEDICATION LIST DOCUMENTED IN MEDICAL RECORD: ICD-10-PCS | Mod: 95,,, | Performed by: INTERNAL MEDICINE

## 2020-12-23 PROCEDURE — 99213 PR OFFICE/OUTPT VISIT, EST, LEVL III, 20-29 MIN: ICD-10-PCS | Mod: 95,,, | Performed by: INTERNAL MEDICINE

## 2020-12-23 PROCEDURE — 99213 OFFICE O/P EST LOW 20 MIN: CPT | Mod: 95,,, | Performed by: INTERNAL MEDICINE

## 2020-12-23 RX ORDER — CLONAZEPAM 0.5 MG/1
TABLET ORAL
Qty: 8 TABLET | Refills: 2 | Status: SHIPPED | OUTPATIENT
Start: 2020-12-23

## 2020-12-23 RX ORDER — ALBUTEROL SULFATE 90 UG/1
2 AEROSOL, METERED RESPIRATORY (INHALATION) EVERY 6 HOURS PRN
Qty: 18 G | Refills: 5 | Status: SHIPPED | OUTPATIENT
Start: 2020-12-23

## 2020-12-23 NOTE — PROGRESS NOTES
Subjective:      1:36 PM     Patient ID: Jennifer Diaz is a 88 y.o. female.  Video visit:    The patient location is: [place]  The chief complaint leading to consultation is:  Insomnia  Visit type: Virtual visit with synchronous audio and video  Total time spent with patient: 20 mins  Each patient to whom he or she provides medical services by telemedicine is:  (1) informed of the relationship between the physician and patient and the respective role of any other health care provider with respect to management of the patient; and (2) notified that he or she may decline to receive medical services by telemedicine and may withdraw from such care at any time.    Notes:       Chief Complaint: No chief complaint on file.    HPI   The patient has been on very low-dose dose Klonopin to help her rest.  She is on stable dosage that she tolerates.  She has that intention tremor and insomnia both which are better with the Klonopin.  She has been without it for 2 weeks.    Intention tremor is better when she gets propanolol 20 mg.  We tried Mysolin  but she did not like it.    The patient is without her albuterol.  She says she sort of breath.  When advised that I cannot write her for the propanolol if short of breath she became argumentative.  I asked her to come in for a visit.  She is afraid with the COVID.  I will try to arrange some home health to ascertain a bad the problem is I encouraged to go to the ER she is having problems.  Review of Systems      Objective:      There were no vitals filed for this visit.  Physical Exam  No results found for this or any previous visit (from the past 1008 hour(s)).       Assessment:       1. Intention tremor    2. Insomnia, unspecified type    3. Chronic obstructive pulmonary disease, unspecified COPD type          Plan:       Intention tremor  -     clonazePAM (KLONOPIN) 0.5 MG tablet; 0.125 mg po every bedtime  Dispense: 8 tablet; Refill: 2    Insomnia, unspecified type  -      clonazePAM (KLONOPIN) 0.5 MG tablet; 0.125 mg po every bedtime  Dispense: 8 tablet; Refill: 2    Chronic obstructive pulmonary disease, unspecified COPD type  -     albuterol (PROVENTIL/VENTOLIN HFA) 90 mcg/actuation inhaler; Inhale 2 puffs into the lungs every 6 (six) hours as needed for Wheezing.  Dispense: 18 g; Refill: 5  -     Ambulatory referral/consult to Home Health; Future; Expected date: 12/30/2020      No follow-ups on file.

## 2020-12-23 NOTE — PATIENT INSTRUCTIONS
We need to document that your breathing is good before we can refer give your propranolol.  That should never be used if your having trouble breathing     Go to the  ER if your breathing gets worse.    If you have labs scheduled at [facility] you need to make an appointment. This is a measure to protect you from covid 19.      Thank you for choosing [facility].     Please fill out the patient experience survey.

## 2020-12-28 ENCOUNTER — TELEPHONE (OUTPATIENT)
Dept: FAMILY MEDICINE | Facility: CLINIC | Age: 85
End: 2020-12-28

## 2020-12-28 NOTE — TELEPHONE ENCOUNTER
----- Message from Sabina Mcdaniel sent at 12/28/2020  3:00 PM CST -----  Regarding: Advice  Contact: andrea [facility]  Type: Needs Medical Advice    Who Called:  andrea with [facility]   Symptoms (please be specific):  n/a  How long has patient had these symptoms:  n/a  Pharmacy name and phone #:  n/a  Best Call Back Number: [phone]  Additional Information: please call regarding request for home health, need additional information. Please call to advise

## 2020-12-29 NOTE — TELEPHONE ENCOUNTER
Spoke with Adelita with [employer] and she stated that the diagnosis does not meet the criteria for home health and they are requesting clarification on why this patient would need home health.   Please advise.     Adelita-Clout [facility]: [phone]

## 2021-01-27 DIAGNOSIS — I50.9 HEART FAILURE, UNSPECIFIED HF CHRONICITY, UNSPECIFIED HEART FAILURE TYPE: ICD-10-CM

## 2021-01-27 RX ORDER — ALBUTEROL SULFATE 90 UG/1
2 AEROSOL, METERED RESPIRATORY (INHALATION) EVERY 6 HOURS PRN
Qty: 18 G | Refills: 5 | Status: SHIPPED | OUTPATIENT
Start: 2021-01-27

## 2021-03-15 ENCOUNTER — TELEPHONE (OUTPATIENT)
Dept: FAMILY MEDICINE | Facility: CLINIC | Age: 86
End: 2021-03-15

## 2021-04-06 ENCOUNTER — TELEPHONE (OUTPATIENT)
Dept: FAMILY MEDICINE | Facility: CLINIC | Age: 86
End: 2021-04-06

## 2021-04-07 ENCOUNTER — TELEPHONE (OUTPATIENT)
Dept: FAMILY MEDICINE | Facility: CLINIC | Age: 86
End: 2021-04-07

## 2022-08-18 NOTE — PROGRESS NOTES
Health Maintenance Due   Topic Date Due    Lipid Panel  09/07/1932    TETANUS VACCINE  09[birth year]07/1950    Zoster Vaccine  09/07/1992    Pneumococcal (65+) (2 of 2 - PPSV23) 03/15/2017    Influenza Vaccine  08/01/2018       
none
